# Patient Record
Sex: FEMALE | Race: BLACK OR AFRICAN AMERICAN | NOT HISPANIC OR LATINO | Employment: UNEMPLOYED | URBAN - METROPOLITAN AREA
[De-identification: names, ages, dates, MRNs, and addresses within clinical notes are randomized per-mention and may not be internally consistent; named-entity substitution may affect disease eponyms.]

---

## 2019-10-10 ENCOUNTER — OFFICE VISIT (OUTPATIENT)
Dept: FAMILY MEDICINE | Facility: CLINIC | Age: 59
End: 2019-10-10
Payer: COMMERCIAL

## 2019-10-10 ENCOUNTER — LAB VISIT (OUTPATIENT)
Dept: LAB | Facility: HOSPITAL | Age: 59
End: 2019-10-10
Attending: NURSE PRACTITIONER
Payer: COMMERCIAL

## 2019-10-10 ENCOUNTER — TELEPHONE (OUTPATIENT)
Dept: NEUROLOGY | Facility: CLINIC | Age: 59
End: 2019-10-10

## 2019-10-10 ENCOUNTER — TELEPHONE (OUTPATIENT)
Dept: FAMILY MEDICINE | Facility: CLINIC | Age: 59
End: 2019-10-10

## 2019-10-10 VITALS
DIASTOLIC BLOOD PRESSURE: 72 MMHG | BODY MASS INDEX: 29.84 KG/M2 | OXYGEN SATURATION: 98 % | HEART RATE: 86 BPM | SYSTOLIC BLOOD PRESSURE: 126 MMHG | HEIGHT: 64 IN | TEMPERATURE: 98 F | WEIGHT: 174.81 LBS

## 2019-10-10 DIAGNOSIS — E66.9 OBESITY (BMI 30.0-34.9): ICD-10-CM

## 2019-10-10 DIAGNOSIS — R29.6 FALLS FREQUENTLY: ICD-10-CM

## 2019-10-10 DIAGNOSIS — M62.81 MUSCLE WEAKNESS (GENERALIZED): Primary | ICD-10-CM

## 2019-10-10 DIAGNOSIS — M62.81 MUSCLE WEAKNESS (GENERALIZED): ICD-10-CM

## 2019-10-10 DIAGNOSIS — E11.65 CONTROLLED TYPE 2 DIABETES MELLITUS WITH HYPERGLYCEMIA, WITHOUT LONG-TERM CURRENT USE OF INSULIN: ICD-10-CM

## 2019-10-10 DIAGNOSIS — R10.9 ABDOMINAL DISCOMFORT: ICD-10-CM

## 2019-10-10 LAB
ALBUMIN SERPL BCP-MCNC: 3.8 G/DL (ref 3.5–5.2)
ALP SERPL-CCNC: 81 U/L (ref 55–135)
ALT SERPL W/O P-5'-P-CCNC: 13 U/L (ref 10–44)
ALT SERPL W/O P-5'-P-CCNC: 13 U/L (ref 10–44)
ANION GAP SERPL CALC-SCNC: 9 MMOL/L (ref 8–16)
AST SERPL-CCNC: 14 U/L (ref 10–40)
AST SERPL-CCNC: 14 U/L (ref 10–40)
BASOPHILS # BLD AUTO: 0.07 K/UL (ref 0–0.2)
BASOPHILS NFR BLD: 0.5 % (ref 0–1.9)
BILIRUB SERPL-MCNC: 0.2 MG/DL (ref 0.1–1)
BUN SERPL-MCNC: 18 MG/DL (ref 6–20)
CALCIUM SERPL-MCNC: 9.8 MG/DL (ref 8.7–10.5)
CHLORIDE SERPL-SCNC: 101 MMOL/L (ref 95–110)
CK SERPL-CCNC: 63 U/L (ref 20–180)
CO2 SERPL-SCNC: 28 MMOL/L (ref 23–29)
CREAT SERPL-MCNC: 1 MG/DL (ref 0.5–1.4)
CRP SERPL-MCNC: 10.8 MG/L (ref 0–8.2)
DIFFERENTIAL METHOD: ABNORMAL
EOSINOPHIL # BLD AUTO: 0.4 K/UL (ref 0–0.5)
EOSINOPHIL NFR BLD: 2.8 % (ref 0–8)
ERYTHROCYTE [DISTWIDTH] IN BLOOD BY AUTOMATED COUNT: 12.8 % (ref 11.5–14.5)
ERYTHROCYTE [SEDIMENTATION RATE] IN BLOOD BY WESTERGREN METHOD: 47 MM/HR (ref 0–20)
EST. GFR  (AFRICAN AMERICAN): >60 ML/MIN/1.73 M^2
EST. GFR  (NON AFRICAN AMERICAN): >60 ML/MIN/1.73 M^2
GLUCOSE SERPL-MCNC: 98 MG/DL (ref 70–110)
HCT VFR BLD AUTO: 34 % (ref 37–48.5)
HGB BLD-MCNC: 10.1 G/DL (ref 12–16)
IMM GRANULOCYTES # BLD AUTO: 0.05 K/UL (ref 0–0.04)
IMM GRANULOCYTES NFR BLD AUTO: 0.3 % (ref 0–0.5)
LYMPHOCYTES # BLD AUTO: 3.6 K/UL (ref 1–4.8)
LYMPHOCYTES NFR BLD: 24.4 % (ref 18–48)
MCH RBC QN AUTO: 27.6 PG (ref 27–31)
MCHC RBC AUTO-ENTMCNC: 29.7 G/DL (ref 32–36)
MCV RBC AUTO: 93 FL (ref 82–98)
MONOCYTES # BLD AUTO: 0.9 K/UL (ref 0.3–1)
MONOCYTES NFR BLD: 6.1 % (ref 4–15)
NEUTROPHILS # BLD AUTO: 9.8 K/UL (ref 1.8–7.7)
NEUTROPHILS NFR BLD: 65.9 % (ref 38–73)
NRBC BLD-RTO: 0 /100 WBC
PLATELET # BLD AUTO: 507 K/UL (ref 150–350)
PMV BLD AUTO: 10.7 FL (ref 9.2–12.9)
POTASSIUM SERPL-SCNC: 3.9 MMOL/L (ref 3.5–5.1)
PROT SERPL-MCNC: 7.5 G/DL (ref 6–8.4)
RBC # BLD AUTO: 3.66 M/UL (ref 4–5.4)
SODIUM SERPL-SCNC: 138 MMOL/L (ref 136–145)
WBC # BLD AUTO: 14.84 K/UL (ref 3.9–12.7)

## 2019-10-10 PROCEDURE — 3008F BODY MASS INDEX DOCD: CPT | Mod: CPTII,S$GLB,, | Performed by: NURSE PRACTITIONER

## 2019-10-10 PROCEDURE — 83615 LACTATE (LD) (LDH) ENZYME: CPT

## 2019-10-10 PROCEDURE — 85025 COMPLETE CBC W/AUTO DIFF WBC: CPT

## 2019-10-10 PROCEDURE — 99204 OFFICE O/P NEW MOD 45 MIN: CPT | Mod: S$GLB,,, | Performed by: NURSE PRACTITIONER

## 2019-10-10 PROCEDURE — 86140 C-REACTIVE PROTEIN: CPT

## 2019-10-10 PROCEDURE — 36415 COLL VENOUS BLD VENIPUNCTURE: CPT | Mod: PO

## 2019-10-10 PROCEDURE — 99999 PR PBB SHADOW E&M-NEW PATIENT-LVL III: CPT | Mod: PBBFAC,,, | Performed by: NURSE PRACTITIONER

## 2019-10-10 PROCEDURE — 82550 ASSAY OF CK (CPK): CPT

## 2019-10-10 PROCEDURE — 99999 PR PBB SHADOW E&M-NEW PATIENT-LVL III: ICD-10-PCS | Mod: PBBFAC,,, | Performed by: NURSE PRACTITIONER

## 2019-10-10 PROCEDURE — 3008F PR BODY MASS INDEX (BMI) DOCUMENTED: ICD-10-PCS | Mod: CPTII,S$GLB,, | Performed by: NURSE PRACTITIONER

## 2019-10-10 PROCEDURE — 82085 ASSAY OF ALDOLASE: CPT

## 2019-10-10 PROCEDURE — 80053 COMPREHEN METABOLIC PANEL: CPT

## 2019-10-10 PROCEDURE — 85651 RBC SED RATE NONAUTOMATED: CPT | Mod: PO

## 2019-10-10 PROCEDURE — 99204 PR OFFICE/OUTPT VISIT, NEW, LEVL IV, 45-59 MIN: ICD-10-PCS | Mod: S$GLB,,, | Performed by: NURSE PRACTITIONER

## 2019-10-10 RX ORDER — GLIMEPIRIDE 4 MG/1
4 TABLET ORAL
COMMUNITY
Start: 2019-09-09 | End: 2020-01-14 | Stop reason: SDUPTHER

## 2019-10-10 RX ORDER — ATORVASTATIN CALCIUM 10 MG/1
10 TABLET, FILM COATED ORAL DAILY
COMMUNITY
End: 2021-10-12 | Stop reason: SDUPTHER

## 2019-10-10 RX ORDER — METFORMIN HYDROCHLORIDE 1000 MG/1
1000 TABLET ORAL 2 TIMES DAILY WITH MEALS
COMMUNITY
Start: 2019-09-09 | End: 2020-03-20 | Stop reason: SDUPTHER

## 2019-10-10 RX ORDER — LOSARTAN POTASSIUM 50 MG/1
50 TABLET ORAL DAILY
COMMUNITY
Start: 2019-09-10 | End: 2019-11-07

## 2019-10-10 RX ORDER — LEVOTHYROXINE SODIUM 50 UG/1
50 TABLET ORAL DAILY
COMMUNITY
End: 2020-01-14 | Stop reason: SDUPTHER

## 2019-10-10 RX ORDER — AMLODIPINE BESYLATE 5 MG/1
10 TABLET ORAL
COMMUNITY
Start: 2019-09-25 | End: 2020-01-14 | Stop reason: ALTCHOICE

## 2019-10-10 NOTE — PROGRESS NOTES
Subjective:       Patient ID: Lala Saxena is a 59 y.o. female.    Chief Complaint: gait difficulty (leg weakness)    Patient presents today with family for concerns of progressive weakness over 3 months. Patient reports muscle weakness started with lower extremity and progressed to upper extremities. Patient recently started on Lipitor and metformin in august. Patient family reports patient has fallen 3-4 times in one month.  reports having to help patient with ADLs .Patient is now using a walker to ambulate. Denies respiratory distress     Fatigue   This is a new problem. The current episode started more than 1 month ago. The problem occurs constantly. Associated symptoms include abdominal pain, fatigue and weakness. Pertinent negatives include no chest pain, congestion, coughing, fever, headaches, nausea, numbness, rash, sore throat or swollen glands. The symptoms are aggravated by exertion. She has tried nothing for the symptoms.       Past Medical History:   Diagnosis Date    Diabetes mellitus, type 2     Hyperlipidemia     Hypertension     Thyroid disease        Review of patient's allergies indicates:  No Known Allergies      Current Outpatient Medications:     amLODIPine (NORVASC) 5 MG tablet, Take 5 mg by mouth., Disp: , Rfl:     atorvastatin (LIPITOR) 10 MG tablet, Take 10 mg by mouth once daily., Disp: , Rfl:     glimepiride (AMARYL) 4 MG tablet, Take 4 mg by mouth daily with breakfast. , Disp: , Rfl:     levothyroxine (SYNTHROID) 50 MCG tablet, Take 50 mcg by mouth once daily., Disp: , Rfl:     losartan (COZAAR) 50 MG tablet, Take 50 mg by mouth once daily. , Disp: , Rfl:     metFORMIN (GLUCOPHAGE) 1000 MG tablet, Take 1,000 mg by mouth 2 (two) times daily with meals. , Disp: , Rfl:     Review of Systems   Constitutional: Positive for fatigue. Negative for fever and unexpected weight change.   HENT: Negative for congestion, sore throat and trouble swallowing.    Eyes: Negative for  "visual disturbance.   Respiratory: Negative for cough and shortness of breath.    Cardiovascular: Negative for chest pain, palpitations and leg swelling.   Gastrointestinal: Positive for abdominal pain. Negative for blood in stool and nausea.   Genitourinary: Negative for hematuria.   Skin: Negative for rash.   Allergic/Immunologic: Negative for immunocompromised state.   Neurological: Positive for weakness. Negative for numbness and headaches.   Hematological: Does not bruise/bleed easily.   Psychiatric/Behavioral: Negative for agitation. The patient is not nervous/anxious.        Objective:      /72 (BP Location: Right arm, Patient Position: Sitting, BP Method: Large (Manual))   Pulse 86   Temp 98 °F (36.7 °C) (Oral)   Ht 5' 4" (1.626 m)   Wt 79.3 kg (174 lb 13.2 oz)   SpO2 98%   BMI 30.01 kg/m²   Physical Exam   Constitutional: She is oriented to person, place, and time. She appears well-developed and well-nourished.   Eyes: Pupils are equal, round, and reactive to light. EOM are normal.   Neck: Normal range of motion.   Cardiovascular: Normal rate, regular rhythm and normal heart sounds.   Pulmonary/Chest: Effort normal and breath sounds normal.   Abdominal: Soft. Bowel sounds are normal.   Musculoskeletal: Normal range of motion.   No noticeable contraction, decrease bilateral hand , muscle weakness in the lower and upper extremity.  muscle weakness more pronounce in lower extremities.   Neurological: She is alert and oriented to person, place, and time. She displays a negative Romberg sign. Coordination and gait abnormal.   Skin: Skin is warm and dry.   Psychiatric: She has a normal mood and affect. Her behavior is normal. Judgment and thought content normal.       Assessment:       1. Muscle weakness (generalized)    2. Falls frequently    3. Controlled type 2 diabetes mellitus with hyperglycemia, without long-term current use of insulin    4. Obesity (BMI 30.0-34.9)        Plan:       Muscle " weakness (generalized)  -     CK; Future; Expected date: 10/10/2019  -     Comprehensive metabolic panel; Future; Expected date: 10/10/2019  -     CBC auto differential; Future; Expected date: 10/10/2019  -     Sedimentation rate; Future; Expected date: 10/10/2019  -     C-reactive protein; Future; Expected date: 10/10/2019  -     Ambulatory referral to Neurology  -     Aldolase; Future; Expected date: 10/10/2019  -     Lactate dehydrogenase; Future; Expected date: 10/10/2019  -     ALT (SGPT); Future; Expected date: 10/10/2019  -     AST (SGOT); Future; Expected date: 10/10/2019  -     Myoglobin, urine; Future; Expected date: 10/10/2019  Patient reports muscle weakness started when she started Lipitor  Hold Lipitor until lab results are reviewed  Falls frequently  -     CK; Future; Expected date: 10/10/2019  -     Comprehensive metabolic panel; Future; Expected date: 10/10/2019  -     CBC auto differential; Future; Expected date: 10/10/2019  -     Sedimentation rate; Future; Expected date: 10/10/2019  -     C-reactive protein; Future; Expected date: 10/10/2019  -     Ambulatory referral to Neurology  -     Aldolase; Future; Expected date: 10/10/2019  -     Lactate dehydrogenase; Future; Expected date: 10/10/2019  -     ALT (SGPT); Future; Expected date: 10/10/2019  -     AST (SGOT); Future; Expected date: 10/10/2019  -     Myoglobin, urine; Future; Expected date: 10/10/2019  Abdominal discomfort  Patient reports abdominal discomfort since Morenita=tformin was increased  Hold metformin for three days, I will call to follow up with patient  Controlled type 2 diabetes mellitus with hyperglycemia, without long-term current use of insulin  -     Hemoglobin A1c; Future; Expected date: 10/10/2019  Obesity (BMI 30.0-34.9)  Counseled patient on his ideal body weight, health consequences of being obese and current recommendations including weekly exercise and a heart healthy diet.  Current BMI is:Estimated body mass index is  "30.01 kg/m² as calculated from the following:    Height as of this encounter: 5' 4" (1.626 m).    Weight as of this encounter: 79.3 kg (174 lb 13.2 oz)..  Patient is aware that ideal BMI < 25 or Weight in (lb) to have BMI = 25: 145.3.            Patient readiness: acceptance and barriers:none    During the course of the visit the patient was educated and counseled about the following:     Diabetes:  Discussed general issues about diabetes pathophysiology and management.  Suggested low cholesterol diet.  Hypertension:   Dietary sodium restriction.  Obesity:   General weight loss/lifestyle modification strategies discussed (elicit support from others; identify saboteurs; non-food rewards, etc).    Goals: Diabetes: Maintain Hemoglobin A1C below 7, Hypertension: Reduce Blood Pressure and Obesity: Reduce calorie intake and BMI    Did patient meet goals/outcomes: No    The following self management tools provided: declined    Patient Instructions (the written plan) was given to the patient/family.     Time spent with patient: 30 minutes    Barriers to medications present (no )    Adverse reactions to current medications (no)    Over the counter medications reviewed (Yes)        "

## 2019-10-10 NOTE — TELEPHONE ENCOUNTER
Please contact pt for an appt  Muscle weakness lower ext  Pt is aware that you will be calling   appt is needed ASAP

## 2019-10-11 LAB — LDH SERPL L TO P-CCNC: 179 U/L (ref 110–260)

## 2019-10-14 DIAGNOSIS — R82.998 URINE WBC INCREASED: Primary | ICD-10-CM

## 2019-10-14 DIAGNOSIS — R10.84 GENERALIZED ABDOMINAL PAIN: ICD-10-CM

## 2019-10-14 DIAGNOSIS — D75.839 THROMBOCYTOSIS: ICD-10-CM

## 2019-10-14 LAB — ALDOLASE SERPL-CCNC: 2.2 U/L (ref 1.2–7.6)

## 2019-10-15 ENCOUNTER — TELEPHONE (OUTPATIENT)
Dept: FAMILY MEDICINE | Facility: CLINIC | Age: 59
End: 2019-10-15

## 2019-10-15 NOTE — TELEPHONE ENCOUNTER
----- Message from Soniarajinder Moshe sent at 10/15/2019  2:43 PM CDT -----  Contact: patient   Type:  Patient Returning Call    Who Called: patient   Who Left Message for Patient:  n/a  Does the patient know what this is regarding?:  Results   Best Call Back Number:  [phone]

## 2019-10-18 ENCOUNTER — HOSPITAL ENCOUNTER (OUTPATIENT)
Dept: RADIOLOGY | Facility: HOSPITAL | Age: 59
Discharge: HOME | End: 2019-10-18
Attending: NURSE PRACTITIONER
Payer: COMMERCIAL

## 2019-10-18 DIAGNOSIS — R10.84 GENERALIZED ABDOMINAL PAIN: ICD-10-CM

## 2019-10-18 PROCEDURE — 74178 CT ABD&PLV WO CNTR FLWD CNTR: CPT | Mod: TC

## 2019-10-18 PROCEDURE — 25500020 PHARM REV CODE 255: Performed by: NURSE PRACTITIONER

## 2019-10-18 RX ADMIN — IOHEXOL 100 ML: 350 INJECTION, SOLUTION INTRAVENOUS at 10:10

## 2019-10-23 ENCOUNTER — TELEPHONE (OUTPATIENT)
Dept: FAMILY MEDICINE | Facility: CLINIC | Age: 59
End: 2019-10-23

## 2019-10-23 NOTE — TELEPHONE ENCOUNTER
Patient requesting copy of recent lab results. Requested result be mailed to her home address. Lab results mailed as requested. Patient also requesting assistance scheduling Neurology referral. Message forwarded to Dr. Oro's staff to assist with scheduling. Patient notified.

## 2019-10-24 ENCOUNTER — TELEPHONE (OUTPATIENT)
Dept: FAMILY MEDICINE | Facility: CLINIC | Age: 59
End: 2019-10-24

## 2019-10-24 NOTE — TELEPHONE ENCOUNTER
Neurology referral placed by Mrs. Allen Crocker NP related to muscle weakness (generalized) and frequent falls. Requesting assistance scheduling appointment for patient. Patient can be reached at [phone]. Thank you.

## 2019-11-04 ENCOUNTER — TELEPHONE (OUTPATIENT)
Dept: FAMILY MEDICINE | Facility: CLINIC | Age: 59
End: 2019-11-04

## 2019-11-04 NOTE — TELEPHONE ENCOUNTER
----- Message from Yelena Garner sent at 11/4/2019  8:39 AM CST -----  Contact: Patient  Type: Needs Medical Advice    Who Called:  patient  Symptoms (please be specific):  na  How long has patient had these symptoms:  matilda  Pharmacy name and phone #:  matilda  Best Call Back Number: [phone]  Additional Information: Patient states Obi's office was suppose to call her back and set up apt. With neurologist but no one has returned her call.  Please call to advise and schedule. Thanks!

## 2019-11-05 ENCOUNTER — OFFICE VISIT (OUTPATIENT)
Dept: NEUROLOGY | Facility: CLINIC | Age: 59
End: 2019-11-05
Payer: COMMERCIAL

## 2019-11-05 ENCOUNTER — HOSPITAL ENCOUNTER (EMERGENCY)
Facility: HOSPITAL | Age: 59
Discharge: HOME | End: 2019-11-05
Attending: EMERGENCY MEDICINE
Payer: COMMERCIAL

## 2019-11-05 VITALS
BODY MASS INDEX: 29.71 KG/M2 | HEART RATE: 83 BPM | HEIGHT: 64 IN | OXYGEN SATURATION: 100 % | WEIGHT: 174 LBS | DIASTOLIC BLOOD PRESSURE: 68 MMHG | SYSTOLIC BLOOD PRESSURE: 143 MMHG | RESPIRATION RATE: 18 BRPM | TEMPERATURE: 98 F

## 2019-11-05 VITALS
BODY MASS INDEX: 30.01 KG/M2 | HEART RATE: 86 BPM | HEIGHT: 64 IN | SYSTOLIC BLOOD PRESSURE: 189 MMHG | DIASTOLIC BLOOD PRESSURE: 85 MMHG

## 2019-11-05 DIAGNOSIS — R27.0 ATAXIA: Primary | ICD-10-CM

## 2019-11-05 DIAGNOSIS — I10 HYPERTENSION, UNSPECIFIED TYPE: Primary | ICD-10-CM

## 2019-11-05 DIAGNOSIS — Z87.39 HISTORY OF WEAKNESS OF EXTREMITY: ICD-10-CM

## 2019-11-05 DIAGNOSIS — R53.1 WEAKNESS: ICD-10-CM

## 2019-11-05 DIAGNOSIS — D72.829 LEUKOCYTOSIS, UNSPECIFIED TYPE: ICD-10-CM

## 2019-11-05 DIAGNOSIS — R29.898 LEFT HAND WEAKNESS: ICD-10-CM

## 2019-11-05 PROCEDURE — 99999 PR PBB SHADOW E&M-EST. PATIENT-LVL IV: ICD-10-PCS | Mod: PBBFAC,,, | Performed by: PSYCHIATRY & NEUROLOGY

## 2019-11-05 PROCEDURE — 3008F PR BODY MASS INDEX (BMI) DOCUMENTED: ICD-10-PCS | Mod: CPTII,S$GLB,, | Performed by: PSYCHIATRY & NEUROLOGY

## 2019-11-05 PROCEDURE — 3008F BODY MASS INDEX DOCD: CPT | Mod: CPTII,S$GLB,, | Performed by: PSYCHIATRY & NEUROLOGY

## 2019-11-05 PROCEDURE — 99205 PR OFFICE/OUTPT VISIT, NEW, LEVL V, 60-74 MIN: ICD-10-PCS | Mod: S$GLB,,, | Performed by: PSYCHIATRY & NEUROLOGY

## 2019-11-05 PROCEDURE — 99999 PR PBB SHADOW E&M-EST. PATIENT-LVL IV: CPT | Mod: PBBFAC,,, | Performed by: PSYCHIATRY & NEUROLOGY

## 2019-11-05 PROCEDURE — 99205 OFFICE O/P NEW HI 60 MIN: CPT | Mod: S$GLB,,, | Performed by: PSYCHIATRY & NEUROLOGY

## 2019-11-05 PROCEDURE — 99282 PR EMERGENCY DEPT VISIT,LEVEL II: ICD-10-PCS | Mod: ,,, | Performed by: PHYSICIAN ASSISTANT

## 2019-11-05 PROCEDURE — 99281 EMR DPT VST MAYX REQ PHY/QHP: CPT

## 2019-11-05 PROCEDURE — 99282 EMERGENCY DEPT VISIT SF MDM: CPT | Mod: ,,, | Performed by: PHYSICIAN ASSISTANT

## 2019-11-05 RX ORDER — LOSARTAN POTASSIUM 25 MG/1
25 TABLET ORAL ONCE
Status: DISCONTINUED | OUTPATIENT
Start: 2019-11-05 | End: 2019-11-05 | Stop reason: HOSPADM

## 2019-11-05 RX ORDER — LOSARTAN POTASSIUM 25 MG/1
TABLET ORAL
COMMUNITY
Start: 2019-10-15 | End: 2019-11-07

## 2019-11-05 NOTE — ASSESSMENT & PLAN NOTE
60yo with subacute staggering gait. She has been recently dxed with DM2. Exam shows no cerebellar findings, however she has a stiff, staggering gait. Increased reflexes on L side, POS hoffmans and clawed appearence of L hand - concern for cspine dz. Brain MRI due to additional rigidity proximally. Paraneoplastic panel, with minnie 65 given axial and appedicular stiffness. PT.

## 2019-11-05 NOTE — LETTER
November 5, 2019      Allen Crocker, SHAHANA  [address]  [facility] [address]           [facility]  [address]  [facility] [address]  Phone: [phone]  Fax: [phone]          Patient: Lala Saxena   MR Number: [id number]   YOB: 1960   Date of Visit: 11/5/2019       Dear Allen Crocker:    Thank you for referring Lala Saxena to me for evaluation. Attached you will find relevant portions of my assessment and plan of care.    If you have questions, please do not hesitate to call me. I look forward to following Lala Saxena along with you.    Sincerely,    Katheryn Rao MD    Enclosure  CC:  No Recipients    If you would like to receive this communication electronically, please contact [email or web address] or [phone] to request more information on [employer] access.    For providers and/or their staff who would like to refer a patient to [facility], please contact us through our one-stop-shop provider referral line, [facility], at [phone].    If you feel you have received this communication in error or would no longer like to receive these types of communications, please e-mail [email or web address]          No. MYRANDA screening performed.  STOP BANG Legend: 0-2 = LOW Risk; 3-4 = INTERMEDIATE Risk; 5-8 = HIGH Risk
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
## 2019-11-06 NOTE — ED NOTES
Discharge home with family, states understanding to follow up as directed. Ambulates out of ED without difficulty. States understanding of future appointments, deneis concerns upon discharge.

## 2019-11-06 NOTE — DISCHARGE INSTRUCTIONS
Future Appointments   Date Time Provider Department Center   11/7/2019  9:00 AM Edward Hernandez MD [facility] IM [address] [facility]   11/8/2019  4:45 PM [facility]I-MRI2 [facility]   11/8/2019  5:15 PM [facility]   12/23/2019  7:20 AM Janette Church MD [facility]       Our goal in the emergency department is to always give you outstanding care and exceptional service. You may receive a survey by mail or e-mail in the next week regarding your experience in our ED. We would greatly appreciate your completing and returning the survey. Your feedback provides us with a way to recognize our staff who give very good care and it helps us learn how to improve when your experience was below our aspiration of excellence.

## 2019-11-06 NOTE — ED NOTES
"Patient escorted from bathroom to RWR, states she does not want a CT scan, she has "had them before"  States she needs some time alone and would like to go outside with her spouse. Family member taking patient in front of hospital. Update to PA.   "

## 2019-11-06 NOTE — ED TRIAGE NOTES
"Patient states her blood pressure was high at Neurology office, 180s. States he tool a "piece" of her blood pressure med, only on Coozar 50 mg,  this at 1200. Denies any symptoms,deies CP, SOB, blurred vision, headache,  "

## 2019-11-06 NOTE — ED NOTES
Patient identifiers verified and correct for Ms Saxena  C/C: Elevated blood pressure SEE NN  APPEARANCE: awake and alert in NAD.  SKIN: warm, dry and intact. No breakdown or bruising.  MUSCULOSKELETAL: Patient moving all extremities spontaneously, no obvious swelling or deformities noted. Ambulates with cane  RESPIRATORY: Denies shortness of breath.Respirations unlabored.   CARDIAC: Denies CP, 2+ distal pulses; no peripheral edema  ABDOMEN: S/ND/NT, Denies nausea  : voids spontaneously, denies difficulty  Neurologic: AAO x 4; follows commands equal strength in all extremities; denies numbness/tingling. Denies dizziness denies weakness, denies headache

## 2019-11-06 NOTE — ED PROVIDER NOTES
Encounter Date: 11/5/2019       History     Chief Complaint   Patient presents with    Hypertension     sent from neurology clinic bp high , feel fine     7:23 PM  Patient is a 59-year-old female who presents the ED after being referred by Neurology for elevated blood pressure.  States that she only took half a tablet of her losartan today because she states that she does not feel right after taking a whole tablet.  She has a hard time telling me how long she has been having symptoms after taking an entire dose of losartan.  Patient saw Neurology today for weakness for approximately 1.5 month.  She states that she has noted it mainly in her left upper extremities but does have difficulty walking and uses a cane now. Her symptoms are not worse than usual. Denies any headache, dizziness, lightheadedness, slurred speech, chest pain, coughing, shortness of breath, abdominal pain, nausea, vomiting, urinary symptoms.  denies that patient has any slurred speech or feels as if she has a hard time finding words.        Review of patient's allergies indicates:  No Known Allergies  Past Medical History:   Diagnosis Date    Diabetes mellitus, type 2     Hyperlipidemia     Hypertension     Thyroid disease      History reviewed. No pertinent surgical history.  History reviewed. No pertinent family history.  Social History     Tobacco Use    Smoking status: Never Smoker    Smokeless tobacco: Never Used   Substance Use Topics    Alcohol use: Not Currently    Drug use: Not on file     Review of Systems   Constitutional: Negative for chills and fever.   HENT: Negative for sore throat.    Respiratory: Negative for shortness of breath.    Cardiovascular: Negative for chest pain.   Gastrointestinal: Negative for nausea.   Genitourinary: Negative for dysuria.   Musculoskeletal: Positive for gait problem. Negative for back pain.   Skin: Negative for rash.   Neurological: Positive for weakness. Negative for headaches.    Hematological: Does not bruise/bleed easily.       Physical Exam     Initial Vitals [11/05/19 1636]   BP Pulse Resp Temp SpO2   (!) 143/68 83 18 98.2 °F (36.8 °C) 100 %      MAP       --         Physical Exam    Vitals reviewed.  Constitutional: She appears well-developed and well-nourished. She is not diaphoretic. No distress.   HENT:   Head: Normocephalic and atraumatic.   Nose: Nose normal.   Eyes: Conjunctivae and EOM are normal.   Neck: Normal range of motion.   Pulmonary/Chest: No respiratory distress.   Abdominal: Soft. She exhibits no distension. There is no tenderness. There is no rebound.   Musculoskeletal: Normal range of motion.   Neurological: She is alert and oriented to person, place, and time. She has normal strength. No sensory deficit.   No facial asymmetry.  Weakness noted in left upper extremity.  She has a deformity noted of her left hand.  Difficulty with finger-to-nose mainly on left due to weakness.  Lower extremities within normal limits.     Skin: Skin is warm and dry. No erythema. No pallor.   Psychiatric: She has a normal mood and affect. Thought content normal.         ED Course   Procedures  Labs Reviewed - No data to display       Imaging Results    None          Medical Decision Making:   History:   Old Medical Records: I decided to obtain old medical records.  Old Records Summarized: records from clinic visits and records from previous admission(s).  Initial Assessment:   Patient is a 59-year-old female who presents the ED after being referred by Neurology for elevated blood pressure. Saw neurology today for weakness for 1.5 months.  Differential Diagnosis:   Includes but is not limited to white coat hypertension, hypertensive urgency, hypertensive emergency such as stroke, brain lesion, neuromuscular disorder.  Clinical Tests:   Radiological Study: Ordered  ED Management:  BP from 200/98 to 143/68 mmHg after resting in ED waiting room.    Repeat BP to 215/89 mmHg during my  interview, most likely white coat hypertension. She only took half her losartan today.     Appreciable weakness in left upper extremity during my exam.  However, her symptoms have been going on for over a month without worsening or symptoms neuro symptoms. Given exam and history of elevated BP, will obtain CT head to rule out any acute processes and lesions, however Neurology today has ordered outpatient labs and MRIs for further investigation.    Patient updated with plan. She declines CT and have of her losartan dose.  States that she has had 3 CTs in the past month which were all unremarkable.  Although we wanted to order CT head in the ED, it is unlikely that she has had an acute stroke given her symptoms for over 1 month.  I find it appropriate to continue outpatient workup.  Patient has her MRI scheduled in the next 3 days.  She was advised to return to her normal dose of HTN medication.  Patient comfortable with plan and stable for discharge.    I have reviewed patient's chart and discussed this case with my supervising MD.     Anusha Llanos PA-C  Emergent Department  [facility]  Spectralink #[id number] or #[phone]      Future Appointments   Date Time Provider Department Center   11/7/2019  9:00 AM Edward Hernandez MD [facility] [address] [facility]   11/8/2019  4:45 PM [facility]   11/8/2019  5:15 PM [facility]   12/23/2019  7:20 AM Janette Church MD [facility]                                    Clinical Impression:       ICD-10-CM ICD-9-CM   1. Hypertension, unspecified type I10 401.9   2. History of weakness of extremity Z87.39 V13.59         Disposition:   Disposition: Discharged  Condition: Stable                     Anusha Llanos PA-C  11/05/19 1952

## 2019-11-07 ENCOUNTER — OFFICE VISIT (OUTPATIENT)
Dept: INTERNAL MEDICINE | Facility: CLINIC | Age: 59
End: 2019-11-07
Payer: COMMERCIAL

## 2019-11-07 VITALS
WEIGHT: 172.81 LBS | SYSTOLIC BLOOD PRESSURE: 140 MMHG | DIASTOLIC BLOOD PRESSURE: 86 MMHG | BODY MASS INDEX: 29.5 KG/M2 | HEART RATE: 85 BPM | HEIGHT: 64 IN

## 2019-11-07 DIAGNOSIS — I10 ESSENTIAL HYPERTENSION: ICD-10-CM

## 2019-11-07 DIAGNOSIS — R29.898 LEFT HAND WEAKNESS: ICD-10-CM

## 2019-11-07 DIAGNOSIS — Z76.89 ENCOUNTER TO ESTABLISH CARE WITH NEW DOCTOR: Primary | ICD-10-CM

## 2019-11-07 DIAGNOSIS — R27.0 ATAXIA: ICD-10-CM

## 2019-11-07 DIAGNOSIS — R35.0 FREQUENCY OF URINATION: ICD-10-CM

## 2019-11-07 DIAGNOSIS — E11.40 TYPE 2 DIABETES MELLITUS WITH DIABETIC NEUROPATHY, WITHOUT LONG-TERM CURRENT USE OF INSULIN: ICD-10-CM

## 2019-11-07 PROBLEM — E11.9 TYPE 2 DIABETES MELLITUS, WITHOUT LONG-TERM CURRENT USE OF INSULIN: Status: ACTIVE | Noted: 2019-11-07

## 2019-11-07 PROCEDURE — 99396 PREV VISIT EST AGE 40-64: CPT | Mod: S$GLB,,, | Performed by: FAMILY MEDICINE

## 2019-11-07 PROCEDURE — 99999 PR PBB SHADOW E&M-EST. PATIENT-LVL III: ICD-10-PCS | Mod: PBBFAC,,, | Performed by: FAMILY MEDICINE

## 2019-11-07 PROCEDURE — 99396 PR PREVENTIVE VISIT,EST,40-64: ICD-10-PCS | Mod: S$GLB,,, | Performed by: FAMILY MEDICINE

## 2019-11-07 PROCEDURE — 99999 PR PBB SHADOW E&M-EST. PATIENT-LVL III: CPT | Mod: PBBFAC,,, | Performed by: FAMILY MEDICINE

## 2019-11-07 RX ORDER — LOSARTAN POTASSIUM 25 MG/1
30 TABLET ORAL DAILY
Status: ON HOLD | COMMUNITY
End: 2019-12-03 | Stop reason: HOSPADM

## 2019-11-07 NOTE — PROGRESS NOTES
Subjective:       Patient ID: Lala Saxena is a 59 y.o. female.    Chief Complaint: Establish Care (legs arms and back pain, swelling feet)    HPI    Patient presents with family members today      59yoF with PMHx HTN, hypothyroid, DM to est care. Sent to ED from Neurology appt for elevated bp. Refused imaging in ED. Former PCP in [place] who started most of meds was Dr. Bernadette Juarez     #Neurology -- LOV 11/5/19 Dr. Rao  - est s/t weakness, ataxia, freq falls x 1-2 months without acute worsening.  - MRI ordered and labwork. flavia be getting MRI on 11/8 PM    #DM - dx earlier this year by MD in [place] where pt was living  - does endorse constipation, miralax helps but hasnt used in a few days and no BM since last use. Also endorses back pain and freq urination. Denies incontinence.  - current reg: Metformin 1000mg bid, Sulfonylurea, Lipitor  - pt has not been taking lipitor in last month s/t concerns related to myopathy but no change since stopping, CK normal    #HTN - new diagosis earlier this year by MD in [place]    Review of Systems   Constitutional: Positive for fatigue. Negative for appetite change and fever.   HENT: Negative for ear pain, sinus pain and sore throat.    Respiratory: Negative for cough and shortness of breath.    Cardiovascular: Positive for leg swelling. Negative for chest pain and palpitations.   Gastrointestinal: Negative for abdominal pain, constipation, diarrhea, nausea and vomiting.   Genitourinary: Negative for dysuria.   Musculoskeletal: Negative for back pain.   Skin: Negative for rash.   Neurological: Positive for weakness. Negative for dizziness, syncope, numbness and headaches.         Past Medical History:   Diagnosis Date    Diabetes mellitus, type 2     Hyperlipidemia     Hypertension     Thyroid disease         Prior to Admission medications    Medication Sig Start Date End Date Taking? Authorizing Provider   amLODIPine (NORVASC) 5 MG tablet Take 5 mg by mouth. 9/25/19  "9/24/20  Historical Provider, MD   atorvastatin (LIPITOR) 10 MG tablet Take 10 mg by mouth once daily.    Historical Provider, MD   glimepiride (AMARYL) 4 MG tablet Take 4 mg by mouth daily with breakfast.  9/9/19   Historical Provider, MD   levothyroxine (SYNTHROID) 50 MCG tablet Take 50 mcg by mouth once daily.    Historical Provider, MD   losartan (COZAAR) 25 MG tablet  10/15/19   Historical Provider, MD   losartan (COZAAR) 50 MG tablet Take 50 mg by mouth once daily.  9/10/19   Historical Provider, MD   metFORMIN (GLUCOPHAGE) 1000 MG tablet Take 1,000 mg by mouth 2 (two) times daily with meals.  9/9/19   Historical Provider, MD        Past medical history, surgical history, and family medical history reviewed and updated as appropriate.    Medications and allergies reviewed.     Objective:          Vitals:    11/07/19 0922   BP: (!) 140/86   BP Location: Right arm   Patient Position: Sitting   BP Method: Medium (Manual)   Pulse: 85   Weight: 78.4 kg (172 lb 13.5 oz)   Height: 5' 4" (1.626 m)     Body mass index is 29.67 kg/m².  Physical Exam   Constitutional: She is oriented to person, place, and time. She appears well-developed and well-nourished.   Eyes: Pupils are equal, round, and reactive to light. EOM are normal.   Cardiovascular: Normal rate, regular rhythm, normal heart sounds and intact distal pulses.   No murmur heard.  Pulmonary/Chest: Effort normal and breath sounds normal. No respiratory distress. She has no wheezes.   Neurological: She is alert and oriented to person, place, and time. She has normal strength. No cranial nerve deficit or sensory deficit. She exhibits abnormal muscle tone. Gait abnormal.   Reflex Scores:       Patellar reflexes are 2+ on the right side and 3+ on the left side.  Vitals reviewed.      Lab Results   Component Value Date    WBC 14.84 (H) 10/10/2019    HGB 10.1 (L) 10/10/2019    HCT 34.0 (L) 10/10/2019     (H) 10/10/2019    ALT 13 10/10/2019    ALT 13 10/10/2019    " AST 14 10/10/2019    AST 14 10/10/2019     10/10/2019    K 3.9 10/10/2019     10/10/2019    CREATININE 1.0 10/10/2019    BUN 18 10/10/2019    CO2 28 10/10/2019       Assessment:       1. Encounter to establish care with new doctor    2. Frequency of urination    3. Type 2 diabetes mellitus with diabetic neuropathy, without long-term current use of insulin    4. Essential hypertension    5. Left hand weakness    6. Ataxia        Plan:   Lala was seen today for establish care.    Diagnoses and all orders for this visit:    Establishing today. Patient and family originally thinking I was specialist for weakness, discussed today that will continue follow up with Neurologist and most importantly will be obtaining MRI tomorrow and future labwork ordered by their office. Discussed meds today and will manage bp and dm and other complications. UA today given complaints and pt concerned about infxn. Also miralax prn constipation, considering neuropathy as cause. Requesting records from [facility] pcp who made all these origin diagnoses. Continue to hold off statin, pt and family overall unclear of time course, meds that she is taking, doctors they are seeing. Need a lot of education and time. Tried to go over next steps with family and make them aware of importance of imaging study to rule in /rule out issues related to acute onset weakness and neuro changes.     Encounter to establish care with new doctor    Frequency of urination  -     Urinalysis; Future    Type 2 diabetes mellitus with diabetic neuropathy, without long-term current use of insulin    Essential hypertension    Left hand weakness    Ataxia      Follow up in about 4 weeks (around 12/5/2019).    Edward Hernandez MD  Family Medicine  [facility]  11/7/2019

## 2019-11-08 ENCOUNTER — HOSPITAL ENCOUNTER (OUTPATIENT)
Dept: RADIOLOGY | Facility: HOSPITAL | Age: 59
Discharge: HOME | End: 2019-11-08
Attending: PSYCHIATRY & NEUROLOGY
Payer: COMMERCIAL

## 2019-11-08 DIAGNOSIS — R53.1 WEAKNESS: ICD-10-CM

## 2019-11-08 PROCEDURE — 70553 MRI BRAIN STEM W/O & W/DYE: CPT | Mod: 26,,, | Performed by: RADIOLOGY

## 2019-11-08 PROCEDURE — 70553 MRI BRAIN STEM W/O & W/DYE: CPT | Mod: TC

## 2019-11-08 PROCEDURE — 72156 MRI NECK SPINE W/O & W/DYE: CPT | Mod: TC

## 2019-11-08 PROCEDURE — 70553 MRI BRAIN DEMYELINATING W/ WO CONTRAST: ICD-10-PCS | Mod: 26,,, | Performed by: RADIOLOGY

## 2019-11-08 PROCEDURE — 25500020 PHARM REV CODE 255: Performed by: PSYCHIATRY & NEUROLOGY

## 2019-11-08 PROCEDURE — 72156 MRI NECK SPINE W/O & W/DYE: CPT | Mod: 26,,, | Performed by: RADIOLOGY

## 2019-11-08 PROCEDURE — 72156 MRI CERVICAL SPINE DEMYELINATING W W/O CONTRAST: ICD-10-PCS | Mod: 26,,, | Performed by: RADIOLOGY

## 2019-11-08 PROCEDURE — A9585 GADOBUTROL INJECTION: HCPCS | Performed by: PSYCHIATRY & NEUROLOGY

## 2019-11-08 RX ORDER — GADOBUTROL 604.72 MG/ML
8 INJECTION INTRAVENOUS
Status: COMPLETED | OUTPATIENT
Start: 2019-11-08 | End: 2019-11-08

## 2019-11-08 RX ADMIN — GADOBUTROL 8 ML: 604.72 INJECTION INTRAVENOUS at 05:11

## 2019-11-11 ENCOUNTER — DOCUMENTATION ONLY (OUTPATIENT)
Dept: NEUROLOGY | Facility: CLINIC | Age: 59
End: 2019-11-11

## 2019-11-11 ENCOUNTER — HOSPITAL ENCOUNTER (EMERGENCY)
Facility: HOSPITAL | Age: 59
Discharge: HOME | End: 2019-11-11
Attending: EMERGENCY MEDICINE
Payer: COMMERCIAL

## 2019-11-11 VITALS
HEART RATE: 88 BPM | RESPIRATION RATE: 16 BRPM | OXYGEN SATURATION: 99 % | HEIGHT: 64 IN | SYSTOLIC BLOOD PRESSURE: 220 MMHG | TEMPERATURE: 98 F | WEIGHT: 179 LBS | DIASTOLIC BLOOD PRESSURE: 92 MMHG | BODY MASS INDEX: 30.56 KG/M2

## 2019-11-11 DIAGNOSIS — G95.20 CORD COMPRESSION MYELOPATHY: Primary | ICD-10-CM

## 2019-11-11 LAB
ALBUMIN SERPL BCP-MCNC: 3.8 G/DL (ref 3.5–5.2)
ALP SERPL-CCNC: 89 U/L (ref 55–135)
ALT SERPL W/O P-5'-P-CCNC: 16 U/L (ref 10–44)
ANION GAP SERPL CALC-SCNC: 8 MMOL/L (ref 8–16)
AST SERPL-CCNC: 14 U/L (ref 10–40)
BASOPHILS # BLD AUTO: 0.06 K/UL (ref 0–0.2)
BASOPHILS NFR BLD: 0.5 % (ref 0–1.9)
BILIRUB SERPL-MCNC: 0.3 MG/DL (ref 0.1–1)
BUN SERPL-MCNC: 12 MG/DL (ref 6–20)
CALCIUM SERPL-MCNC: 10.1 MG/DL (ref 8.7–10.5)
CHLORIDE SERPL-SCNC: 104 MMOL/L (ref 95–110)
CO2 SERPL-SCNC: 29 MMOL/L (ref 23–29)
CREAT SERPL-MCNC: 0.8 MG/DL (ref 0.5–1.4)
DIFFERENTIAL METHOD: ABNORMAL
EOSINOPHIL # BLD AUTO: 0.3 K/UL (ref 0–0.5)
EOSINOPHIL NFR BLD: 2.1 % (ref 0–8)
ERYTHROCYTE [DISTWIDTH] IN BLOOD BY AUTOMATED COUNT: 12.7 % (ref 11.5–14.5)
EST. GFR  (AFRICAN AMERICAN): >60 ML/MIN/1.73 M^2
EST. GFR  (NON AFRICAN AMERICAN): >60 ML/MIN/1.73 M^2
GLUCOSE SERPL-MCNC: 159 MG/DL (ref 70–110)
HCT VFR BLD AUTO: 35.6 % (ref 37–48.5)
HGB BLD-MCNC: 11.4 G/DL (ref 12–16)
IMM GRANULOCYTES # BLD AUTO: 0.03 K/UL (ref 0–0.04)
IMM GRANULOCYTES NFR BLD AUTO: 0.2 % (ref 0–0.5)
INR PPP: 1 (ref 0.8–1.2)
LYMPHOCYTES # BLD AUTO: 2.8 K/UL (ref 1–4.8)
LYMPHOCYTES NFR BLD: 21.4 % (ref 18–48)
MCH RBC QN AUTO: 28.6 PG (ref 27–31)
MCHC RBC AUTO-ENTMCNC: 32 G/DL (ref 32–36)
MCV RBC AUTO: 89 FL (ref 82–98)
MONOCYTES # BLD AUTO: 0.7 K/UL (ref 0.3–1)
MONOCYTES NFR BLD: 5.6 % (ref 4–15)
NEUTROPHILS # BLD AUTO: 9.2 K/UL (ref 1.8–7.7)
NEUTROPHILS NFR BLD: 70.2 % (ref 38–73)
NRBC BLD-RTO: 0 /100 WBC
PLATELET # BLD AUTO: 490 K/UL (ref 150–350)
PMV BLD AUTO: 10.1 FL (ref 9.2–12.9)
POTASSIUM SERPL-SCNC: 4 MMOL/L (ref 3.5–5.1)
PROT SERPL-MCNC: 8.3 G/DL (ref 6–8.4)
PROTHROMBIN TIME: 10.4 SEC (ref 9–12.5)
RBC # BLD AUTO: 3.98 M/UL (ref 4–5.4)
SODIUM SERPL-SCNC: 141 MMOL/L (ref 136–145)
WBC # BLD AUTO: 13.05 K/UL (ref 3.9–12.7)

## 2019-11-11 PROCEDURE — 85025 COMPLETE CBC W/AUTO DIFF WBC: CPT

## 2019-11-11 PROCEDURE — 99284 PR EMERGENCY DEPT VISIT,LEVEL IV: ICD-10-PCS | Mod: ,,, | Performed by: PHYSICIAN ASSISTANT

## 2019-11-11 PROCEDURE — 80053 COMPREHEN METABOLIC PANEL: CPT

## 2019-11-11 PROCEDURE — 99284 EMERGENCY DEPT VISIT MOD MDM: CPT | Mod: ,,, | Performed by: PHYSICIAN ASSISTANT

## 2019-11-11 PROCEDURE — 99284 EMERGENCY DEPT VISIT MOD MDM: CPT | Mod: 25

## 2019-11-11 PROCEDURE — 85610 PROTHROMBIN TIME: CPT

## 2019-11-11 RX ORDER — METHYLPREDNISOLONE 4 MG/1
TABLET ORAL
Qty: 1 PACKAGE | Refills: 0 | Status: ON HOLD | OUTPATIENT
Start: 2019-11-11 | End: 2019-11-19

## 2019-11-11 NOTE — ED TRIAGE NOTES
Pt is a 59 yr old female that presents to the ED today per her neurologist. Pt states she has been seeing a neurologist for a pinched nerve that is causing her to have weakness and stiffness to both arms and stiffness to the legs

## 2019-11-11 NOTE — PROGRESS NOTES
Reviewed Cspine MRI  Suggested immediate visit to the ER to be evaluated for Cspine Myelopathy  Which explains her ataxia and hand spasticity

## 2019-11-11 NOTE — DISCHARGE INSTRUCTIONS
Take Medrol Dosepak as directed.  Spine will call you and schedule an appointment as well as surgery.  You are provided with the number.  Return to the emergency department for extremity weakness, shortness of breath, lost control of bowels or bladder, or any concerning signs or symptoms.    Future Appointments   Date Time Provider Department Center   12/5/2019 11:00 AM Edward Hernandez MD [facility] [address] [facility]   12/23/2019  7:20 AM Janette Church MD [facility]       Our goal in the emergency department is to always give you outstanding care and exceptional service. You may receive a survey by mail or e-mail in the next week regarding your experience in our ED. We would greatly appreciate your completing and returning the survey. Your feedback provides us with a way to recognize our staff who give very good care and it helps us learn how to improve when your experience was below our aspiration of excellence.

## 2019-11-11 NOTE — ED PROVIDER NOTES
Encounter Date: 11/11/2019       History     Chief Complaint   Patient presents with    Abnormal Ct Scan     send for further eval after CT and MRI for c-spine     3:28 PM  Patient is a 59-year-old female who presents the ED with abnormal MRI results.  States for the past month and a half, she has been experiencing extremity weakness and ataxia.  She has already seen Neurology who ordered outpatient MRIs performed on 11/08/2019.  Patient received a phone call today and was instructed to report to the ED for emergent evaluation due to abnormal MRI of cervical spine.  Patient denies any worsening symptoms.  She still endorses her extremity weakness and gait abnormalities, which are unchanged.        Review of patient's allergies indicates:  No Known Allergies  Past Medical History:   Diagnosis Date    Diabetes mellitus, type 2     Hyperlipidemia     Hypertension     Thyroid disease      History reviewed. No pertinent surgical history.  No family history on file.  Social History     Tobacco Use    Smoking status: Never Smoker    Smokeless tobacco: Never Used   Substance Use Topics    Alcohol use: Not Currently    Drug use: Not on file     Review of Systems   Constitutional: Negative for chills and fever.   HENT: Negative for sore throat.    Eyes: Negative for photophobia.   Respiratory: Negative for shortness of breath.    Cardiovascular: Negative for chest pain.   Gastrointestinal: Negative for nausea.   Genitourinary: Negative for dysuria.   Musculoskeletal: Positive for gait problem. Negative for back pain.   Skin: Negative for rash.   Neurological: Positive for weakness. Negative for numbness.   Hematological: Does not bruise/bleed easily.       Physical Exam     Initial Vitals [11/11/19 1431]   BP Pulse Resp Temp SpO2   (!) 220/92 88 16 98.2 °F (36.8 °C) 99 %      MAP       --         Physical Exam    Vitals reviewed.  Constitutional: She appears well-developed and well-nourished. She is not diaphoretic.  No distress.   With cane.   HENT:   Head: Normocephalic and atraumatic.   Nose: Nose normal.   Eyes: Conjunctivae and EOM are normal.   Neck: Normal range of motion.   Abdominal: She exhibits no distension. There is no tenderness.   Neurological: She is alert and oriented to person, place, and time. No sensory deficit.   Weaker finger  on the left.  3/5 weakness in LUE.  Answering questions appropriately and following all commands.   Skin: Skin is warm and dry. No erythema. No pallor.   Psychiatric: She has a normal mood and affect. Thought content normal.         ED Course   Procedures  Labs Reviewed   CBC W/ AUTO DIFFERENTIAL - Abnormal; Notable for the following components:       Result Value    WBC 13.05 (*)     RBC 3.98 (*)     Hemoglobin 11.4 (*)     Hematocrit 35.6 (*)     Platelets 490 (*)     Gran # (ANC) 9.2 (*)     All other components within normal limits   COMPREHENSIVE METABOLIC PANEL - Abnormal; Notable for the following components:    Glucose 159 (*)     All other components within normal limits    Narrative:     ADD ON PT/INR PER ALIX HARVEY  11/11/2019  16:08    PROTIME-INR   PROTIME-INR    Narrative:     ADD ON PT/INR PER ALIX HARVEY  11/11/2019  16:08           Imaging Results           CT Cervical Spine Without Contrast (Final result)  Result time 11/11/19 18:38:14    Final result by Tray Uriostegui MD (11/11/19 18:38:14)                 Impression:      Redemonstration of multilevel degenerative change, most severe at C3-C4 which demonstrates a prominent posterior disc osteophyte complex/disc protrusion severely narrowing the spinal canal and creating significant mass effect on the adjacent cord.    This report was flagged in Epic as abnormal.    Electronically signed by resident: Dayday Doty  Date:    11/11/2019  Time:    18:14    Electronically signed by: Tray Uriostegui MD  Date:    11/11/2019  Time:    18:38             Narrative:    EXAMINATION:  CT CERVICAL SPINE WITHOUT  CONTRAST    CLINICAL HISTORY:  weakness, cervical myelopathy, DISH on XR;    TECHNIQUE:  Low dose axial images, sagittal and coronal reformations were performed though the cervical spine.  Contrast was not administered.    COMPARISON:  MRI cervical spine 11/08/2019, cervical spine radiographs 11/11/2019    FINDINGS:  Cervical spine demonstrates mild straightening of normal cervical lordosis.  No evidence of acute fracture or listhesis.  Redemonstration of moderate multilevel degenerative change in the form of uncovertebral spurring, posterior disc osteophyte complexes, and facet arthropathy; findings better characterized on recent MRI.  Of note, there is a prominent posterior disc osteophyte complex/disc protrusion at C3-C4 which demonstrates significant narrowing of the spinal canal and mass effect on the cord.  Mild apical fibrosis of bilateral lungs.  Soft tissues of the neck appear within normal limits.  Partial opacification of left mastoid air cells without dehiscence seen.  Partially visualized intra calvarial structures unremarkable.                               X-Ray Cervical Spine 2 or 3 Views (Final result)  Result time 11/11/19 16:13:15    Final result by Ramin Lopez III, MD (11/11/19 16:13:15)                 Impression:      DJD and DISH.      Electronically signed by: Ramin Lopez MD  Date:    11/11/2019  Time:    16:13             Narrative:    EXAMINATION:  XR CERVICAL SPINE 2 OR 3 VIEWS    CLINICAL HISTORY:  pain;    FINDINGS:  There is mild-moderate DJD between C3 and T1 with DISH.  The odontoid prevertebral soft tissues and posterior elements are intact.                                 Medical Decision Making:   History:   Old Medical Records: I decided to obtain old medical records.  Old Records Summarized: records from previous admission(s).       <> Summary of Records: MRI brain demyelinating protocol without acute processes. MRI c-spine showed concerning for compressive myelopathy and  cord edema.  Initial Assessment:   Patient is a 59-year-old female who presents the ED with abnormal MRI results.  MRI cervical spine reviewed today by neurosurgeon who recommend ED evaluation.    Differential Diagnosis:   Includes but is not limited to spinal cord edema, cord compression, fractures, dislocations, mass, infection, inflammation.  Clinical Tests:   Lab Tests: Ordered and Reviewed  Radiological Study: Reviewed  ED Management:  I saw patient in ED approximately 1 week ago for similar symptoms.  She denies any worsening symptoms.  Her exam has not changed with exam 1 week ago.  She got her outpatient MRI 3 days ago and received results today.  Was instructed to report to the ED due to concerns for compressive cord myelopathy. She is still anxious and hypertensive at this time.  I suspect that this is white-coat hypertension, however will continue to monitor her vitals.    3:33 PM. Ortho on spine today. Will discuss case with them for their evaluation and recommendations.    3:50 PM. Case discussed with Ortho.    5:00 PM. Patient will be signed out to remaining ED team pending CT cervicle spine results and Ortho's recommendation. Please refer to Anabella Schneider's progress note.    UPDATE: CT results consistent with MRI. No fractures seen.  Ortho recommend prescribing Medrol Dosepak.  They will closely follow up with her and plan to schedule surgery for decompression early next week.  Refer to their note.  Consult appreciated.  Patient discharged in stable condition with return precautions.    Other:   I have discussed this case with another health care provider.    I have reviewed patient's chart and discussed this case with my supervising MD.     Anusha Llanos PA-C  Emergent Department  [facility]  Spectralink #[phone] or #[phone]      Future Appointments   Date Time Provider Department Center   11/13/2019  2:45 PM Miguel Cruz MD [facility]   12/5/2019 11:00 AM Edward Hernandez MD  [facility] [address] [facility]   12/23/2019  7:20 AM Janette Church MD [facility]                 Attending Attestation:     Physician Attestation Statement for NP/PA:   I discussed this assessment and plan of this patient with the NP/PA, but I did not personally examine the patient. The face to face encounter was performed by the NP/PA.                                Clinical Impression:       ICD-10-CM ICD-9-CM   1. Cord compression myelopathy G95.20 336.9         Disposition:   Disposition: Discharged  Condition: Stable                     Anusha Llanos PA-C  11/12/19 1141       Omid Pillai III, MD  11/13/19 0804

## 2019-11-12 ENCOUNTER — PATIENT OUTREACH (OUTPATIENT)
Dept: ADMINISTRATIVE | Facility: OTHER | Age: 59
End: 2019-11-12

## 2019-11-12 DIAGNOSIS — G95.9 CERVICAL MYELOPATHY: Primary | ICD-10-CM

## 2019-11-12 DIAGNOSIS — Z12.11 ENCOUNTER FOR FIT (FECAL IMMUNOCHEMICAL TEST) SCREENING: Primary | ICD-10-CM

## 2019-11-12 DIAGNOSIS — Z12.31 ENCOUNTER FOR SCREENING MAMMOGRAM FOR MALIGNANT NEOPLASM OF BREAST: ICD-10-CM

## 2019-11-12 RX ORDER — MUPIROCIN 20 MG/G
OINTMENT TOPICAL
Status: CANCELLED | OUTPATIENT
Start: 2019-11-12

## 2019-11-12 RX ORDER — SODIUM CHLORIDE 9 MG/ML
INJECTION, SOLUTION INTRAVENOUS CONTINUOUS
Status: CANCELLED | OUTPATIENT
Start: 2019-11-12

## 2019-11-12 NOTE — PROVIDER PROGRESS NOTES - EMERGENCY DEPT.
Encounter Date: 11/11/2019    ED Physician Progress Notes        Physician Note:   Patient signed out to me by off going ED team.  Patient is a 59-year-old female with history of extremity weakness and ataxia.  She had an abnormal MRI and was sent to the ED for evaluation.    At time of sign out, patient was pending CT scan and final orthopedic disposition.    CT scan consistent with MRI results.  Discussed with Orthopedics who independently reviewed imaging.  Therefore patient stable for discharge. They will follow up and schedule surgery.    Discharge with a prescription for Medrol Dosepak.  Patient was given strict ED return precautions and expressed understanding.  She is comfortable with the plan.

## 2019-11-12 NOTE — ASSESSMENT & PLAN NOTE
Pt is a 60 yo F with findings on history and exam consistent with cervical myelopathy and C3-4 cervical stenosis seen on MRI and CT. She would likely benefit from surgical decompression. She has had relatively stable symptoms for over a month now. Imaging obtained in ER. Will have patient f/u in clinic to schedule surgery likely early next week. Discharged with medrol dose pack and instructions to return to ER if she has rapid worsening of symptoms or new concerning symptoms. Will call to schedule f/u in spine clinic.

## 2019-11-12 NOTE — SUBJECTIVE & OBJECTIVE
"Past Medical History:   Diagnosis Date    Diabetes mellitus, type 2     Hyperlipidemia     Hypertension     Thyroid disease        History reviewed. No pertinent surgical history.    Review of patient's allergies indicates:  No Known Allergies    No current facility-administered medications for this encounter.      Current Outpatient Medications   Medication Sig    amLODIPine (NORVASC) 5 MG tablet Take 5 mg by mouth.    atorvastatin (LIPITOR) 10 MG tablet Take 10 mg by mouth once daily.    glimepiride (AMARYL) 4 MG tablet Take 4 mg by mouth daily with breakfast.     levothyroxine (SYNTHROID) 50 MCG tablet Take 50 mcg by mouth once daily.    losartan (COZAAR) 25 MG tablet Take 30 mg by mouth once daily.    metFORMIN (GLUCOPHAGE) 1000 MG tablet Take 1,000 mg by mouth 2 (two) times daily with meals.     methylPREDNISolone (MEDROL DOSEPACK) 4 mg tablet Take pack as directed.     Family History     None        Tobacco Use    Smoking status: Never Smoker    Smokeless tobacco: Never Used   Substance and Sexual Activity    Alcohol use: Not Currently    Drug use: Not on file    Sexual activity: Not on file     ROS See ER Provider ROS    Objective:     Vital Signs (Most Recent):  Temp: 98.2 °F (36.8 °C) (11/11/19 1431)  Pulse: 88 (11/11/19 1431)  Resp: 16 (11/11/19 1431)  BP: (!) 220/92(took bp meds) (11/11/19 1431)  SpO2: 99 % (11/11/19 1431) Vital Signs (24h Range):  Temp:  [98.2 °F (36.8 °C)] 98.2 °F (36.8 °C)  Pulse:  [88] 88  Resp:  [16] 16  SpO2:  [99 %] 99 %  BP: (220)/(92) 220/92     Weight: 81.2 kg (179 lb)  Height: 5' 4" (162.6 cm)  Body mass index is 30.73 kg/m².    No intake or output data in the 24 hours ending 11/12/19 0040    Ortho/SPM Exam    PHYSICAL EXAM:  Awake/Alert/Oriented x3, No acute distress, Afebrile, Vital signs stable  Normocephalic, atraumatic  Palpable distal pulses  Good inspiratory effort with unlaboured breathing    No skin breakdown or lesions over spine  Non TTP over spinous " processes with no palpable step offs  Non painful ROM of neck      Motor            RIGHT  LEFT  Deltoid(C5)        5/5    5/5  Biceps(C5)         5/5    5/5  Extensor Carpi Radialis Longus(C6)    5/5    5/5   Triceps(C7)       5/5    5/5     Flexor Carpi Radialis(C7)     5/5    5/5  Flexor Digitorum Superficialis(C8)    5/5    5/5  Interossei(T1)       3/5    3/5     Sensation (a=absent, i=impaired, n=normal)       RIGHT  LEFT  C5 dermatome       n     n  C6 dermatome       n     n  C7 dermatome       n     n  C8 dermatome       n     n  T1 dermatome       n     n    Reflexes       RIGHT  LEFT  Triceps        1+     1+  Biceps         2+     2+  Brachioradialis       2+           2+  Hoffmans's       neg   pos      Motor             RIGHT  LEFT  Iliopsoas (L2,3)       5/5    5/5  Quadriceps (L3,4)      5/5    5/5   Tibialis Anterior (L4.5)         5/5    5/5   Extensor Halucis Longus (L5)    5/5    5/5     Gastrocnemius/Soleus (S1)     5/5    5/5  Flexor Hallucis Longus(S2)     5/5    5/5    Sensation (a=absent, i=impaired, n=normal)       RIGHT   LEFT  L2 dermatome       n     n  L3 dermatome       n     n  L4 dermatome       n     n  L5 dermatome       n     n  S1 dermatome       n     n  S2 dermatome       n     n    Reflexes        RIGHT  LEFT  Patellar       2+     2+  Achilles       2+     2+  Babinski      neg    neg  Clonus          neg    neg        Significant Labs:   CBC:   Recent Labs   Lab 11/11/19  1529   WBC 13.05*   HGB 11.4*   HCT 35.6*   *     All pertinent labs within the past 24 hours have been reviewed.    Significant Imaging: I have reviewed and interpreted all pertinent imaging results/findings.     MRI C spine shows severe cord compression and myelomalacia at C3-4 vertebral level  XR C spine and CT C spine show multilevel degenerative changes with no fx or dislocation.   MRI brain normal

## 2019-11-12 NOTE — HPI
Patient is a 59-year-old female w/ pmh HTN, HLD, type 2 DM who presents the ED with complaint of gait abnormalities after told to present to ER due to MRI results obtained by neurology clinic. Patient states 1.5 month history of gait imbalance, hand clumsiness, and hand weakness. She reports feeling like she does not have control of her legs when she walks. She reports funny feeling in bilateral hands which is hard for her to describe but denies numbness in upper or lower extremities. She reports weakness in fingers of left hand worse than right but no other georgi weakness in bilateral upper extremities. She does consider her balance difficulties as weakness. She reports bladder incontinence over the last several weeks which is new for her. No bowel incontinence. She denies perianal paresthesias. She has been seeing neurology as an outpatient for these symptoms who obtained MRI of brain and c-spine and referred to ER after results read showing significant cord compression at C3-4. She was fully ambulatory at baseline before these symptoms began and does not take any blood thinners.

## 2019-11-12 NOTE — H&P (VIEW-ONLY)
[facility]  Orthopedics  Consult Note    Patient Name: Lala Saxena  MRN: [id number]  Admission Date: 11/11/2019  Hospital Length of Stay: 0 days  Attending Provider: No att. providers found  Primary Care Provider: Edward Hernandez MD    Patient information was obtained from patient, relative(s) and ER records.     Consults  Subjective:     Principal Problem:<principal problem not specified>    Chief Complaint:   Chief Complaint   Patient presents with    Abnormal Ct Scan     send for further eval after CT and MRI for c-spine        HPI: Patient is a 59-year-old female w/ pmh HTN, HLD, type 2 DM who presents the ED with complaint of gait abnormalities after told to present to ER due to MRI results obtained by neurology clinic. Patient states 1.5 month history of gait imbalance, hand clumsiness, and hand weakness. She reports feeling like she does not have control of her legs when she walks. She reports funny feeling in bilateral hands which is hard for her to describe but denies numbness in upper or lower extremities. She reports weakness in fingers of left hand worse than right but no other georgi weakness in bilateral upper extremities. She does consider her balance difficulties as weakness. She reports bladder incontinence over the last several weeks which is new for her. No bowel incontinence. She denies perianal paresthesias. She has been seeing neurology as an outpatient for these symptoms who obtained MRI of brain and c-spine and referred to ER after results read showing significant cord compression at C3-4. She was fully ambulatory at baseline before these symptoms began and does not take any blood thinners.      Past Medical History:   Diagnosis Date    Diabetes mellitus, type 2     Hyperlipidemia     Hypertension     Thyroid disease        History reviewed. No pertinent surgical history.    Review of patient's allergies indicates:  No Known Allergies    No current  "facility-administered medications for this encounter.      Current Outpatient Medications   Medication Sig    amLODIPine (NORVASC) 5 MG tablet Take 5 mg by mouth.    atorvastatin (LIPITOR) 10 MG tablet Take 10 mg by mouth once daily.    glimepiride (AMARYL) 4 MG tablet Take 4 mg by mouth daily with breakfast.     levothyroxine (SYNTHROID) 50 MCG tablet Take 50 mcg by mouth once daily.    losartan (COZAAR) 25 MG tablet Take 30 mg by mouth once daily.    metFORMIN (GLUCOPHAGE) 1000 MG tablet Take 1,000 mg by mouth 2 (two) times daily with meals.     methylPREDNISolone (MEDROL DOSEPACK) 4 mg tablet Take pack as directed.     Family History     None        Tobacco Use    Smoking status: Never Smoker    Smokeless tobacco: Never Used   Substance and Sexual Activity    Alcohol use: Not Currently    Drug use: Not on file    Sexual activity: Not on file     ROS See ER Provider ROS    Objective:     Vital Signs (Most Recent):  Temp: 98.2 °F (36.8 °C) (11/11/19 1431)  Pulse: 88 (11/11/19 1431)  Resp: 16 (11/11/19 1431)  BP: (!) 220/92(took bp meds) (11/11/19 1431)  SpO2: 99 % (11/11/19 1431) Vital Signs (24h Range):  Temp:  [98.2 °F (36.8 °C)] 98.2 °F (36.8 °C)  Pulse:  [88] 88  Resp:  [16] 16  SpO2:  [99 %] 99 %  BP: (220)/(92) 220/92     Weight: 81.2 kg (179 lb)  Height: 5' 4" (162.6 cm)  Body mass index is 30.73 kg/m².    No intake or output data in the 24 hours ending 11/12/19 0040    Ortho/SPM Exam    PHYSICAL EXAM:  Awake/Alert/Oriented x3, No acute distress, Afebrile, Vital signs stable  Normocephalic, atraumatic  Palpable distal pulses  Good inspiratory effort with unlaboured breathing    No skin breakdown or lesions over spine  Non TTP over spinous processes with no palpable step offs  Non painful ROM of neck      Motor            RIGHT  LEFT  Deltoid(C5)        5/5    5/5  Biceps(C5)         5/5    5/5  Extensor Carpi Radialis Longus(C6)    5/5    5/5   Triceps(C7)       5/5    5/5     Flexor Carpi " Radialis(C7)     5/5    5/5  Flexor Digitorum Superficialis(C8)    5/5    5/5  Interossei(T1)       3/5    3/5     Sensation (a=absent, i=impaired, n=normal)       RIGHT  LEFT  C5 dermatome       n     n  C6 dermatome       n     n  C7 dermatome       n     n  C8 dermatome       n     n  T1 dermatome       n     n    Reflexes       RIGHT  LEFT  Triceps        1+     1+  Biceps         2+     2+  Brachioradialis       2+           2+  Hoffmans's       neg   pos      Motor             RIGHT  LEFT  Iliopsoas (L2,3)       5/5    5/5  Quadriceps (L3,4)      5/5    5/5   Tibialis Anterior (L4.5)         5/5    5/5   Extensor Halucis Longus (L5)    5/5    5/5     Gastrocnemius/Soleus (S1)     5/5    5/5  Flexor Hallucis Longus(S2)     5/5    5/5    Sensation (a=absent, i=impaired, n=normal)       RIGHT   LEFT  L2 dermatome       n     n  L3 dermatome       n     n  L4 dermatome       n     n  L5 dermatome       n     n  S1 dermatome       n     n  S2 dermatome       n     n    Reflexes        RIGHT  LEFT  Patellar       2+     2+  Achilles       2+     2+  Babinski      neg    neg  Clonus          neg    neg        Significant Labs:   CBC:   Recent Labs   Lab 11/11/19  1529   WBC 13.05*   HGB 11.4*   HCT 35.6*   *     All pertinent labs within the past 24 hours have been reviewed.    Significant Imaging: I have reviewed and interpreted all pertinent imaging results/findings.     MRI C spine shows severe cord compression and myelomalacia at C3-4 vertebral level  XR C spine and CT C spine show multilevel degenerative changes with no fx or dislocation.   MRI brain normal    Assessment/Plan:     Cervical myelopathy  Pt is a 58 yo F with findings on history and exam consistent with cervical myelopathy and C3-4 cervical stenosis seen on MRI and CT. She would likely benefit from surgical decompression. She has had relatively stable symptoms for over a month now. Imaging obtained in ER. Will have patient f/u in clinic to  schedule surgery likely early next week. Discharged with medrol dose pack and instructions to return to ER if she has rapid worsening of symptoms or new concerning symptoms. Will call to schedule f/u in spine clinic.          Donis Caro MD  Orthopedics  [facility]

## 2019-11-13 ENCOUNTER — TELEPHONE (OUTPATIENT)
Dept: PREADMISSION TESTING | Facility: HOSPITAL | Age: 59
End: 2019-11-13

## 2019-11-13 ENCOUNTER — OFFICE VISIT (OUTPATIENT)
Dept: ORTHOPEDICS | Facility: CLINIC | Age: 59
End: 2019-11-13
Payer: COMMERCIAL

## 2019-11-13 ENCOUNTER — TELEPHONE (OUTPATIENT)
Dept: INTERNAL MEDICINE | Facility: CLINIC | Age: 59
End: 2019-11-13

## 2019-11-13 ENCOUNTER — HOSPITAL ENCOUNTER (OUTPATIENT)
Dept: PREADMISSION TESTING | Facility: HOSPITAL | Age: 59
Discharge: HOME | End: 2019-11-13
Attending: ANESTHESIOLOGY
Payer: COMMERCIAL

## 2019-11-13 ENCOUNTER — HOSPITAL ENCOUNTER (OUTPATIENT)
Dept: CARDIOLOGY | Facility: CLINIC | Age: 59
Discharge: HOME | End: 2019-11-13
Attending: ANESTHESIOLOGY
Payer: COMMERCIAL

## 2019-11-13 ENCOUNTER — ANESTHESIA EVENT (OUTPATIENT)
Dept: SURGERY | Facility: HOSPITAL | Age: 59
DRG: 472 | End: 2019-11-13
Payer: COMMERCIAL

## 2019-11-13 VITALS
SYSTOLIC BLOOD PRESSURE: 206 MMHG | HEART RATE: 86 BPM | OXYGEN SATURATION: 100 % | RESPIRATION RATE: 18 BRPM | WEIGHT: 174 LBS | DIASTOLIC BLOOD PRESSURE: 86 MMHG | HEIGHT: 64 IN | BODY MASS INDEX: 29.71 KG/M2 | TEMPERATURE: 97 F

## 2019-11-13 VITALS — HEIGHT: 64 IN | BODY MASS INDEX: 29.7 KG/M2 | WEIGHT: 173.94 LBS

## 2019-11-13 DIAGNOSIS — Z01.818 PREOPERATIVE TESTING: Primary | ICD-10-CM

## 2019-11-13 DIAGNOSIS — G95.9 CERVICAL MYELOPATHY: Primary | ICD-10-CM

## 2019-11-13 DIAGNOSIS — Z01.818 PREOPERATIVE TESTING: ICD-10-CM

## 2019-11-13 PROCEDURE — 93010 ELECTROCARDIOGRAM REPORT: CPT | Mod: S$GLB,,, | Performed by: INTERNAL MEDICINE

## 2019-11-13 PROCEDURE — 93005 EKG 12-LEAD: ICD-10-PCS | Mod: S$GLB,,, | Performed by: ANESTHESIOLOGY

## 2019-11-13 PROCEDURE — 93005 ELECTROCARDIOGRAM TRACING: CPT | Mod: S$GLB,,, | Performed by: ANESTHESIOLOGY

## 2019-11-13 PROCEDURE — 99999 PR PBB SHADOW E&M-EST. PATIENT-LVL III: ICD-10-PCS | Mod: PBBFAC,,, | Performed by: ORTHOPAEDIC SURGERY

## 2019-11-13 PROCEDURE — 99999 PR PBB SHADOW E&M-EST. PATIENT-LVL III: CPT | Mod: PBBFAC,,, | Performed by: ORTHOPAEDIC SURGERY

## 2019-11-13 PROCEDURE — 93010 EKG 12-LEAD: ICD-10-PCS | Mod: S$GLB,,, | Performed by: INTERNAL MEDICINE

## 2019-11-13 PROCEDURE — 3008F PR BODY MASS INDEX (BMI) DOCUMENTED: ICD-10-PCS | Mod: CPTII,S$GLB,, | Performed by: ORTHOPAEDIC SURGERY

## 2019-11-13 PROCEDURE — 99202 OFFICE O/P NEW SF 15 MIN: CPT | Mod: S$GLB,,, | Performed by: ORTHOPAEDIC SURGERY

## 2019-11-13 PROCEDURE — 3008F BODY MASS INDEX DOCD: CPT | Mod: CPTII,S$GLB,, | Performed by: ORTHOPAEDIC SURGERY

## 2019-11-13 PROCEDURE — 99202 PR OFFICE/OUTPT VISIT, NEW, LEVL II, 15-29 MIN: ICD-10-PCS | Mod: S$GLB,,, | Performed by: ORTHOPAEDIC SURGERY

## 2019-11-13 NOTE — PRE-PROCEDURE INSTRUCTIONS
Instructions given to patient and her .Preop instructions given. Hold asa, asa containing products, nsaids, vitamins and supplements one week prior to surgery. Start holding today. Medication instructions given.  Shower the night before surgery and the morning of surgery with an antibacterial soap( hibiclens or dial antibacterial soap).  Nothing on the skin once shower. Do not apply any deodorant, lotion, powder, perfume,or aftershave.No makeupo or moisturizer. No fingernail polish or jewelry going to surgery. May have solid foods, gum, and hard candy until 8 hours before surgery/procedure time.  May have clear liquids( water, gatorade, powerade or apple juice) until 2 hours prior to surgery/procedure time.  No red drinks. If in doubt , drink water. Nothing to drink 2 hours before arrival time for surgery/procedure. If you are told to take medication in the morning of surgery, it may be taken with a sip of water. If your doctor tells you something different pertaining to when to stop eating or drinking, follow your doctor's instructions. Directions given to the surgery center.Call for changes in status or questions. Verbalizes understanding.

## 2019-11-13 NOTE — PRE ADMISSION SCREENING
Anesthesia Assessment: Preoperative EQUATION    Planned Procedure: Procedure(s) (LRB):  FUSION, SPINE, CERVICAL (N/A)  Requested Anesthesia Type:Choice  Surgeon: Miguel Cruz MD  Service: Neurosurgery  Known or anticipated Date of Surgery:11/19/2019    Surgeon notes: reviewed    Electronic QUestionnaire Assessment completed via nurse interview with patient.   NO AQ  Triage considerations:     Previous anesthesia records:No problems and Not available    Last PCP note: within 1 month , within [facility]   Subspecialty notes: Neurology    Other important co-morbidities: PER Epic: HTN, HLD,DM2 &  HYPOTHYROIDISM     Tests already available:  Available tests,  within 1 month , within [facility] .11/11/2019 CMP, CBC, PT/INR,XRAY CERVICAL SPINE 2 OR 3 VIEWS, CT CERVICAL SPINE W/O CONTRAST 11/8/2019 UA, MRI CERVICAL SPINE DEMYELINATING W W/O CONTRAST            Instructions given. (See in Nurse's note)    Optimization:  Anesthesia Preop Clinic Assessment  Indicated    Medical Opinion Indicated           Plan:    Testing:  T&S, EKG and TSH   Pre-anesthesia  visit       Visit focus: concerns in complex and/or prolonged anesthesia     Consultation:Patient's PCP for a statement of optimization      Patient  has previously scheduled Medical Appointment:NONE    Navigation: Tests Scheduled. TBD             Consults scheduled.TBD             Results will be tracked by Preop Clinic.                 11/12 Discussed case with Dr. Romo. Recommended poc visit.

## 2019-11-13 NOTE — ANESTHESIA PREPROCEDURE EVALUATION
Anesthesia Assessment: Preoperative EQUATION     Planned Procedure: Procedure(s) (LRB):  FUSION, SPINE, CERVICAL (N/A)  Requested Anesthesia Type:Choice  Surgeon: Miguel Cruz MD  Service: Neurosurgery  Known or anticipated Date of Surgery:11/19/2019     Surgeon notes: Reviewed  Electronic QUestionnaire Assessment completed via nurse interview with patient.   NO AQ  Triage considerations:      Previous anesthesia records:No problems and Not available     Last PCP note: within 1 month , within [facility]   Subspecialty notes: Neurology     Other important co-morbidities: PER Epic: HTN, HLD,DM2 &  HYPOTHYROIDISM     Tests already available:  Available tests,  within 1 month , within [facility] .11/11/2019 CMP, CBC, PT/INR,XRAY CERVICAL SPINE 2 OR 3 VIEWS, CT CERVICAL SPINE W/O CONTRAST 11/8/2019 UA, MRI CERVICAL SPINE DEMYELINATING W W/O CONTRAST                            Instructions given. (See in Nurse's note)     Optimization:  Anesthesia Preop Clinic Assessment  Indicated    Medical Opinion Indicated                                        Plan:    Testing:  T&S, EKG and TSH   Pre-anesthesia  visit                                        Visit focus: concerns in complex and/or prolonged anesthesia                           Consultation:Patient's PCP for a statement of optimization                            Patient  has previously scheduled Medical Appointment:NONE     Navigation: Tests Scheduled. TBD                        Consults scheduled.TBD                        Results will be tracked by Preop Clinic.                           11/12 Discussed case with Dr. Romo. Recommended poc visit.      Electronically signed by Chelsea Hauser RN at 11/13/2019  8:58 AM       Pre-admit on 11/19/2019          Detailed Report    11/13 Medical clearance given by Dr. Edward Hernandez on 11/13.   11/14 Labs and EKG resulted and noted.   Chelsea Hauser RN BSN           [facility]  Anesthesia Pre-Operative  Evaluation         Patient Name: Lala Saxena  YOB: 1960  MRN: [id number]    SUBJECTIVE:     Pre-operative evaluation for Procedure(s) (LRB):  FUSION, SPINE, CERVICAL (N/A)     11/13/2019    Lala Saxena is a 59 y.o. female w/ a significant PMHx of DM2, HTN, HLD, hypothyroidism, and cervical myelopathy. She has a 1.5 month history of gait imbalance, hand clumsiness, and hand weakness. She reports bladder incontinence over the last several weeks which is new for her. No bowel incontinence. She denies perianal paresthesias. MRI of brain and c-spine and referred to ER after results read showing significant cord compression at C3-4.     Patient now presents for the above procedure(s).      LDA: None documented.    Prev airway: None documented.    Drips: None documented.    Past Medical History:   Diagnosis Date    Diabetes mellitus, type 2     Hyperlipidemia     Hypertension     Thyroid disease        Patient Active Problem List   Diagnosis    Ataxia    Left hand weakness    Type 2 diabetes mellitus, without long-term current use of insulin    Essential hypertension    Cervical myelopathy       Review of patient's allergies indicates:  No Known Allergies    Current Inpatient Medications:      Current Outpatient Medications on File Prior to Encounter   Medication Sig Dispense Refill    amLODIPine (NORVASC) 5 MG tablet Take 5 mg by mouth.      atorvastatin (LIPITOR) 10 MG tablet Take 10 mg by mouth once daily.      glimepiride (AMARYL) 4 MG tablet Take 4 mg by mouth daily with breakfast.       levothyroxine (SYNTHROID) 50 MCG tablet Take 50 mcg by mouth once daily.      losartan (COZAAR) 25 MG tablet Take 30 mg by mouth once daily.      metFORMIN (GLUCOPHAGE) 1000 MG tablet Take 1,000 mg by mouth 2 (two) times daily with meals.       methylPREDNISolone (MEDROL DOSEPACK) 4 mg tablet Take pack as directed. 1 Package 0     No current facility-administered medications on file prior to  encounter.        No past surgical history on file.    Social History     Socioeconomic History    Marital status:      Spouse name: Not on file    Number of children: Not on file    Years of education: Not on file    Highest education level: Not on file   Occupational History    Not on file   Social Needs    Financial resource strain: Not on file    Food insecurity:     Worry: Not on file     Inability: Not on file    Transportation needs:     Medical: Not on file     Non-medical: Not on file   Tobacco Use    Smoking status: Never Smoker    Smokeless tobacco: Never Used   Substance and Sexual Activity    Alcohol use: Not Currently    Drug use: Not on file    Sexual activity: Not on file   Lifestyle    Physical activity:     Days per week: Not on file     Minutes per session: Not on file    Stress: Not on file   Relationships    Social connections:     Talks on phone: Not on file     Gets together: Not on file     Attends [employer] service: Not on file     Active member of club or organization: Not on file     Attends meetings of clubs or organizations: Not on file     Relationship status: Not on file   Other Topics Concern    Not on file   Social History Narrative    Not on file       OBJECTIVE:     Vital Signs Range (Last 24H):         Significant Labs:  Lab Results   Component Value Date    WBC 13.05 (H) 11/11/2019    HGB 11.4 (L) 11/11/2019    HCT 35.6 (L) 11/11/2019     (H) 11/11/2019    ALT 16 11/11/2019    AST 14 11/11/2019     11/11/2019    K 4.0 11/11/2019     11/11/2019    CREATININE 0.8 11/11/2019    BUN 12 11/11/2019    CO2 29 11/11/2019    INR 1.0 11/11/2019       Diagnostic Studies: No relevant studies.    EKG: No results found for this or any previous visit.    ECHOCARDIOGRAM:  TTE:  No results found for this or any previous visit.        ASSESSMENT/PLAN:         Anesthesia Evaluation    I have reviewed the Patient Summary Reports.        Review of  Systems  Anesthesia Hx:  History of prior surgery of interest to airway management or planning: Previous anesthesia: Epidural   Social:  Non-Smoker    Hematology/Oncology:     Oncology Normal     Cardiovascular:   Hypertension hyperlipidemia    Pulmonary:  Pulmonary Normal    Renal/:  Renal/ Normal     Hepatic/GI:  Hepatic/GI Normal    Musculoskeletal:  Spine Disorders: cervical    Neurological:  Neurology Normal    Endocrine:   Diabetes Hypothyroidism        Physical Exam  General:  Well nourished    Airway/Jaw/Neck:  Airway Findings: Mouth Opening: Normal Tongue: Normal  General Airway Assessment: Adult  Mallampati: II  TM Distance: Normal, at least 6 cm  Jaw/Neck Findings:  Neck ROM: Extension Decreased, Mild      Dental:  Dental Findings: In tact   Chest/Lungs:  Chest/Lungs Findings: Clear to auscultation, Normal Respiratory Rate     Heart/Vascular:  Heart Findings: Rate: Normal  Rhythm: Regular Rhythm  Sounds: Normal        Mental Status:  Mental Status Findings:  Cooperative, Alert and Oriented         Anesthesia Plan  Type of Anesthesia, risks & benefits discussed:  Anesthesia Type:  general  Patient's Preference:   Intra-op Monitoring Plan: standard ASA monitors  Intra-op Monitoring Plan Comments:   Post Op Pain Control Plan: multimodal analgesia  Post Op Pain Control Plan Comments:   Induction:   IV  Beta Blocker:         Informed Consent: Patient understands risks and agrees with Anesthesia plan.  Questions answered. Anesthesia consent signed with patient.  ASA Score: 2     Day of Surgery Review of History & Physical:    H&P update referred to the surgeon.         Ready For Surgery From Anesthesia Perspective.

## 2019-11-13 NOTE — PRE-PROCEDURE INSTRUCTIONS
Patient stated has not had any problem with anesthesia in the past. She said she had a c section. Will need medical clearance from your PCP, Dr. Edward Hernandez. Recently seen by him on 11/7. May be able to clear per chart but may need another appt with her PCP. Will need labs, ekg and poc appt. Our  will call to set up these appts.Preop instructions given. Hold asa, asa containing products, nsaids, vitamins and supplements one week prior to surgery.Verbalizes understanding.

## 2019-11-13 NOTE — DISCHARGE INSTRUCTIONS
Your surgery has been scheduled for:_____Tuesday 11/19/2019_____________________________________    You should report to:  ____[facility], located on the [place] side of the first floor of the           [facility] ([phone])  __x__[facility], located on the [facility] side of the            Second floor of the [facility] ([phone])  ____3rd Floor [facility] located on the [facility] side of the [facility] [phone]  Please Note   - Tell your doctor if you take Aspirin, products containing Aspirin, herbal medications  or blood thinners, such as Coumadin, Ticlid, or Plavix.  (Consult your provider regarding holding or stopping before surgery).  - Arrange for someone to drive you home following surgery.  You will not be allowed to leave the surgical facility alone or drive yourself home following sedation and anesthesia.  Before Surgery  - Stop taking all vitamins/ herbal medications 14days prior to surgery  - No Motrin/Advil (Ibuprofen) 1 day before surgery  - No Aleve (Naproxen) 7 days before surgery  - Stop Taking Asprin, products containing Asprin _____days before surgery  - Stop taking blood thinners_______days before surgery  - No Goody's/BC  Powder 7 days before surgery  - Refrain from drinking alcoholic beverages for 24hours before and after surgery  - Stop or limit smoking _________days before surgery  - You may take Tylenol for pain  Night before Surgery   Stop ALL solid food, gum, candy (including vitamins) 8 hours before arrival time.  (Please note: If your surgeon gives you different eating and drinking instructions, please follow surgeon's directions.)   Stop all CLOUDY liquids: coffee with creamer, formula, tube feeds, cloudy juices, non-human milk and breast milk with additives, 6 hours prior to arrival time.   Stop plain breast milk 4 hours prior to arrival time.   The patient should be  ENCOURAGED to drink carbohydrate-rich clear liquids (sports drinks, clear juices) until 2 hours prior to arrival time.   CLEAR liquids include only water, black coffee NO creamer, clear oral rehydration drinks, clear sports drinks or clear fruit juices (no orange juice, no pulpy juices, no apple cider). Advise patients if they can read newsprint through the liquid, it qualifies as clear liquid.    IF IN DOUBT, drink water instead.   - Take a shower or bath (shower is recommended).  Bathe with Hibiclens soap or an antibacterial soap from the neck down.  If not supplied by your surgeon, hibiclens soap will need to be purchased over the counter in pharmacy.  Rinse soap off thoroughly.  - Shampoo your hair with your regular shampoo  The Day of Surgery  · NOTHING TO  DRINK 2 hours before arrival time. If you are told to take medication on the morning of surgery, it may be taken with a sip of water.   - Take another bath or shower with hibiclens or any antibacterial soap, to reduce the chance of infection.  - Take heart and blood pressure medications with a small sip of water, as advised by the perioperative team.  - Do not take fluid pills  - You may brush your teeth and rinse your mouth, but do not swall any additional water.   - Do not apply perfumes, powder, body lotions or deodorant on the day of surgery.  - Nail polish should be removed.  - Do not wear makeup or moisturizer  - Wear comfortable clothes, such as a button front shirt and loose fitting pants.  - Leave all jewelry, including body piercings, and valuables at home.    - Bring any devices you will neeed after surgery such as crutches or canes.  - If you have sleep apnea, please bring your CPAP machine  In the event that your physical condition changes including the onset of a cold or respiratory illness, or if you have to delay or cancel your surgery, please notify your surgeon.  Anesthesia: General Anesthesia     You are watched continuously during your  procedure by your anesthesia provider.     Youre due to have surgery. During surgery, youll be given medicine called anesthesia or anesthetic. This will keep you comfortable and pain-free. Your anesthesia provider will use general anesthesia.  What is general anesthesia?  General anesthesia puts you into a state like deep sleep. It goes into the bloodstream (IV anesthetics), into the lungs (gas anesthetics), or both. You feel nothing during the procedure. You will not remember it. During the procedure, the anesthesia provider monitors you continuously. He or she checks your heart rate and rhythm, blood pressure, breathing, and blood oxygen.  · IV anesthetics. IV anesthetics are given through an IV line in your arm. Theyre often given first. This is so you are asleep before a gas anesthetic is started. Some kinds of IV anesthetics relieve pain. Others relax you. Your doctor will decide which kind is best in your case.  · Gas anesthetics. Gas anesthetics are breathed into the lungs. They are often used to keep you asleep. They can be given through a facemask or a tube placed in your larynx or trachea (breathing tube).  ? If you have a facemask, your anesthesia provider will most likely place it over your nose and mouth while youre still awake. Youll breathe oxygen through the mask as your IV anesthetic is started. Gas anesthetic may be added through the mask.  ? If you have a tube in the larynx or trachea, it will be inserted into your throat after youre asleep.  Anesthesia tools and medicines  You will likely have:  · IV anesthetics. These are put into an IV line into your bloodstream.  · Gas anesthetics. You breathe these anesthetics into your lungs, where they pass into your bloodstream.  · Pulse oximeter. This is a small clip that is attached to the end of your finger. This measures your blood oxygen level.  · Electrocardiography leads (electrodes). These are small sticky pads that are placed on your chest.  They record your heart rate and rhythm.  · Blood pressure cuff. This reads your blood pressure.  Risks and possible complications  General anesthesia has some risks. These include:  · Breathing problems  · Nausea and vomiting  · Sore throat or hoarseness (usually temporary)  · Allergic reaction to the anesthetic  · Irregular heartbeat (rare)  · Cardiac arrest (rare)   Anesthesia safety  · Follow all instructions you are given for how long not to eat or drink before your procedure.  · Be sure your doctor knows what medicines and drugs you take. This includes over-the-counter medicines, herbs, supplements, alcohol or other drugs. You will be asked when those were last taken.  · Have an adult family member or friend drive you home after the procedure.  · For the first 24 hours after your surgery:  ? Do not drive or use heavy equipment.  ? Do not make important decisions or sign legal documents. If important decisions or signing legal documents is necessary during the first 24 hours after surgery, have a trusted family member or spouse act on your behalf.  ? Avoid alcohol.  ? Have a responsible adult stay with you. He or she can watch for problems and help keep you safe.  Date Last Reviewed: 12/1/2016  © 9003-6861 [employer]. [address] [place] [address], [place] [address]. All rights reserved. This information is not intended as a substitute for professional medical care. Always follow your healthcare professional's instructions

## 2019-11-13 NOTE — TELEPHONE ENCOUNTER
----- Message from Chelsea Hauser RN sent at 11/13/2019  9:05 AM CST -----  Patient is scheduled for cervical spine fusion on 11/19 with Dr. Cruz ( approximately 150 minutes of general anesthesia). Patient will need medical clearance for this surgery. Patient was last seen by you on 11/7. May either clear by chart or make a new preop clearance appt. Thanks!

## 2019-11-13 NOTE — TELEPHONE ENCOUNTER
----- Message from Edward Hernandez MD sent at 11/13/2019 10:41 AM CST -----  Hi,    Mrs. Saxena is cleared for general anesthesia and surgery from my standpoint. Anything else you need officially from me? Let me know. Thanks.    Cortes Hernandez MD  ----- Message -----  From: Chelsea Hauser RN  Sent: 11/13/2019   9:05 AM CST  To: Chelsea Hauser RN, Edward Hernandez MD, #    Patient is scheduled for cervical spine fusion on 11/19 with Dr. Cruz ( approximately 150 minutes of general anesthesia). Patient will need medical clearance for this surgery. Patient was last seen by you on 11/7. May either clear by chart or make a new preop clearance appt. Thanks!

## 2019-11-13 NOTE — TELEPHONE ENCOUNTER
----- Message from Chelsea Hauser RN sent at 11/13/2019  9:04 AM CST -----  11/19 SURGERY  Please schedule poc appt, labs, and ekg.  Thanks!

## 2019-11-15 DIAGNOSIS — E11.9 TYPE 2 DIABETES MELLITUS WITHOUT COMPLICATION: ICD-10-CM

## 2019-11-15 DIAGNOSIS — E11.9 TYPE 2 DIABETES MELLITUS WITHOUT COMPLICATION, UNSPECIFIED WHETHER LONG TERM INSULIN USE: ICD-10-CM

## 2019-11-18 NOTE — PROGRESS NOTES
The patient presents for preop.  She has symptomatic cervical myelopathy and we are planning a C3-6 laminectomy and fusion.  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    I had a sit down discussion with the patient  regarding the planned procedure. We specifically discussed the risks, benefits, and alternatives to surgery. We discussed the surgical procedure including the skin incision, nerve decompression, bone fusion, allograft and/or iliac crest bone graft, and surgical implants including lateral mass screws and pedicle screws as indicated: they understand the risks include but are not limited to death, paralysis, blindness, bleeding, infection, damage to arteries, veins and nerves, vertebral artery injury, dysphagia, spinal fluid leak, continued or worsening pain, no improvement in symptoms, non-union, and the possible need for more surgery in the future, as well as the possibility other unforseen and unknown complications. We talked about expected hospital stay and recovery period. All questions were answered; they understand and wish to proceed    I spent 15 minutes with the patient of which greater than 1/2 the time was devoted to counciling the patient regarding treatment options.

## 2019-11-19 ENCOUNTER — HOSPITAL ENCOUNTER (INPATIENT)
Facility: HOSPITAL | Age: 59
LOS: 5 days | Discharge: NURSING, REHAB OR LONG-TERM CARE | DRG: 472 | End: 2019-11-26
Attending: ORTHOPAEDIC SURGERY | Admitting: ORTHOPAEDIC SURGERY
Payer: COMMERCIAL

## 2019-11-19 ENCOUNTER — ANESTHESIA (OUTPATIENT)
Dept: SURGERY | Facility: HOSPITAL | Age: 59
DRG: 472 | End: 2019-11-19
Payer: COMMERCIAL

## 2019-11-19 DIAGNOSIS — G95.9 CERVICAL MYELOPATHY: Primary | ICD-10-CM

## 2019-11-19 PROBLEM — E78.5 HYPERLIPIDEMIA: Status: ACTIVE | Noted: 2019-11-19

## 2019-11-19 LAB
ANION GAP SERPL CALC-SCNC: 8 MMOL/L (ref 8–16)
BASOPHILS # BLD AUTO: 0.04 K/UL (ref 0–0.2)
BASOPHILS NFR BLD: 0.3 % (ref 0–1.9)
BUN SERPL-MCNC: 17 MG/DL (ref 6–20)
CALCIUM SERPL-MCNC: 8.7 MG/DL (ref 8.7–10.5)
CHLORIDE SERPL-SCNC: 106 MMOL/L (ref 95–110)
CO2 SERPL-SCNC: 24 MMOL/L (ref 23–29)
CREAT SERPL-MCNC: 0.8 MG/DL (ref 0.5–1.4)
DIFFERENTIAL METHOD: ABNORMAL
EOSINOPHIL # BLD AUTO: 0.1 K/UL (ref 0–0.5)
EOSINOPHIL NFR BLD: 1 % (ref 0–8)
ERYTHROCYTE [DISTWIDTH] IN BLOOD BY AUTOMATED COUNT: 12.7 % (ref 11.5–14.5)
EST. GFR  (AFRICAN AMERICAN): >60 ML/MIN/1.73 M^2
EST. GFR  (NON AFRICAN AMERICAN): >60 ML/MIN/1.73 M^2
GLUCOSE SERPL-MCNC: 202 MG/DL (ref 70–110)
HCT VFR BLD AUTO: 33.1 % (ref 37–48.5)
HGB BLD-MCNC: 10.2 G/DL (ref 12–16)
IMM GRANULOCYTES # BLD AUTO: 0.05 K/UL (ref 0–0.04)
IMM GRANULOCYTES NFR BLD AUTO: 0.4 % (ref 0–0.5)
LYMPHOCYTES # BLD AUTO: 1.8 K/UL (ref 1–4.8)
LYMPHOCYTES NFR BLD: 13.1 % (ref 18–48)
MCH RBC QN AUTO: 27.9 PG (ref 27–31)
MCHC RBC AUTO-ENTMCNC: 30.8 G/DL (ref 32–36)
MCV RBC AUTO: 90 FL (ref 82–98)
MONOCYTES # BLD AUTO: 0.3 K/UL (ref 0.3–1)
MONOCYTES NFR BLD: 2.4 % (ref 4–15)
NEUTROPHILS # BLD AUTO: 11.1 K/UL (ref 1.8–7.7)
NEUTROPHILS NFR BLD: 82.8 % (ref 38–73)
NRBC BLD-RTO: 0 /100 WBC
PLATELET # BLD AUTO: 451 K/UL (ref 150–350)
PMV BLD AUTO: 10.1 FL (ref 9.2–12.9)
POCT GLUCOSE: 153 MG/DL (ref 70–110)
POCT GLUCOSE: 156 MG/DL (ref 70–110)
POCT GLUCOSE: 195 MG/DL (ref 70–110)
POCT GLUCOSE: 243 MG/DL (ref 70–110)
POTASSIUM SERPL-SCNC: 4 MMOL/L (ref 3.5–5.1)
RBC # BLD AUTO: 3.66 M/UL (ref 4–5.4)
SODIUM SERPL-SCNC: 138 MMOL/L (ref 136–145)
WBC # BLD AUTO: 13.42 K/UL (ref 3.9–12.7)

## 2019-11-19 PROCEDURE — D9220A PRA ANESTHESIA: Mod: CRNA,,, | Performed by: NURSE ANESTHETIST, CERTIFIED REGISTERED

## 2019-11-19 PROCEDURE — 22842 PR POSTERIOR SEGMENTAL INSTRUMENTATION 3-6 VRT SEG: ICD-10-PCS | Mod: ,,, | Performed by: ORTHOPAEDIC SURGERY

## 2019-11-19 PROCEDURE — 25000003 PHARM REV CODE 250: Performed by: STUDENT IN AN ORGANIZED HEALTH CARE EDUCATION/TRAINING PROGRAM

## 2019-11-19 PROCEDURE — 63600175 PHARM REV CODE 636 W HCPCS: Performed by: NURSE ANESTHETIST, CERTIFIED REGISTERED

## 2019-11-19 PROCEDURE — 25000003 PHARM REV CODE 250

## 2019-11-19 PROCEDURE — 63015 PR LAMINECTOMY,>2 SGMT,CERVICAL: ICD-10-PCS | Mod: ,,, | Performed by: ORTHOPAEDIC SURGERY

## 2019-11-19 PROCEDURE — 25000003 PHARM REV CODE 250: Performed by: ORTHOPAEDIC SURGERY

## 2019-11-19 PROCEDURE — 63600175 PHARM REV CODE 636 W HCPCS: Performed by: STUDENT IN AN ORGANIZED HEALTH CARE EDUCATION/TRAINING PROGRAM

## 2019-11-19 PROCEDURE — 22842 INSERT SPINE FIXATION DEVICE: CPT | Mod: ,,, | Performed by: ORTHOPAEDIC SURGERY

## 2019-11-19 PROCEDURE — G0378 HOSPITAL OBSERVATION PER HR: HCPCS

## 2019-11-19 PROCEDURE — 63600175 PHARM REV CODE 636 W HCPCS

## 2019-11-19 PROCEDURE — 22600 ARTHRD PST TQ 1NTRSPC CRV: CPT | Mod: 51,,, | Performed by: ORTHOPAEDIC SURGERY

## 2019-11-19 PROCEDURE — 20936 PR AUTOGRAFT SPINE SURGERY LOCAL FROM SAME INCISION: ICD-10-PCS | Mod: ,,, | Performed by: ORTHOPAEDIC SURGERY

## 2019-11-19 PROCEDURE — 96372 THER/PROPH/DIAG INJ SC/IM: CPT | Mod: 59

## 2019-11-19 PROCEDURE — 82962 GLUCOSE BLOOD TEST: CPT | Performed by: ORTHOPAEDIC SURGERY

## 2019-11-19 PROCEDURE — 22614 ARTHRD PST TQ 1NTRSPC EA ADD: CPT | Mod: ,,, | Performed by: ORTHOPAEDIC SURGERY

## 2019-11-19 PROCEDURE — 94761 N-INVAS EAR/PLS OXIMETRY MLT: CPT

## 2019-11-19 PROCEDURE — 63600175 PHARM REV CODE 636 W HCPCS: Performed by: ORTHOPAEDIC SURGERY

## 2019-11-19 PROCEDURE — 36620 INSERTION CATHETER ARTERY: CPT | Mod: 59,,, | Performed by: ANESTHESIOLOGY

## 2019-11-19 PROCEDURE — 85025 COMPLETE CBC W/AUTO DIFF WBC: CPT

## 2019-11-19 PROCEDURE — 80048 BASIC METABOLIC PNL TOTAL CA: CPT

## 2019-11-19 PROCEDURE — 22600 PR ARTHRODESIS, POST/POSTLAT, SNGL INTERSPACE, CERVICAL BELOW C2: ICD-10-PCS | Mod: 51,,, | Performed by: ORTHOPAEDIC SURGERY

## 2019-11-19 PROCEDURE — C1713 ANCHOR/SCREW BN/BN,TIS/BN: HCPCS | Performed by: ORTHOPAEDIC SURGERY

## 2019-11-19 PROCEDURE — 71000033 HC RECOVERY, INTIAL HOUR: Performed by: ORTHOPAEDIC SURGERY

## 2019-11-19 PROCEDURE — 22614 PR ARTHRODESIS, POST/POSTLAT, SNGL INTERSPACE, EA ADDTL: ICD-10-PCS | Mod: ,,, | Performed by: ORTHOPAEDIC SURGERY

## 2019-11-19 PROCEDURE — 37000009 HC ANESTHESIA EA ADD 15 MINS: Performed by: ORTHOPAEDIC SURGERY

## 2019-11-19 PROCEDURE — 37000008 HC ANESTHESIA 1ST 15 MINUTES: Performed by: ORTHOPAEDIC SURGERY

## 2019-11-19 PROCEDURE — 36000711: Performed by: ORTHOPAEDIC SURGERY

## 2019-11-19 PROCEDURE — D9220A PRA ANESTHESIA: ICD-10-PCS | Mod: CRNA,,, | Performed by: NURSE ANESTHETIST, CERTIFIED REGISTERED

## 2019-11-19 PROCEDURE — 36000710: Performed by: ORTHOPAEDIC SURGERY

## 2019-11-19 PROCEDURE — 63015 REMOVE SPINE LAMINA >2 CRVCL: CPT | Mod: ,,, | Performed by: ORTHOPAEDIC SURGERY

## 2019-11-19 PROCEDURE — D9220A PRA ANESTHESIA: ICD-10-PCS | Mod: ANES,,, | Performed by: ANESTHESIOLOGY

## 2019-11-19 PROCEDURE — D9220A PRA ANESTHESIA: Mod: ANES,,, | Performed by: ANESTHESIOLOGY

## 2019-11-19 PROCEDURE — 25000003 PHARM REV CODE 250: Performed by: NURSE ANESTHETIST, CERTIFIED REGISTERED

## 2019-11-19 PROCEDURE — 36620 PR INSERT CATH,ART,PERCUT,SHORTTERM: ICD-10-PCS | Mod: 59,,, | Performed by: ANESTHESIOLOGY

## 2019-11-19 PROCEDURE — 27201423 OPTIME MED/SURG SUP & DEVICES STERILE SUPPLY: Performed by: ORTHOPAEDIC SURGERY

## 2019-11-19 PROCEDURE — 20936 SP BONE AGRFT LOCAL ADD-ON: CPT | Mod: ,,, | Performed by: ORTHOPAEDIC SURGERY

## 2019-11-19 DEVICE — SET SCREW BONE SPINAL CROSS: Type: IMPLANTABLE DEVICE | Site: SPINE CERVICAL | Status: FUNCTIONAL

## 2019-11-19 DEVICE — CONNECTOR SPINAL OCT 28MM TI: Type: IMPLANTABLE DEVICE | Site: SPINE CERVICAL | Status: FUNCTIONAL

## 2019-11-19 DEVICE — SCREW BONE SPINAL 3.5 X 12MM: Type: IMPLANTABLE DEVICE | Site: SPINE CERVICAL | Status: FUNCTIONAL

## 2019-11-19 DEVICE — ROD SPINAL OCT 3.5 X 60MM: Type: IMPLANTABLE DEVICE | Site: SPINE CERVICAL | Status: FUNCTIONAL

## 2019-11-19 DEVICE — NUT SPINAL OUTER CONNECTOR: Type: IMPLANTABLE DEVICE | Site: SPINE CERVICAL | Status: FUNCTIONAL

## 2019-11-19 DEVICE — SET SCREW SPINAL INNER NUT: Type: IMPLANTABLE DEVICE | Site: SPINE CERVICAL | Status: FUNCTIONAL

## 2019-11-19 RX ORDER — OXYCODONE HYDROCHLORIDE 5 MG/1
5 TABLET ORAL EVERY 4 HOURS PRN
Status: DISCONTINUED | OUTPATIENT
Start: 2019-11-19 | End: 2019-11-26 | Stop reason: HOSPADM

## 2019-11-19 RX ORDER — AMLODIPINE BESYLATE 5 MG/1
5 TABLET ORAL DAILY
Status: DISCONTINUED | OUTPATIENT
Start: 2019-11-20 | End: 2019-11-26 | Stop reason: HOSPADM

## 2019-11-19 RX ORDER — OXYCODONE HYDROCHLORIDE 5 MG/1
5 TABLET ORAL EVERY 4 HOURS PRN
Qty: 20 TABLET | Refills: 0 | Status: ON HOLD | OUTPATIENT
Start: 2019-11-19 | End: 2019-12-03 | Stop reason: SDUPTHER

## 2019-11-19 RX ORDER — BUPIVACAINE HYDROCHLORIDE 2.5 MG/ML
INJECTION, SOLUTION EPIDURAL; INFILTRATION; INTRACAUDAL
Status: DISCONTINUED | OUTPATIENT
Start: 2019-11-19 | End: 2019-11-19 | Stop reason: HOSPADM

## 2019-11-19 RX ORDER — ONDANSETRON 4 MG/1
8 TABLET, FILM COATED ORAL EVERY 6 HOURS PRN
Status: DISCONTINUED | OUTPATIENT
Start: 2019-11-19 | End: 2019-11-26 | Stop reason: HOSPADM

## 2019-11-19 RX ORDER — CELECOXIB 200 MG/1
200 CAPSULE ORAL DAILY
Status: DISCONTINUED | OUTPATIENT
Start: 2019-11-19 | End: 2019-11-26 | Stop reason: HOSPADM

## 2019-11-19 RX ORDER — BACITRACIN 50000 [IU]/1
INJECTION, POWDER, FOR SOLUTION INTRAMUSCULAR
Status: DISCONTINUED | OUTPATIENT
Start: 2019-11-19 | End: 2019-11-19 | Stop reason: HOSPADM

## 2019-11-19 RX ORDER — ONDANSETRON 2 MG/ML
INJECTION INTRAMUSCULAR; INTRAVENOUS
Status: DISCONTINUED | OUTPATIENT
Start: 2019-11-19 | End: 2019-11-19

## 2019-11-19 RX ORDER — VANCOMYCIN HYDROCHLORIDE 1 G/20ML
INJECTION, POWDER, LYOPHILIZED, FOR SOLUTION INTRAVENOUS
Status: DISCONTINUED | OUTPATIENT
Start: 2019-11-19 | End: 2019-11-19 | Stop reason: HOSPADM

## 2019-11-19 RX ORDER — DEXTROMETHORPHAN HYDROBROMIDE, GUAIFENESIN 5; 100 MG/5ML; MG/5ML
650 LIQUID ORAL EVERY 8 HOURS
Qty: 42 TABLET | Refills: 0 | Status: SHIPPED | OUTPATIENT
Start: 2019-11-19 | End: 2020-01-14

## 2019-11-19 RX ORDER — LOSARTAN POTASSIUM 25 MG/1
25 TABLET ORAL DAILY
Status: DISCONTINUED | OUTPATIENT
Start: 2019-11-20 | End: 2019-11-26 | Stop reason: HOSPADM

## 2019-11-19 RX ORDER — SODIUM CHLORIDE 9 MG/ML
INJECTION, SOLUTION INTRAVENOUS CONTINUOUS PRN
Status: DISCONTINUED | OUTPATIENT
Start: 2019-11-19 | End: 2019-11-19

## 2019-11-19 RX ORDER — FENTANYL CITRATE 50 UG/ML
INJECTION, SOLUTION INTRAMUSCULAR; INTRAVENOUS
Status: DISCONTINUED | OUTPATIENT
Start: 2019-11-19 | End: 2019-11-19

## 2019-11-19 RX ORDER — HYDROMORPHONE HYDROCHLORIDE 1 MG/ML
0.5 INJECTION, SOLUTION INTRAMUSCULAR; INTRAVENOUS; SUBCUTANEOUS
Status: DISCONTINUED | OUTPATIENT
Start: 2019-11-19 | End: 2019-11-26 | Stop reason: HOSPADM

## 2019-11-19 RX ORDER — OXYCODONE HYDROCHLORIDE 5 MG/1
TABLET ORAL
Status: COMPLETED
Start: 2019-11-19 | End: 2019-11-19

## 2019-11-19 RX ORDER — ATORVASTATIN CALCIUM 10 MG/1
10 TABLET, FILM COATED ORAL DAILY
Status: DISCONTINUED | OUTPATIENT
Start: 2019-11-19 | End: 2019-11-26 | Stop reason: HOSPADM

## 2019-11-19 RX ORDER — PROPOFOL 10 MG/ML
VIAL (ML) INTRAVENOUS CONTINUOUS PRN
Status: DISCONTINUED | OUTPATIENT
Start: 2019-11-19 | End: 2019-11-19

## 2019-11-19 RX ORDER — FENTANYL CITRATE 50 UG/ML
25 INJECTION, SOLUTION INTRAMUSCULAR; INTRAVENOUS EVERY 5 MIN PRN
Status: COMPLETED | OUTPATIENT
Start: 2019-11-19 | End: 2019-11-19

## 2019-11-19 RX ORDER — TALC
6 POWDER (GRAM) TOPICAL NIGHTLY PRN
Status: DISCONTINUED | OUTPATIENT
Start: 2019-11-19 | End: 2019-11-26 | Stop reason: HOSPADM

## 2019-11-19 RX ORDER — DOCUSATE SODIUM 100 MG/1
100 CAPSULE, LIQUID FILLED ORAL 2 TIMES DAILY
Status: DISCONTINUED | OUTPATIENT
Start: 2019-11-19 | End: 2019-11-26 | Stop reason: HOSPADM

## 2019-11-19 RX ORDER — GLYCOPYRROLATE 0.2 MG/ML
INJECTION INTRAMUSCULAR; INTRAVENOUS
Status: DISCONTINUED | OUTPATIENT
Start: 2019-11-19 | End: 2019-11-19

## 2019-11-19 RX ORDER — IBUPROFEN 200 MG
24 TABLET ORAL
Status: DISCONTINUED | OUTPATIENT
Start: 2019-11-19 | End: 2019-11-26 | Stop reason: HOSPADM

## 2019-11-19 RX ORDER — ONDANSETRON 2 MG/ML
4 INJECTION INTRAMUSCULAR; INTRAVENOUS DAILY PRN
Status: DISCONTINUED | OUTPATIENT
Start: 2019-11-19 | End: 2019-11-19 | Stop reason: HOSPADM

## 2019-11-19 RX ORDER — CALCIUM CARBONATE 200(500)MG
500 TABLET,CHEWABLE ORAL 3 TIMES DAILY PRN
Status: DISCONTINUED | OUTPATIENT
Start: 2019-11-19 | End: 2019-11-26 | Stop reason: HOSPADM

## 2019-11-19 RX ORDER — MIDAZOLAM HYDROCHLORIDE 1 MG/ML
INJECTION, SOLUTION INTRAMUSCULAR; INTRAVENOUS
Status: DISCONTINUED | OUTPATIENT
Start: 2019-11-19 | End: 2019-11-19

## 2019-11-19 RX ORDER — PROMETHAZINE HYDROCHLORIDE 12.5 MG/1
12.5 TABLET ORAL EVERY 6 HOURS PRN
Status: DISCONTINUED | OUTPATIENT
Start: 2019-11-19 | End: 2019-11-26 | Stop reason: HOSPADM

## 2019-11-19 RX ORDER — HEPARIN SODIUM 5000 [USP'U]/ML
5000 INJECTION, SOLUTION INTRAVENOUS; SUBCUTANEOUS EVERY 8 HOURS
Status: DISCONTINUED | OUTPATIENT
Start: 2019-11-20 | End: 2019-11-26 | Stop reason: HOSPADM

## 2019-11-19 RX ORDER — SODIUM CHLORIDE 0.9 % (FLUSH) 0.9 %
10 SYRINGE (ML) INJECTION
Status: DISCONTINUED | OUTPATIENT
Start: 2019-11-19 | End: 2019-11-19 | Stop reason: HOSPADM

## 2019-11-19 RX ORDER — ROCURONIUM BROMIDE 10 MG/ML
INJECTION, SOLUTION INTRAVENOUS
Status: DISCONTINUED | OUTPATIENT
Start: 2019-11-19 | End: 2019-11-19

## 2019-11-19 RX ORDER — INSULIN ASPART 100 [IU]/ML
1-10 INJECTION, SOLUTION INTRAVENOUS; SUBCUTANEOUS
Status: DISCONTINUED | OUTPATIENT
Start: 2019-11-19 | End: 2019-11-26 | Stop reason: HOSPADM

## 2019-11-19 RX ORDER — HYDROMORPHONE HYDROCHLORIDE 1 MG/ML
0.2 INJECTION, SOLUTION INTRAMUSCULAR; INTRAVENOUS; SUBCUTANEOUS
Status: DISCONTINUED | OUTPATIENT
Start: 2019-11-19 | End: 2019-11-26 | Stop reason: HOSPADM

## 2019-11-19 RX ORDER — METHOCARBAMOL 750 MG/1
750 TABLET, FILM COATED ORAL 3 TIMES DAILY
Status: DISCONTINUED | OUTPATIENT
Start: 2019-11-19 | End: 2019-11-26 | Stop reason: HOSPADM

## 2019-11-19 RX ORDER — GABAPENTIN 300 MG/1
300 CAPSULE ORAL 3 TIMES DAILY
Status: DISCONTINUED | OUTPATIENT
Start: 2019-11-19 | End: 2019-11-26 | Stop reason: HOSPADM

## 2019-11-19 RX ORDER — OXYCODONE HYDROCHLORIDE 10 MG/1
10 TABLET ORAL EVERY 4 HOURS PRN
Status: DISCONTINUED | OUTPATIENT
Start: 2019-11-19 | End: 2019-11-26 | Stop reason: HOSPADM

## 2019-11-19 RX ORDER — GABAPENTIN 100 MG/1
100 CAPSULE ORAL 3 TIMES DAILY
Qty: 42 CAPSULE | Refills: 0 | Status: ON HOLD | OUTPATIENT
Start: 2019-11-19 | End: 2019-12-03 | Stop reason: SDUPTHER

## 2019-11-19 RX ORDER — DIPHENHYDRAMINE HCL 25 MG
25 CAPSULE ORAL EVERY 6 HOURS PRN
Status: DISCONTINUED | OUTPATIENT
Start: 2019-11-19 | End: 2019-11-26 | Stop reason: HOSPADM

## 2019-11-19 RX ORDER — ACETAMINOPHEN 500 MG
500 TABLET ORAL EVERY 8 HOURS
Status: DISCONTINUED | OUTPATIENT
Start: 2019-11-19 | End: 2019-11-26 | Stop reason: HOSPADM

## 2019-11-19 RX ORDER — SODIUM CHLORIDE 9 MG/ML
INJECTION, SOLUTION INTRAVENOUS CONTINUOUS
Status: DISCONTINUED | OUTPATIENT
Start: 2019-11-19 | End: 2019-11-19

## 2019-11-19 RX ORDER — LIDOCAINE HYDROCHLORIDE AND EPINEPHRINE 10; 10 MG/ML; UG/ML
INJECTION, SOLUTION INFILTRATION; PERINEURAL
Status: DISCONTINUED | OUTPATIENT
Start: 2019-11-19 | End: 2019-11-19 | Stop reason: HOSPADM

## 2019-11-19 RX ORDER — SUCCINYLCHOLINE CHLORIDE 20 MG/ML
INJECTION INTRAMUSCULAR; INTRAVENOUS
Status: DISCONTINUED | OUTPATIENT
Start: 2019-11-19 | End: 2019-11-19

## 2019-11-19 RX ORDER — CEFAZOLIN SODIUM 1 G/3ML
2 INJECTION, POWDER, FOR SOLUTION INTRAMUSCULAR; INTRAVENOUS
Status: COMPLETED | OUTPATIENT
Start: 2019-11-19 | End: 2019-11-20

## 2019-11-19 RX ORDER — NEOSTIGMINE METHYLSULFATE 1 MG/ML
INJECTION, SOLUTION INTRAVENOUS
Status: DISCONTINUED | OUTPATIENT
Start: 2019-11-19 | End: 2019-11-19

## 2019-11-19 RX ORDER — LEVOTHYROXINE SODIUM 50 UG/1
50 TABLET ORAL DAILY
Status: DISCONTINUED | OUTPATIENT
Start: 2019-11-20 | End: 2019-11-26 | Stop reason: HOSPADM

## 2019-11-19 RX ORDER — KETAMINE HCL IN 0.9 % NACL 50 MG/5 ML
SYRINGE (ML) INTRAVENOUS
Status: DISCONTINUED | OUTPATIENT
Start: 2019-11-19 | End: 2019-11-19

## 2019-11-19 RX ORDER — MUPIROCIN 20 MG/G
OINTMENT TOPICAL
Status: DISCONTINUED | OUTPATIENT
Start: 2019-11-19 | End: 2019-11-19 | Stop reason: HOSPADM

## 2019-11-19 RX ORDER — SODIUM CHLORIDE 9 MG/ML
INJECTION, SOLUTION INTRAVENOUS CONTINUOUS
Status: ACTIVE | OUTPATIENT
Start: 2019-11-19 | End: 2019-11-19

## 2019-11-19 RX ORDER — PROPOFOL 10 MG/ML
VIAL (ML) INTRAVENOUS
Status: DISCONTINUED | OUTPATIENT
Start: 2019-11-19 | End: 2019-11-19

## 2019-11-19 RX ORDER — POLYETHYLENE GLYCOL 3350 17 G/17G
17 POWDER, FOR SOLUTION ORAL 2 TIMES DAILY PRN
Status: DISCONTINUED | OUTPATIENT
Start: 2019-11-19 | End: 2019-11-26 | Stop reason: HOSPADM

## 2019-11-19 RX ORDER — GLUCAGON 1 MG
1 KIT INJECTION
Status: DISCONTINUED | OUTPATIENT
Start: 2019-11-19 | End: 2019-11-26 | Stop reason: HOSPADM

## 2019-11-19 RX ORDER — LIDOCAINE HCL/PF 100 MG/5ML
SYRINGE (ML) INTRAVENOUS
Status: DISCONTINUED | OUTPATIENT
Start: 2019-11-19 | End: 2019-11-19

## 2019-11-19 RX ORDER — DEXAMETHASONE SODIUM PHOSPHATE 4 MG/ML
INJECTION, SOLUTION INTRA-ARTICULAR; INTRALESIONAL; INTRAMUSCULAR; INTRAVENOUS; SOFT TISSUE
Status: DISCONTINUED | OUTPATIENT
Start: 2019-11-19 | End: 2019-11-19

## 2019-11-19 RX ORDER — CEFAZOLIN SODIUM 1 G/3ML
2 INJECTION, POWDER, FOR SOLUTION INTRAMUSCULAR; INTRAVENOUS
Status: COMPLETED | OUTPATIENT
Start: 2019-11-19 | End: 2019-11-19

## 2019-11-19 RX ORDER — PANTOPRAZOLE SODIUM 40 MG/1
40 TABLET, DELAYED RELEASE ORAL DAILY
Status: DISCONTINUED | OUTPATIENT
Start: 2019-11-19 | End: 2019-11-26 | Stop reason: HOSPADM

## 2019-11-19 RX ORDER — METHOCARBAMOL 500 MG/1
1000 TABLET, FILM COATED ORAL 3 TIMES DAILY
Qty: 84 TABLET | Refills: 0 | Status: ON HOLD | OUTPATIENT
Start: 2019-11-19 | End: 2019-12-03 | Stop reason: SDUPTHER

## 2019-11-19 RX ORDER — FENTANYL CITRATE 50 UG/ML
INJECTION, SOLUTION INTRAMUSCULAR; INTRAVENOUS
Status: COMPLETED
Start: 2019-11-19 | End: 2019-11-19

## 2019-11-19 RX ORDER — IBUPROFEN 200 MG
16 TABLET ORAL
Status: DISCONTINUED | OUTPATIENT
Start: 2019-11-19 | End: 2019-11-26 | Stop reason: HOSPADM

## 2019-11-19 RX ORDER — ACETAMINOPHEN 10 MG/ML
INJECTION, SOLUTION INTRAVENOUS
Status: DISCONTINUED | OUTPATIENT
Start: 2019-11-19 | End: 2019-11-19

## 2019-11-19 RX ORDER — CELECOXIB 200 MG/1
200 CAPSULE ORAL DAILY
Qty: 14 CAPSULE | Refills: 0 | Status: ON HOLD | OUTPATIENT
Start: 2019-11-19 | End: 2019-12-03 | Stop reason: SDUPTHER

## 2019-11-19 RX ADMIN — ATORVASTATIN CALCIUM 10 MG: 10 TABLET, FILM COATED ORAL at 10:11

## 2019-11-19 RX ADMIN — METHOCARBAMOL TABLETS 750 MG: 750 TABLET, COATED ORAL at 09:11

## 2019-11-19 RX ADMIN — FENTANYL CITRATE 25 MCG: 50 INJECTION INTRAMUSCULAR; INTRAVENOUS at 10:11

## 2019-11-19 RX ADMIN — Medication 10 MG: at 08:11

## 2019-11-19 RX ADMIN — DEXAMETHASONE SODIUM PHOSPHATE 4 MG: 4 INJECTION, SOLUTION INTRAMUSCULAR; INTRAVENOUS at 07:11

## 2019-11-19 RX ADMIN — GLYCOPYRROLATE 0.4 MG: 0.2 INJECTION, SOLUTION INTRAMUSCULAR; INTRAVENOUS at 09:11

## 2019-11-19 RX ADMIN — DOCUSATE SODIUM 100 MG: 100 CAPSULE, LIQUID FILLED ORAL at 09:11

## 2019-11-19 RX ADMIN — PROPOFOL 150 MCG/KG/MIN: 10 INJECTION, EMULSION INTRAVENOUS at 07:11

## 2019-11-19 RX ADMIN — DOCUSATE SODIUM 100 MG: 100 CAPSULE, LIQUID FILLED ORAL at 10:11

## 2019-11-19 RX ADMIN — PROPOFOL 50 MG: 10 INJECTION, EMULSION INTRAVENOUS at 07:11

## 2019-11-19 RX ADMIN — MIDAZOLAM HYDROCHLORIDE 2 MG: 1 INJECTION, SOLUTION INTRAMUSCULAR; INTRAVENOUS at 07:11

## 2019-11-19 RX ADMIN — METHOCARBAMOL TABLETS 750 MG: 750 TABLET, COATED ORAL at 10:11

## 2019-11-19 RX ADMIN — FENTANYL CITRATE 100 MCG: 50 INJECTION, SOLUTION INTRAMUSCULAR; INTRAVENOUS at 07:11

## 2019-11-19 RX ADMIN — SODIUM CHLORIDE, SODIUM GLUCONATE, SODIUM ACETATE, POTASSIUM CHLORIDE, MAGNESIUM CHLORIDE, SODIUM PHOSPHATE, DIBASIC, AND POTASSIUM PHOSPHATE: .53; .5; .37; .037; .03; .012; .00082 INJECTION, SOLUTION INTRAVENOUS at 08:11

## 2019-11-19 RX ADMIN — MUPIROCIN: 20 OINTMENT TOPICAL at 06:11

## 2019-11-19 RX ADMIN — ACETAMINOPHEN 1000 MG: 10 INJECTION, SOLUTION INTRAVENOUS at 07:11

## 2019-11-19 RX ADMIN — FENTANYL CITRATE 25 MCG: 50 INJECTION INTRAMUSCULAR; INTRAVENOUS at 09:11

## 2019-11-19 RX ADMIN — ACETAMINOPHEN 500 MG: 500 TABLET ORAL at 03:11

## 2019-11-19 RX ADMIN — REMIFENTANIL HYDROCHLORIDE 0.1 MCG/KG/MIN: 1 INJECTION, POWDER, LYOPHILIZED, FOR SOLUTION INTRAVENOUS at 07:11

## 2019-11-19 RX ADMIN — CEFAZOLIN 2 G: 1 INJECTION, POWDER, FOR SOLUTION INTRAMUSCULAR; INTRAVENOUS at 03:11

## 2019-11-19 RX ADMIN — SODIUM CHLORIDE: 0.9 INJECTION, SOLUTION INTRAVENOUS at 10:11

## 2019-11-19 RX ADMIN — OXYCODONE HYDROCHLORIDE 10 MG: 5 TABLET ORAL at 10:11

## 2019-11-19 RX ADMIN — CEFAZOLIN 2 G: 330 INJECTION, POWDER, FOR SOLUTION INTRAMUSCULAR; INTRAVENOUS at 07:11

## 2019-11-19 RX ADMIN — SODIUM CHLORIDE 1000 ML: 0.9 INJECTION, SOLUTION INTRAVENOUS at 06:11

## 2019-11-19 RX ADMIN — ONDANSETRON 4 MG: 2 INJECTION INTRAMUSCULAR; INTRAVENOUS at 07:11

## 2019-11-19 RX ADMIN — NEOSTIGMINE METHYLSULFATE 4 MG: 1 INJECTION INTRAVENOUS at 09:11

## 2019-11-19 RX ADMIN — SODIUM CHLORIDE: 0.9 INJECTION, SOLUTION INTRAVENOUS at 07:11

## 2019-11-19 RX ADMIN — PANTOPRAZOLE SODIUM 40 MG: 40 TABLET, DELAYED RELEASE ORAL at 10:11

## 2019-11-19 RX ADMIN — LIDOCAINE HYDROCHLORIDE 100 MG: 20 INJECTION, SOLUTION INTRAVENOUS at 07:11

## 2019-11-19 RX ADMIN — OXYCODONE HYDROCHLORIDE 10 MG: 10 TABLET ORAL at 10:11

## 2019-11-19 RX ADMIN — PROPOFOL 150 MG: 10 INJECTION, EMULSION INTRAVENOUS at 07:11

## 2019-11-19 RX ADMIN — SUCCINYLCHOLINE CHLORIDE 140 MG: 20 INJECTION, SOLUTION INTRAMUSCULAR; INTRAVENOUS at 07:11

## 2019-11-19 RX ADMIN — PROPOFOL: 10 INJECTION, EMULSION INTRAVENOUS at 08:11

## 2019-11-19 RX ADMIN — ROCURONIUM BROMIDE 5 MG: 10 INJECTION, SOLUTION INTRAVENOUS at 07:11

## 2019-11-19 RX ADMIN — INSULIN ASPART 4 UNITS: 100 INJECTION, SOLUTION INTRAVENOUS; SUBCUTANEOUS at 05:11

## 2019-11-19 RX ADMIN — CELECOXIB 200 MG: 200 CAPSULE ORAL at 10:11

## 2019-11-19 RX ADMIN — GABAPENTIN 300 MG: 300 CAPSULE ORAL at 09:11

## 2019-11-19 RX ADMIN — ACETAMINOPHEN 500 MG: 500 TABLET ORAL at 09:11

## 2019-11-19 RX ADMIN — GABAPENTIN 300 MG: 300 CAPSULE ORAL at 10:11

## 2019-11-19 RX ADMIN — CEFAZOLIN 2 G: 1 INJECTION, POWDER, FOR SOLUTION INTRAMUSCULAR; INTRAVENOUS at 11:11

## 2019-11-19 RX ADMIN — GABAPENTIN 300 MG: 300 CAPSULE ORAL at 03:11

## 2019-11-19 RX ADMIN — Medication 30 MG: at 07:11

## 2019-11-19 RX ADMIN — GLYCOPYRROLATE 0.2 MG: 0.2 INJECTION, SOLUTION INTRAMUSCULAR; INTRAVENOUS at 07:11

## 2019-11-19 NOTE — PLAN OF CARE
Patient resting in bed comfortably. Iv intact and infusing free of infection and irration, fall precautions maintained no falls noted. Call light in reach bed locked and in lowest position. Non skid socks on while out of bed. Patient instructed to call for assistance. Skin integrity maintained as patient is independent with frequent positioning, C/o pain managed with PRN meds, No other complaints or concerns. Ambulation promoted assist x1 with walker,  Will continue to monitor and follow careplan of care.

## 2019-11-19 NOTE — NURSING TRANSFER
Nursing Transfer Note      11/19/2019     Transfer To: [id number]    Transfer via stretcher    Transfer with IV pump, neck brace in place    Transported by PCT    Medicines sent: none    Chart send with patient: Yes    Notified: family via surgical texting system    Patient reassessed at: 11/19/2019 12:45 PM

## 2019-11-19 NOTE — TRANSFER OF CARE
"Anesthesia Transfer of Care Note    Patient: Lala Saxena    Procedure(s) Performed: Procedure(s) (LRB):  FUSION, SPINE, CERVICAL C3-6 (N/A)    Patient location: PACU    Transport from OR: Transported from OR on 6-10 L/min O2 by face mask with adequate spontaneous ventilation    Post pain: adequate analgesia    Post assessment: no apparent anesthetic complications    Post vital signs: stable    Level of consciousness: sedated    Nausea/Vomiting: no nausea/vomiting    Complications: none    Transfer of care protocol was followed      Last vitals:   Visit Vitals  BP (!) 158/74   Pulse 83   Temp 35.7 °C (96.3 °F) (Temporal)   Resp 20   Ht 5' 4" (1.626 m)   Wt 78.9 kg (174 lb)   SpO2 100%   Breastfeeding? No   BMI 29.87 kg/m²     "

## 2019-11-19 NOTE — PLAN OF CARE
[facility]    HOME HEALTH ORDERS  FACE TO FACE ENCOUNTER    Patient Name: Lala Saxena  YOB: 1960    PCP: Edward Hernandez MD   PCP Address: [address] [id number]  PCP Phone Number: [phone]  PCP Fax: [phone]    Encounter Date: 11/19/2019    Admit to Home Health    Diagnoses:  Active Hospital Problems    Diagnosis  POA    *Cervical myelopathy [G95.9]  Yes    Hyperlipidemia [E78.5]  Yes    Type 2 diabetes mellitus, without long-term current use of insulin [E11.9]  Yes    Essential hypertension [I10]  Yes      Resolved Hospital Problems   No resolved problems to display.       Future Appointments   Date Time Provider Department Center   12/5/2019 11:00 AM Edward Hernandez MD [facility]   12/10/2019  9:15 AM Quyen Ramírez PA-C [facility] SPINE [facility]   12/23/2019  7:20 AM Janette Church MD [facility]     Follow-up Information     Quyen Ramírez PA-C. Go on 12/10/2019.    Specialty:  Orthopedic Surgery  Why:  For wound re-check; 9:15 am  Contact information:  [address] [facility] [address]  [phone]                     I have seen and examined this patient face to face today. My clinical findings that support the need for the home health skilled services and home bound status are the following:  Weakness/numbness causing balance and gait disturbance due to Surgery making it taxing to leave home.    Allergies:Review of patient's allergies indicates:  No Known Allergies    Diet: diabetic diet: 2000 calorie    Activities: activity as tolerated in cervical collar    Nursing:   SN to complete comprehensive assessment including routine vital signs. Instruct on disease process and s/s of complications to report to MD. Follow specific home health arthoplasty protocol. Review/verify medication list sent home with the patient at time of discharge  and instruct patient/caregiver as needed.     Notify MD if SBP > 160  or < 90; DBP > 90 or < 50; HR > 120 or < 50; Temp > 101    Home Medical Equipment:  Walker, 3-1 bedside commode, transfer tub bench    CONSULTS:    Physical Therapy to evaluate and treat. Evaluate for home safety and equipment needs; Establish/upgrade home exercise program. Perform / instruct on therapeutic exercises, gait training, transfer training, and Range of Motion.      WOUND CARE ORDERS  DR. YASMINE DOLAN POSTOPERATIVE INSTRUCTIONS -   POSTERIOR CERVICAL LAMINECTOMY AND FUSION       Antibiotics: You do not need additional antibiotics at home.    NSAIDs: Please refrain from taking ibuprofen (Advil), naproxen (Aleve), and other non steroidal anti-inflammatory medications as they may inhibit bone healing in the postoperative period.    Wound Care: You may remove your dressing and shower 7 days after surgery. Until then please keep your wound clean and dry. Sponge baths are acceptable. Do not go in a pool or hot tub until seen in clinic. Please leave the small steri-strips covering your wound in place until they fall off naturally (2 weeks). You may notice clear suture ends hanging from the sides of your incision after the steri-strips are removed, it is ok to clip these with scissors.    Cervical Collar: If given a collar after surgery you should wear it at all times. The only times it may be removed are for sleeping, eating and showering.    Pain: You will be given a prescription for pain medicine before discharge. If your pain is not adequately controlled with these medications, please call [phone]. Your pain medicine will be gradually decreased over the following 8 weeks.  Per our department policy, we will only provide pain medication for 2 months after your date of surgery.  If you require additional pain medication after this date, you will need to schedule an appointment with pain management or your PCP to provide future prescriptions.  A copy of the medication policy is attached.       Difficulty Breathing: This is uncommon after surgery and may represent dangerous swelling in your neck. If you experience difficulty breathing please call 911 immediately.    Infection: Signs of infection include increasing wound drainage and redness around the wound, as well as a temperature over 101.5 degrees. It is unnecessary to take your temperature on a routine basis. Please call the above number if you are concerned about an infection.    Driving and Work: It is ok to return to driving and work as long as you are not taking narcotic pain medications or wearing your cervical collar. Please do not lift over 5 pounds or participate in exercise or sports until cleared by Dr. Cruz.    Deep Venous Thrombosis (Blood Clots): Symptoms include swelling in the legs and shortness of breath. Please call the office or proceed to the nearest emergency room if you have any of these symptoms.    Physical Therapy: The best physical therapy immediately after surgery is walking. Please try to walk as much as possible.    Follow-up: You will be scheduled for a follow-up appointment in 2-3 weeks with either Dr. Cruz or his physician assistant, Quyen Ramírez PA-C.    Questions: During business hours please call [phone] or [phone] for routine questions. For after hours questions please call [phone] and ask to speak with the Orthopaedic resident on call.    Disability: If you submitted short term disability paperwork for us to complete and would like to check the status, please call the Disability Department at [phone].  You may fax any necessary paperwork to [phone].             Medications: Review discharge medications with patient and family and provide education.      Current Discharge Medication List      START taking these medications    Details   acetaminophen (TYLENOL) 650 MG TbSR Take 1 tablet (650 mg total) by mouth every 8 (eight) hours.  Qty: 42 tablet, Refills: 0     Associated Diagnoses: Cervical myelopathy      celecoxib (CELEBREX) 200 MG capsule Take 1 capsule (200 mg total) by mouth once daily.  Qty: 14 capsule, Refills: 0    Associated Diagnoses: Cervical myelopathy      gabapentin (NEURONTIN) 100 MG capsule Take 1 capsule (100 mg total) by mouth 3 (three) times daily.  Qty: 42 capsule, Refills: 0    Associated Diagnoses: Cervical myelopathy      methocarbamol (ROBAXIN) 500 MG Tab Take 2 tablets (1,000 mg total) by mouth 3 (three) times daily. for 15 days  Qty: 84 tablet, Refills: 0    Associated Diagnoses: Cervical myelopathy      oxyCODONE (ROXICODONE) 5 MG immediate release tablet Take 1 tablet (5 mg total) by mouth every 4 (four) hours as needed for Pain.  Qty: 20 tablet, Refills: 0    Associated Diagnoses: Cervical myelopathy         CONTINUE these medications which have NOT CHANGED    Details   amLODIPine (NORVASC) 5 MG tablet Take 5 mg by mouth.      atorvastatin (LIPITOR) 10 MG tablet Take 10 mg by mouth once daily.      glimepiride (AMARYL) 4 MG tablet Take 4 mg by mouth daily with breakfast.       levothyroxine (SYNTHROID) 50 MCG tablet Take 50 mcg by mouth once daily.      losartan (COZAAR) 25 MG tablet Take 30 mg by mouth once daily.      metFORMIN (GLUCOPHAGE) 1000 MG tablet Take 1,000 mg by mouth 2 (two) times daily with meals.          STOP taking these medications       methylPREDNISolone (MEDROL DOSEPACK) 4 mg tablet Comments:   Reason for Stopping:               I certify that this patient is confined to her home and needs intermittent skilled nursing care and physical therapy.

## 2019-11-19 NOTE — OP NOTE
DATE OF PROCEDURE: 11/19/2019     SURGEON: Miguel Cruz M.D.     ASSISTANT-SURGEON: Derek Lopez M.D. PGY-4     PREOPERATIVE DIAGNOSIS:     1:  Cervical Spondylitic Myelopathy with Acute Progression     POSTOPERATIVE DIAGNOSIS:    1:  Cervical Spondylitic Myelopathywith Acute Progression     PROCEDURES PERFORMED:  1.  Posterior cervical spinal fusion C3-C6  2.  C3-C6 laminectomy for decompression of spinal stenosis  3.  Posterior segmental instrumentation C3-6  4.  Cadaveric and local bone grafting.     ANESTHESIA: General endotracheal anesthesia.     ESTIMATED BLOOD LOSS:  100  mL.     IMPLANTS:  Depuy Mountaineer     SPECIMENS: None.     FINDINGS:  None.     DRAINS: One deep.     COMPLICATIONS: None.     SPONGE AND NEEDLE COUNT: Correct x2.     NEUROLOGICAL MONITORING: Motor evoked potentials, somatosensory evoked    potential, and free-running EMG.  Consistent with her significant myelopathy the patient had absent bilateral lower extremity MEPs throughout the entire procedure. SSEPs were unreliable. There were no changes to stable and reliable bilateral Upper Extremity motors     DESCRIPTION INFORMED CONSENT:    Please see my last clinic note for full description of the informed consent process I had with the patient.      REASON FOR OPERATION AND BRIEF HISTORY AND PHYSICAL: The patient is a 59year-old female with symptomatic cervical stenosis. She is here for surgery today.     DESCRIPTION OF PROCEDURE: The patient was met in the preoperative area where    her posterior cervical spine was marked as the operative site. Subsequently, they were    brought to the Operating Room where they was placed under general endotracheal    anesthesia. Sequential compression devices were placed in the patient's    bilateral calves and run throughout the case. A Farmer catheter was then sterilely placed. Lombardi-Wells tongs were then placed in the standard sterile fashion and the patient was positioned prone on a Obi  table with 4 post pads.  The head was secured to 15 lb of in-line traction.  The arms were padded talked.  The shoulders were gently taped down.  The posterior cervical spine was then prepped and draped in a normal sterile fashion     A full timeout was then called, identifying the patient, the procedural site and  levels, the availability of all instruments and implants, and no specific    nursing, surgical, anesthetic, or neurological monitoring concerns. Finally, it  was safe to proceed with surgery and the patient was given weight-appropriate    dose of Ancef by the Anesthesia Service.     I then infiltrated my planned incision with 10 mL of 0.5% Marcaine with    epinephrine.     We then made a midline posterior cervical incision and performed a preliminary subperiosteal exposure with a took to be the C3-C6 lamina transverse processes and relevant intervening facet joints. At the conclusion of my preliminary exposure we placed an elevator and wanted took to be the right C3-4 facet joint took a lateral radiograph and  I confirmed the levels radiographically. Next we prepared lateral mass screw tracts in the standard fashion from C3-C6 bilaterally but did not place screws to facilitate laminectomy.       We then began the neurological decompression portion of the procedure. A high-speed drill was used to perform a trough style laminectomy from C3-C6 bilaterally.  These laminar fragments were then removed on block.  Epidural hemostasis was achieved with a combination of bipolar electrocautery as well as FloSeal and patties.  Wound was thoroughly irrigated. Screws were placed in the previously prepared tracts from C3-C6.  AP and lateral radiographs then taken demonstrating correct level as well as adequate position of all implants.  All bony surfaces were then decorticated from C2-C6.  Rods were then measured cut contoured and locked into place with many fracture provided set plugs and torque wrench.  A cross-link  was placed at the C4 level. A deep drain was then placed.  The patient's local bone graft was milled in a bone mill and mixed with 1 g of vancomycin powder and laid dorsally along the decorticated surfaces from C3-C6 bilaterally.     At this point the wound was closed in layers using 0 Ethibond for the fascia, 2-0 Vicryl for the dermis and 3-0 Monocryl for the skin.     The patient was then flipped supine the Lombardi-Wells tongs removed she was extubated and transferred to the recovery room in stable condition.      itudinal ligament

## 2019-11-19 NOTE — ANESTHESIA PROCEDURE NOTES
Arterial    Diagnosis: cervical myelopathy    Patient location during procedure: done in OR  Procedure start time: 11/19/2019 7:20 AM  Timeout: 11/19/2019 7:19 AM  Procedure end time: 11/19/2019 7:21 AM    Staffing  Authorizing Provider: Gordon Maguire MD  Performing Provider: Petr Live CRNA      Preanesthetic Checklist  Completed: patient identified, surgical consent, pre-op evaluation and anesthesia consent givenArterial  Skin Prep: chlorhexidine gluconate and isopropyl alcohol  Local Infiltration: none  Orientation: right  Location: radial  Catheter Size: 20 GInsertion Attempts: 1  Assessment  Dressing: secured with tape and tegaderm  Patient: Tolerated well

## 2019-11-20 LAB
ANION GAP SERPL CALC-SCNC: 7 MMOL/L (ref 8–16)
BASOPHILS # BLD AUTO: 0.04 K/UL (ref 0–0.2)
BASOPHILS NFR BLD: 0.2 % (ref 0–1.9)
BUN SERPL-MCNC: 13 MG/DL (ref 6–20)
CALCIUM SERPL-MCNC: 8.3 MG/DL (ref 8.7–10.5)
CHLORIDE SERPL-SCNC: 102 MMOL/L (ref 95–110)
CO2 SERPL-SCNC: 21 MMOL/L (ref 23–29)
CREAT SERPL-MCNC: 0.7 MG/DL (ref 0.5–1.4)
DIFFERENTIAL METHOD: ABNORMAL
EOSINOPHIL # BLD AUTO: 0.1 K/UL (ref 0–0.5)
EOSINOPHIL NFR BLD: 0.3 % (ref 0–8)
ERYTHROCYTE [DISTWIDTH] IN BLOOD BY AUTOMATED COUNT: 12.6 % (ref 11.5–14.5)
EST. GFR  (AFRICAN AMERICAN): >60 ML/MIN/1.73 M^2
EST. GFR  (NON AFRICAN AMERICAN): >60 ML/MIN/1.73 M^2
GLUCOSE SERPL-MCNC: 126 MG/DL (ref 70–110)
HCT VFR BLD AUTO: 29 % (ref 37–48.5)
HGB BLD-MCNC: 9 G/DL (ref 12–16)
IMM GRANULOCYTES # BLD AUTO: 0.1 K/UL (ref 0–0.04)
IMM GRANULOCYTES NFR BLD AUTO: 0.5 % (ref 0–0.5)
LYMPHOCYTES # BLD AUTO: 2.1 K/UL (ref 1–4.8)
LYMPHOCYTES NFR BLD: 11 % (ref 18–48)
MCH RBC QN AUTO: 28.5 PG (ref 27–31)
MCHC RBC AUTO-ENTMCNC: 31 G/DL (ref 32–36)
MCV RBC AUTO: 92 FL (ref 82–98)
MONOCYTES # BLD AUTO: 1.4 K/UL (ref 0.3–1)
MONOCYTES NFR BLD: 7.1 % (ref 4–15)
NEUTROPHILS # BLD AUTO: 15.7 K/UL (ref 1.8–7.7)
NEUTROPHILS NFR BLD: 80.9 % (ref 38–73)
NRBC BLD-RTO: 0 /100 WBC
PLATELET # BLD AUTO: 278 K/UL (ref 150–350)
PMV BLD AUTO: 11.2 FL (ref 9.2–12.9)
POCT GLUCOSE: 129 MG/DL (ref 70–110)
POCT GLUCOSE: 172 MG/DL (ref 70–110)
POCT GLUCOSE: 174 MG/DL (ref 70–110)
POCT GLUCOSE: 212 MG/DL (ref 70–110)
POTASSIUM SERPL-SCNC: 3.7 MMOL/L (ref 3.5–5.1)
RBC # BLD AUTO: 3.16 M/UL (ref 4–5.4)
SODIUM SERPL-SCNC: 130 MMOL/L (ref 136–145)
WBC # BLD AUTO: 19.42 K/UL (ref 3.9–12.7)

## 2019-11-20 PROCEDURE — 25000003 PHARM REV CODE 250: Performed by: STUDENT IN AN ORGANIZED HEALTH CARE EDUCATION/TRAINING PROGRAM

## 2019-11-20 PROCEDURE — G0378 HOSPITAL OBSERVATION PER HR: HCPCS

## 2019-11-20 PROCEDURE — 96372 THER/PROPH/DIAG INJ SC/IM: CPT

## 2019-11-20 PROCEDURE — 36415 COLL VENOUS BLD VENIPUNCTURE: CPT

## 2019-11-20 PROCEDURE — 51798 US URINE CAPACITY MEASURE: CPT

## 2019-11-20 PROCEDURE — 97530 THERAPEUTIC ACTIVITIES: CPT

## 2019-11-20 PROCEDURE — 97161 PT EVAL LOW COMPLEX 20 MIN: CPT

## 2019-11-20 PROCEDURE — 80048 BASIC METABOLIC PNL TOTAL CA: CPT

## 2019-11-20 PROCEDURE — 97165 OT EVAL LOW COMPLEX 30 MIN: CPT

## 2019-11-20 PROCEDURE — 97116 GAIT TRAINING THERAPY: CPT

## 2019-11-20 PROCEDURE — 63600175 PHARM REV CODE 636 W HCPCS: Performed by: STUDENT IN AN ORGANIZED HEALTH CARE EDUCATION/TRAINING PROGRAM

## 2019-11-20 PROCEDURE — 85025 COMPLETE CBC W/AUTO DIFF WBC: CPT

## 2019-11-20 RX ADMIN — CELECOXIB 200 MG: 200 CAPSULE ORAL at 09:11

## 2019-11-20 RX ADMIN — DOCUSATE SODIUM 100 MG: 100 CAPSULE, LIQUID FILLED ORAL at 09:11

## 2019-11-20 RX ADMIN — METHOCARBAMOL TABLETS 750 MG: 750 TABLET, COATED ORAL at 09:11

## 2019-11-20 RX ADMIN — INSULIN ASPART 2 UNITS: 100 INJECTION, SOLUTION INTRAVENOUS; SUBCUTANEOUS at 06:11

## 2019-11-20 RX ADMIN — HEPARIN SODIUM 5000 UNITS: 5000 INJECTION, SOLUTION INTRAVENOUS; SUBCUTANEOUS at 12:11

## 2019-11-20 RX ADMIN — OXYCODONE HYDROCHLORIDE 5 MG: 5 TABLET ORAL at 04:11

## 2019-11-20 RX ADMIN — PANTOPRAZOLE SODIUM 40 MG: 40 TABLET, DELAYED RELEASE ORAL at 09:11

## 2019-11-20 RX ADMIN — OXYCODONE HYDROCHLORIDE 10 MG: 10 TABLET ORAL at 09:11

## 2019-11-20 RX ADMIN — CEFAZOLIN 2 G: 1 INJECTION, POWDER, FOR SOLUTION INTRAMUSCULAR; INTRAVENOUS at 09:11

## 2019-11-20 RX ADMIN — HEPARIN SODIUM 5000 UNITS: 5000 INJECTION, SOLUTION INTRAVENOUS; SUBCUTANEOUS at 09:11

## 2019-11-20 RX ADMIN — GABAPENTIN 300 MG: 300 CAPSULE ORAL at 09:11

## 2019-11-20 RX ADMIN — AMLODIPINE BESYLATE 5 MG: 5 TABLET ORAL at 09:11

## 2019-11-20 RX ADMIN — ACETAMINOPHEN 500 MG: 500 TABLET ORAL at 05:11

## 2019-11-20 RX ADMIN — INSULIN ASPART 2 UNITS: 100 INJECTION, SOLUTION INTRAVENOUS; SUBCUTANEOUS at 12:11

## 2019-11-20 RX ADMIN — ACETAMINOPHEN 500 MG: 500 TABLET ORAL at 09:11

## 2019-11-20 RX ADMIN — INSULIN ASPART 2 UNITS: 100 INJECTION, SOLUTION INTRAVENOUS; SUBCUTANEOUS at 09:11

## 2019-11-20 RX ADMIN — LOSARTAN POTASSIUM 25 MG: 25 TABLET ORAL at 09:11

## 2019-11-20 RX ADMIN — OXYCODONE HYDROCHLORIDE 10 MG: 10 TABLET ORAL at 01:11

## 2019-11-20 NOTE — PLAN OF CARE
OT Acute eval complete. Goals established. Pt required Mod A for bed mobility and Mod-Min A with st<>stand x 3 trials. Pt required handheld assist due to pain in B UE and inability to  RW. Pt. Is Min A with handheld assist ~10 steps t/f bed<>bedside chair. At this time, pt. Would benefit from SNF stay but will likely progress to HHPT with decreased pain.       Problem: Occupational Therapy Goal  Goal: Occupational Therapy Goal  Description  Goals to be met by: 12/20/19     Patient will increase functional independence with ADLs by performing:    UE Dressing with Stand-by Assistance.  LE Dressing with Minimal Assistance.  Grooming while standing with Stand-by Assistance.  Toileting from bedside commode with Stand-by Assistance for hygiene and clothing management.   Toilet transfer to bedside commode with Stand-by Assistance.     Outcome: Ongoing, Progressing

## 2019-11-20 NOTE — PT/OT/SLP EVAL
"Physical Therapy Evaluation    Patient Name:  Lala Saxena   MRN:  [id number]    Recommendations:     Discharge Recommendations:  nursing facility, skilled(May progress to home PT)   Discharge Equipment Recommendations: none   Barriers to discharge: None    Assessment:     Lala Saxena is a 59 y.o. female admitted with a medical diagnosis of Cervical myelopathy.  She presents with the following impairments/functional limitations:  weakness, impaired functional mobilty, impaired balance, decreased upper extremity function, impaired endurance, gait instability, impaired self care skills, decreased lower extremity function, pain, orthopedic precautions. Pt pleasant and cooperative but limited by pain and drowsiness this session. Pt requiring min-modA x 1 for bed mobility, transfers and taking steps this time with episodes of LE buckling and decreased ROM/muscle activation of BUEs. At this time, pt would benefit from SNF to maximize functional potential, however, pt may progress to home PT when more alert and with less pain. Will continue to follow.     Rehab Prognosis: Good; patient would benefit from acute skilled PT services to address these deficits and reach maximum level of function.    Recent Surgery: Procedure(s) (LRB):  FUSION, SPINE, CERVICAL C3-6 (N/A) 1 Day Post-Op    Plan:     During this hospitalization, patient to be seen daily to address the identified rehab impairments via gait training, therapeutic activities, neuromuscular re-education, therapeutic exercises and progress toward the following goals:    · Plan of Care Expires:  12/20/19    Subjective     Chief Complaint: Pain, drowsiness  Patient/Family Comments/goals: "I need that pink pillow."  Pain/Comfort:  · Pain Rating 1: 5/10  · Location - Side 1: Bilateral  · Location - Orientation 1: upper  · Location 1: arm  · Pain Addressed 1: Pre-medicate for activity, Reposition, Distraction, Cessation of Activity    Patients cultural, spiritual, " Quaker conflicts given the current situation: no    Living Environment:  Prior to admission, patients level of function was mod I-Independent. Lives with spouse in a 2 story home (main floor set-up) with threshold to enter. Equipment used at home: cane, straight, walker, rolling, rollator, bath bench, bedside commode.  Upon discharge, patient will have assistance from spouse, daughter in town until tomorrow 11/20.    Objective:     Communicated with RN prior to session.  Patient found HOB elevated with hemovac, peripheral IV, davalos catheter, SCD  upon PT entry to room.    General Precautions: Standard, fall   Orthopedic Precautions:spinal precautions, Full weight bearing   Braces: Aspen collar     Exams:  · Cognitive Exam:  Patient is oriented to Person, Place, Time and Situation, drowsy/lethargic during session, fell asleep while sitting up at EOB. Able to be redirected when sleepy.  · BLE Strength: Grossly 3-/5 based on functional mobility testing       Functional Mobility:  · Bed Mobility:     · Supine to Sit: moderate assistance via log roll, HOB elevated, pt required increased time due to pain  · Transfers:     · Sit to Stand:  moderate assistance progressing to minimal assistance over three trials with hand-held assist; attempted to use RW but unable to  RW securely at this time due to decreased  strength   · Bed to Chair: minimum assistance with  hand-held assist  using  Stand Pivot, pt able to take small steps to chair  · Gait: Pt able to take 3 L sidesteps with min-modA x 1 with HHA, one episode of LE buckling noted   · Balance: Fair       Therapeutic Activities and Exercises:   Pt educated on: PT role/POC; safety with mobility; benefits of OOB activities; log roll technique; spinal precautions; Pt verbalized understanding.       AM-PAC 6 CLICK MOBILITY  Total Score:14     Patient left up in chair with all lines intact, call button in reach, RN notified and daughter present.    GOALS:    Multidisciplinary Problems     Physical Therapy Goals        Problem: Physical Therapy Goal    Goal Priority Disciplines Outcome Goal Variances Interventions   Physical Therapy Goal     PT, PT/OT Ongoing, Progressing     Description:  Goals to be met by: 2019     Patient will increase functional independence with mobility by performin. Supine to sit with Stand-by Assistance  2. Sit to supine with Stand-by Assistance  3. Sit to stand transfer with Stand-by Assistance  4. Bed to chair transfer with Stand-by Assistance using Rolling Walker  5. Gait  x 50 feet with Stand-by Assistance using Rolling Walker                      History:     Past Medical History:   Diagnosis Date    Diabetes mellitus, type 2     Hyperlipidemia     Hypertension     Thyroid disease        Past Surgical History:   Procedure Laterality Date     SECTION         Time Tracking:     PT Received On: 19  PT Start Time: 851     PT Stop Time: 920  PT Total Time (min): 29 min     Billable Minutes: Evaluation 10 min and Gait Training 19 min      Loyda Dixon, PT, DPT  2019

## 2019-11-20 NOTE — PROGRESS NOTES
[facility]  Orthopedics  Progress Note    Patient Name: Lala Saxena  MRN: [id number]  Admission Date: 11/19/2019  Hospital Length of Stay: 0 days  Attending Provider: Miguel Cruz MD  Primary Care Provider: Edward Hernandez MD  Follow-up For: Procedure(s) (LRB):  FUSION, SPINE, CERVICAL C3-6 (N/A)    Post-Operative Day: 1 Day Post-Op  Subjective:     Principal Problem:Cervical myelopathy    Principal Orthopedic Problem: same    Interval History: Patient seen and examined at bedside.  No acute events overnight.  Pain controlled.  Walked with nursing staff yesterday. States subjective feeling of heavy arms this morning.      Review of patient's allergies indicates:  No Known Allergies    Current Facility-Administered Medications   Medication    acetaminophen tablet 500 mg    amLODIPine tablet 5 mg    atorvastatin tablet 10 mg    calcium carbonate 200 mg calcium (500 mg) chewable tablet 500 mg    ceFAZolin injection 2 g    celecoxib capsule 200 mg    dextrose 10% (D10W) Bolus    dextrose 10% (D10W) Bolus    diphenhydrAMINE capsule 25 mg    docusate sodium capsule 100 mg    gabapentin capsule 300 mg    glucagon (human recombinant) injection 1 mg    glucose chewable tablet 16 g    glucose chewable tablet 24 g    heparin (porcine) injection 5,000 Units    HYDROmorphone injection 0.2 mg    HYDROmorphone injection 0.5 mg    insulin aspart U-100 pen 1-10 Units    levothyroxine tablet 50 mcg    losartan tablet 25 mg    melatonin tablet 6 mg    methocarbamol tablet 750 mg    ondansetron tablet 8 mg    oxyCODONE immediate release tablet 10 mg    oxyCODONE immediate release tablet 15 mg    oxyCODONE immediate release tablet 5 mg    pantoprazole EC tablet 40 mg    polyethylene glycol packet 17 g    promethazine tablet 12.5 mg     Objective:     Vital Signs (Most Recent):  Temp: 98.1 °F (36.7 °C) (11/20/19 0349)  Pulse: 88 (11/20/19 0349)  Resp: 18 (11/19/19 2000)  BP: (!)  "151/66 (11/20/19 0349)  SpO2: 97 % (11/20/19 0349) Vital Signs (24h Range):  Temp:  [96.3 °F (35.7 °C)-98.5 °F (36.9 °C)] 98.1 °F (36.7 °C)  Pulse:  [78-88] 88  Resp:  [10-20] 18  SpO2:  [95 %-100 %] 97 %  BP: (147-188)/(60-84) 151/66  Arterial Line BP: (161-190)/(66-80) 161/66     Weight: 78.9 kg (174 lb)  Height: 5' 4" (162.6 cm)  Body mass index is 29.87 kg/m².      Intake/Output Summary (Last 24 hours) at 11/20/2019 0632  Last data filed at 11/20/2019 0400  Gross per 24 hour   Intake 2520 ml   Output 1752 ml   Net 768 ml       Ortho/SPM Exam  AAOx4  NAD  Reg rate  No increased WOB  Dressing c/d/i  WWP extremities  FCDs in place and functioning    Neuro:  5/5 strength C5-T1  SILT C5-T1    Significant Labs:   BMP:   Recent Labs   Lab 11/19/19  1019   *      K 4.0      CO2 24   BUN 17   CREATININE 0.8   CALCIUM 8.7     CBC:   Recent Labs   Lab 11/19/19  1019   WBC 13.42*   HGB 10.2*   HCT 33.1*   *     All pertinent labs within the past 24 hours have been reviewed.    Significant Imaging: None    Assessment/Plan:     * Cervical myelopathy  59 y.o. female  S/p C3-6 laminectomy and fusion    Pain control: multimodal  PT/OT: WBAT in cervical collar  DVT PPx: early ambulation, FCDs at all times when not ambulating  Abx: postop Ancef  Drain: 2 cc output, continue  Farmer: discontinue today    Dispo: f/u PT recs, continue to treat      Hyperlipidemia  Home meds    Essential hypertension  Home meds    Type 2 diabetes mellitus, without long-term current use of insulin  SSI          Derek Lopez MD  Orthopedics  [facility]  "

## 2019-11-20 NOTE — PLAN OF CARE
Pt is AAOx4. VSS. No falls/injury as pt calls for assistance when needed. No s/s of skin breakdown noted. Pt currently OOB and up in chair. Pt worked with PT/OT. Pain being monitored and controlled with PRN and scheduled meds. Post op antibiotics given as ordered. Accu checks ACHS and insulin given per order. Bed in lowest position. Call light within reach. Will continue to monitor.

## 2019-11-20 NOTE — ASSESSMENT & PLAN NOTE
59 y.o. female  S/p C3-6 laminectomy and fusion    Pain control: multimodal  PT/OT: WBAT in cervical collar  DVT PPx: early ambulation, FCDs at all times when not ambulating  Abx: postop Ancef  Drain: 2 cc output, continue  Farmer: discontinue today    Dispo: f/u PT recs, continue to treat

## 2019-11-20 NOTE — PLAN OF CARE
Problem: Physical Therapy Goal  Goal: Physical Therapy Goal  Description  Goals to be met by: 2019     Patient will increase functional independence with mobility by performin. Supine to sit with Stand-by Assistance  2. Sit to supine with Stand-by Assistance  3. Sit to stand transfer with Stand-by Assistance  4. Bed to chair transfer with Stand-by Assistance using Rolling Walker  5. Gait  x 50 feet with Stand-by Assistance using Rolling Walker     Outcome: Ongoing, Progressing     PT evaluation completed, goals appropriate. Will continue to follow while in house.     Loyda Dixon, PT, DPT  2019

## 2019-11-20 NOTE — PT/OT/SLP EVAL
Occupational Therapy   Evaluation    Name: Lala Saxena  MRN: [id number]  Admitting Diagnosis:  Cervical myelopathy 1 Day Post-Op    Recommendations:     Discharge Recommendations: nursing facility, skilled  Discharge Equipment Recommendations:     Barriers to discharge:  None    Assessment:     Lala Saxena is a 59 y.o. female with a medical diagnosis of Cervical myelopathy.  She presents with the following Performance deficits affecting function: impaired endurance, impaired self care skills, gait instability, impaired functional mobilty, impaired balance, decreased lower extremity function, decreased upper extremity function, decreased safety awareness, pain.      OT Acute eval complete. Goals established. Pt required Mod A for bed mobility and Mod-Min A with st<>stand x 3 trials. Pt required handheld assist due to pain in B UE and inability to  RW. Pt. Is Min A with handheld assist ~10 steps t/f bed<>bedside chair. At this time, pt. Would benefit from SNF stay but will likely progress to HHPT with decreased pain.     Rehab Prognosis: Good; patient would benefit from acute skilled OT services to address these deficits and reach maximum level of function.       Plan:     Patient to be seen 4 x/week to address the above listed problems via self-care/home management, therapeutic activities, therapeutic exercises  · Plan of Care Expires: 12/20/19  · Plan of Care Reviewed with: patient, daughter    Subjective     Chief Complaint: pain in B UE  Patient/Family Comments/goals: to go home    Occupational Profile:  Living Environment: Pt lives in 2SH with threshold to enter. Bed and bath downstairs.  Previous level of function: Independent  Equipment Used at Home:  cane, straight, walker, rolling, rollator, bedside commode, bath bench  Assistance upon Discharge: Daughter will be here until Thursday.  will be able to assist upon d/c.    Pain/Comfort:  · Pain Rating 1: 5/10  · Location - Side 1:  Bilateral  · Location - Orientation 1: upper  · Location 1: arm  · Pain Addressed 1: Pre-medicate for activity, Reposition, Distraction, Cessation of Activity    Patients cultural, spiritual, Zoroastrianism conflicts given the current situation: no    Objective:     Communicated with: RN prior to session.  Patient found supine with SCD, davalos catheter, hemovac upon OT entry to room.    General Precautions: Standard, fall   Orthopedic Precautions:spinal precautions, Full weight bearing   Braces: Aspen collar     Occupational Performance:    Bed Mobility:    · Patient completed Supine to Sit with moderate assistance    Functional Mobility/Transfers:  · Patient completed Sit <> Stand Transfer with moderate assistance  with  hand-held assist   · Patient completed Bed <> Chair Transfer using Step Transfer technique with moderate assistance with hand-held assist  Functional Mobility:  Pt required Mod A for bed mobility and Mod-Min A with st<>stand x 3 trials. Pt required handheld assist due to pain in B UE and inability to  RW. Pt. Is Min A with handheld assist ~10 steps t/f bed<>bedside chair.    Activities of Daily Living:  · Upper Body Dressing: maximal assistance hansa gown    Cognitive/Visual Perceptual:  Cognitive/Psychosocial Skills:     -       Oriented to: Person, Place, Time and Situation   -       Safety awareness/insight to disability: intact     Physical Exam:  Upper Extremity Range of Motion:     -       Right Upper Extremity: not tested due to pain'  -       Left Upper Extremity: not tested due to pain  Upper Extremity Strength:    -       Right Upper Extremity: not tested due to pain  -       Left Upper Extremity: not tested due to pain   Strength:    -       Right Upper Extremity: WFL  -       Left Upper Extremity: WFL    AMPAC 6 Click ADL:  AMPAC Total Score: 12    Treatment & Education:  - OT/POC-  - Importance of mobility to maximize recovery.  - safety w/ functional mobility; hand placement to  ensure safe transfers to various surfaces in prep for ADLs  - Encouraged pt to sit up in chair for daylight hours    Education:    Patient left up in chair with all lines intact, call button in reach, Rn notified and daugher present    GOALS:   Multidisciplinary Problems     Occupational Therapy Goals        Problem: Occupational Therapy Goal    Goal Priority Disciplines Outcome Interventions   Occupational Therapy Goal     OT, PT/OT Ongoing, Progressing    Description:  Goals to be met by: 19     Patient will increase functional independence with ADLs by performing:    UE Dressing with Stand-by Assistance.  LE Dressing with Minimal Assistance.  Grooming while standing with Stand-by Assistance.  Toileting from bedside commode with Stand-by Assistance for hygiene and clothing management.   Toilet transfer to bedside commode with Stand-by Assistance.                      History:     Past Medical History:   Diagnosis Date    Diabetes mellitus, type 2     Hyperlipidemia     Hypertension     Thyroid disease        Past Surgical History:   Procedure Laterality Date     SECTION         Time Tracking:     OT Date of Treatment: 19  OT Start Time: 851  OT Stop Time: 921  OT Total Time (min): 30 min    Billable Minutes:Evaluation 10  Therapeutic Activity 20    Bettie Krishnamurthy OT  2019

## 2019-11-20 NOTE — SUBJECTIVE & OBJECTIVE
"Principal Problem:Cervical myelopathy    Principal Orthopedic Problem: same    Interval History: Patient seen and examined at bedside.  No acute events overnight.  Pain controlled.  Walked with nursing staff yesterday. States subjective feeling of heavy arms this morning.      Review of patient's allergies indicates:  No Known Allergies    Current Facility-Administered Medications   Medication    acetaminophen tablet 500 mg    amLODIPine tablet 5 mg    atorvastatin tablet 10 mg    calcium carbonate 200 mg calcium (500 mg) chewable tablet 500 mg    ceFAZolin injection 2 g    celecoxib capsule 200 mg    dextrose 10% (D10W) Bolus    dextrose 10% (D10W) Bolus    diphenhydrAMINE capsule 25 mg    docusate sodium capsule 100 mg    gabapentin capsule 300 mg    glucagon (human recombinant) injection 1 mg    glucose chewable tablet 16 g    glucose chewable tablet 24 g    heparin (porcine) injection 5,000 Units    HYDROmorphone injection 0.2 mg    HYDROmorphone injection 0.5 mg    insulin aspart U-100 pen 1-10 Units    levothyroxine tablet 50 mcg    losartan tablet 25 mg    melatonin tablet 6 mg    methocarbamol tablet 750 mg    ondansetron tablet 8 mg    oxyCODONE immediate release tablet 10 mg    oxyCODONE immediate release tablet 15 mg    oxyCODONE immediate release tablet 5 mg    pantoprazole EC tablet 40 mg    polyethylene glycol packet 17 g    promethazine tablet 12.5 mg     Objective:     Vital Signs (Most Recent):  Temp: 98.1 °F (36.7 °C) (11/20/19 0349)  Pulse: 88 (11/20/19 0349)  Resp: 18 (11/19/19 2000)  BP: (!) 151/66 (11/20/19 0349)  SpO2: 97 % (11/20/19 0349) Vital Signs (24h Range):  Temp:  [96.3 °F (35.7 °C)-98.5 °F (36.9 °C)] 98.1 °F (36.7 °C)  Pulse:  [78-88] 88  Resp:  [10-20] 18  SpO2:  [95 %-100 %] 97 %  BP: (147-188)/(60-84) 151/66  Arterial Line BP: (161-190)/(66-80) 161/66     Weight: 78.9 kg (174 lb)  Height: 5' 4" (162.6 cm)  Body mass index is 29.87 " kg/m².      Intake/Output Summary (Last 24 hours) at 11/20/2019 0632  Last data filed at 11/20/2019 0400  Gross per 24 hour   Intake 2520 ml   Output 1752 ml   Net 768 ml       Ortho/SPM Exam  AAOx4  NAD  Reg rate  No increased WOB  Dressing c/d/i  WWP extremities  FCDs in place and functioning    Neuro:  5/5 strength C5-T1  SILT C5-T1    Significant Labs:   BMP:   Recent Labs   Lab 11/19/19  1019   *      K 4.0      CO2 24   BUN 17   CREATININE 0.8   CALCIUM 8.7     CBC:   Recent Labs   Lab 11/19/19  1019   WBC 13.42*   HGB 10.2*   HCT 33.1*   *     All pertinent labs within the past 24 hours have been reviewed.    Significant Imaging: None

## 2019-11-21 LAB
POCT GLUCOSE: 145 MG/DL (ref 70–110)
POCT GLUCOSE: 189 MG/DL (ref 70–110)

## 2019-11-21 PROCEDURE — 63600175 PHARM REV CODE 636 W HCPCS: Performed by: STUDENT IN AN ORGANIZED HEALTH CARE EDUCATION/TRAINING PROGRAM

## 2019-11-21 PROCEDURE — 25000003 PHARM REV CODE 250: Performed by: STUDENT IN AN ORGANIZED HEALTH CARE EDUCATION/TRAINING PROGRAM

## 2019-11-21 PROCEDURE — 11000001 HC ACUTE MED/SURG PRIVATE ROOM

## 2019-11-21 PROCEDURE — 97535 SELF CARE MNGMENT TRAINING: CPT

## 2019-11-21 PROCEDURE — 96372 THER/PROPH/DIAG INJ SC/IM: CPT

## 2019-11-21 PROCEDURE — 97116 GAIT TRAINING THERAPY: CPT

## 2019-11-21 RX ADMIN — ACETAMINOPHEN 500 MG: 500 TABLET ORAL at 06:11

## 2019-11-21 RX ADMIN — GABAPENTIN 300 MG: 300 CAPSULE ORAL at 10:11

## 2019-11-21 RX ADMIN — LEVOTHYROXINE SODIUM 50 MCG: 50 TABLET ORAL at 11:11

## 2019-11-21 RX ADMIN — PANTOPRAZOLE SODIUM 40 MG: 40 TABLET, DELAYED RELEASE ORAL at 09:11

## 2019-11-21 RX ADMIN — INSULIN ASPART 1 UNITS: 100 INJECTION, SOLUTION INTRAVENOUS; SUBCUTANEOUS at 10:11

## 2019-11-21 RX ADMIN — ATORVASTATIN CALCIUM 10 MG: 10 TABLET, FILM COATED ORAL at 09:11

## 2019-11-21 RX ADMIN — METHOCARBAMOL TABLETS 750 MG: 750 TABLET, COATED ORAL at 09:11

## 2019-11-21 RX ADMIN — INSULIN ASPART 2 UNITS: 100 INJECTION, SOLUTION INTRAVENOUS; SUBCUTANEOUS at 12:11

## 2019-11-21 RX ADMIN — HEPARIN SODIUM 5000 UNITS: 5000 INJECTION, SOLUTION INTRAVENOUS; SUBCUTANEOUS at 06:11

## 2019-11-21 RX ADMIN — LOSARTAN POTASSIUM 25 MG: 25 TABLET ORAL at 09:11

## 2019-11-21 RX ADMIN — CELECOXIB 200 MG: 200 CAPSULE ORAL at 09:11

## 2019-11-21 RX ADMIN — AMLODIPINE BESYLATE 5 MG: 5 TABLET ORAL at 09:11

## 2019-11-21 RX ADMIN — OXYCODONE HYDROCHLORIDE 10 MG: 10 TABLET ORAL at 03:11

## 2019-11-21 RX ADMIN — HEPARIN SODIUM 5000 UNITS: 5000 INJECTION, SOLUTION INTRAVENOUS; SUBCUTANEOUS at 02:11

## 2019-11-21 RX ADMIN — HEPARIN SODIUM 5000 UNITS: 5000 INJECTION, SOLUTION INTRAVENOUS; SUBCUTANEOUS at 10:11

## 2019-11-21 RX ADMIN — GABAPENTIN 300 MG: 300 CAPSULE ORAL at 09:11

## 2019-11-21 RX ADMIN — METHOCARBAMOL TABLETS 750 MG: 750 TABLET, COATED ORAL at 02:11

## 2019-11-21 RX ADMIN — OXYCODONE HYDROCHLORIDE 10 MG: 10 TABLET ORAL at 09:11

## 2019-11-21 RX ADMIN — DOCUSATE SODIUM 100 MG: 100 CAPSULE, LIQUID FILLED ORAL at 09:11

## 2019-11-21 RX ADMIN — GABAPENTIN 300 MG: 300 CAPSULE ORAL at 02:11

## 2019-11-21 RX ADMIN — DOCUSATE SODIUM 100 MG: 100 CAPSULE, LIQUID FILLED ORAL at 10:11

## 2019-11-21 RX ADMIN — METHOCARBAMOL TABLETS 750 MG: 750 TABLET, COATED ORAL at 10:11

## 2019-11-21 NOTE — PLAN OF CARE
Pt motivated for therapy and tolerated session well. Pt with noted improvement and less pain in B UE. Pt required Mod A in sit<>stand and t/f's due to decreased balance and weakness. Pt. Ambulated ~20 ft Mod A using RW to in-room bathroom to perform grooming tasks. Pt. Required 1 seated rest break during grooming tasks. Pt. Requires 24-hour assistance as this time and would benefit from SNF placement following to d/c to return to PLOF.      Problem: Occupational Therapy Goal  Goal: Occupational Therapy Goal  Description  Goals to be met by: 12/20/19     Patient will increase functional independence with ADLs by performing:    UE Dressing with Stand-by Assistance.  LE Dressing with Minimal Assistance.  Grooming while standing with Stand-by Assistance.  Toileting from bedside commode with Stand-by Assistance for hygiene and clothing management.   Toilet transfer to bedside commode with Stand-by Assistance.     Outcome: Ongoing, Progressing

## 2019-11-21 NOTE — PT/OT/SLP PROGRESS
Occupational Therapy   Treatment    Name: Lala Saxena  MRN: [id number]  Admitting Diagnosis:  Cervical myelopathy  2 Days Post-Op    Recommendations:     Discharge Recommendations: nursing facility, skilled  Discharge Equipment Recommendations:  none  Barriers to discharge:  None    Assessment:     Lala Saxena is a 59 y.o. female with a medical diagnosis of Cervical myelopathy.  She presents with the following Performance deficits affecting function are weakness, impaired endurance, impaired self care skills, gait instability, impaired functional mobilty, impaired balance, decreased lower extremity function, decreased safety awareness, pain.       Pt motivated for therapy and tolerated session well. Pt with noted improvement and less pain in B UE. Pt required Mod A in sit<>stand and t/f's due to decreased balance and weakness. Pt. Ambulated ~20 ft Mod A using RW to in-room bathroom to perform grooming tasks. Pt. Required 1 seated rest break during grooming tasks. Pt. Requires 24-hour assistance as this time and would benefit from SNF placement following to d/c to return to PLOF.    Rehab Prognosis:  Good; patient would benefit from acute skilled OT services to address these deficits and reach maximum level of function.       Plan:     Patient to be seen 4 x/week to address the above listed problems via self-care/home management, therapeutic activities, therapeutic exercises  · Plan of Care Expires: 12/20/19  · Plan of Care Reviewed with: patient, daughter    Subjective     Pain/Comfort:  · Pain Rating 1: 2/10  · Location - Orientation 1: upper  · Location 1: neck  · Pain Addressed 1: Pre-medicate for activity, Reposition, Distraction, Cessation of Activity    Objective:     Communicated with: RN prior to session.  Patient found up in chair with hemomac, SCD upon OT entry to room.    General Precautions: Standard, fall   Orthopedic Precautions:spinal precautions, Full weight bearing   Braces: Aspen collar      Occupational Performance:     Bed Mobility:    · Not tested     Functional Mobility/Transfers:  · Patient completed Sit <> Stand Transfer with moderate assistance  with  rolling walker   · Patient completed Toilet Transfer Step Transfer technique with moderate assistance with  rolling walker  · Functional Mobility: Pt with noted improvement and less pain in B UE. Pt required Mod A in sit<>stand and t/f's due to decreased balance and weakness. Pt. Ambulated ~20 ft Mod A using RW to in-room bathroom to perform grooming tasks. Pt. Required 1 seated rest break during grooming tasks. Pt. Requires 24-hour assistance as this time and would benefit from SNF placement following to d/c to return to PLOF.    Activities of Daily Living:  · Grooming: contact guard assistance standing at sink  · Toileting: stand by assistance for perineal hygiene      [facility] 6 Click ADL: 12    Treatment & Education:  - OT/POC-  - Importance of mobility to maximize recovery.  - Provided education on how to adjust and clean Aspen collar   - safety w/ functional mobility; hand placement to ensure safe transfers to various surfaces in prep for ADLs  - Reviewed gait sequence and RW management via verbalization and demonstration         Patient left up in chair with all lines intact, call button in reach, RN notified and daughter presentEducation:      GOALS:   Multidisciplinary Problems     Occupational Therapy Goals        Problem: Occupational Therapy Goal    Goal Priority Disciplines Outcome Interventions   Occupational Therapy Goal     OT, PT/OT Ongoing, Progressing    Description:  Goals to be met by: 12/20/19     Patient will increase functional independence with ADLs by performing:    UE Dressing with Stand-by Assistance.  LE Dressing with Minimal Assistance.  Grooming while standing with Stand-by Assistance.  Toileting from bedside commode with Stand-by Assistance for hygiene and clothing management.   Toilet transfer to bedside commode  with Stand-by Assistance.                      Time Tracking:     OT Date of Treatment: 11/21/19  OT Start Time: 0743  OT Stop Time: 0817  OT Total Time (min): 34 min    Billable Minutes:Self Care/Home Management 34    Bettie Krishnamurthy OT  11/21/2019

## 2019-11-21 NOTE — PLAN OF CARE
Quiet hours. Pain controlled with current meds. Slept well. Up to BSC, voiding without difficulty. Hemovac drain intact, no output  this am. Safety maintained.

## 2019-11-21 NOTE — PLAN OF CARE
Problem: Physical Therapy Goal  Goal: Physical Therapy Goal  Description  Goals to be met by: 2019     Patient will increase functional independence with mobility by performin. Supine to sit with Stand-by Assistance  2. Sit to supine with Stand-by Assistance  3. Sit to stand transfer with Stand-by Assistance  4. Bed to chair transfer with Stand-by Assistance using Rolling Walker  5. Gait  x 50 feet with Stand-by Assistance using Rolling Walker     Outcome: Ongoing, Progressing     Patient progressing appropriately towards current goals and plan of care remains appropriate at this time.  Patient with increasing mobility and activity tolerance.  Patient tolerated ambulation of 80 ft with rolling walker and min A for balance.  Patient with increased trunk sway and decreased core stability limiting overall balance.  Patient continues to benefit from strengthening and further mobility training in both an acute and skilled nursing setting to increase independence and home safety.      Keith Wayne PT, DPT  2019  Pager #: [phone]

## 2019-11-21 NOTE — PT/OT/SLP PROGRESS
"Physical Therapy Treatment    Patient Name:  Lala Saxena   MRN:  [id number]    Recommendations:     Discharge Recommendations:  nursing facility, skilled   Discharge Equipment Recommendations: none   Barriers to discharge: Decreased caregiver support    Assessment:     Lala Saxena is a 59 y.o. female admitted with a medical diagnosis of Cervical myelopathy.  She presents with the following impairments/functional limitations:  weakness, gait instability, decreased upper extremity function, decreased ROM, decreased lower extremity function, impaired balance, impaired endurance, impaired sensation, impaired self care skills, impaired functional mobilty, decreased coordination, orthopedic precautions, pain, impaired muscle length.      Patient with increasing mobility and activity tolerance.  Patient tolerated ambulation of 80 ft with rolling walker and min A for balance.  Patient with increased trunk sway and decreased core stability limiting overall balance.  Patient continues to benefit from strengthening and further mobility training in both an acute and skilled nursing setting to increase independence and home safety.      Rehab Prognosis: Good; patient continues to benefit from acute skilled PT services to address these deficits and reach maximum level of function.  Patient remains most appropriate to discharge to nursing facility, skilled  Recent Surgery: Procedure(s) (LRB):  FUSION, SPINE, CERVICAL C3-6 (N/A) 2 Days Post-Op    Plan:     During this hospitalization, patient to be seen 6 x/week to address the identified rehab impairments via gait training, therapeutic activities, therapeutic exercises, neuromuscular re-education and progress toward the following goals:    · Plan of Care Expires:  12/20/19    Subjective     Subjective: "It felt really good to walk."   Pain/Comfort:  · Pain Rating 1: 2/10  · Location 1: neck      Objective:     Communicated with RN prior to session.  Patient found supine " with SCD, hemovac, cervical collar upon PT entry to room.     General Precautions: Standard, fall   Orthopedic Precautions:spinal precautions, Full weight bearing   Braces: Aspen collar     Functional Mobility:  · Bed Mobility:     · Supine to Sit: moderate assistance  · Transfers:     · Sit to Stand:  contact guard assistance with rolling walker  · Gait: Patient ambulated 80 ft with rolling walker and minimum assistance for balance  ·       AM-PAC 6 CLICK MOBILITY  Turning over in bed (including adjusting bedclothes, sheets and blankets)?: 3  Sitting down on and standing up from a chair with arms (e.g., wheelchair, bedside commode, etc.): 3  Moving from lying on back to sitting on the side of the bed?: 3  Moving to and from a bed to a chair (including a wheelchair)?: 3  Need to walk in hospital room?: 3  Climbing 3-5 steps with a railing?: 2  Basic Mobility Total Score: 17       Therapeutic Activities and Exercises:   Gait training:  Patient required cues for sequencing and upright posture to increase independence and safety.  Patient required cues ~ 50% of the time.    Patient Education:    Patient educated on bed mobility training, Fall risk, gait training, home safety, Home exercise program, spinal surgery precautions and transfer training by explanation.  Patient was receptive to education and verbalizes understanding.     Patient left up in chair with all lines intact and call button in reach.    GOALS:   Multidisciplinary Problems     Physical Therapy Goals        Problem: Physical Therapy Goal    Goal Priority Disciplines Outcome Goal Variances Interventions   Physical Therapy Goal     PT, PT/OT Ongoing, Progressing     Description:  Goals to be met by: 2019     Patient will increase functional independence with mobility by performin. Supine to sit with Stand-by Assistance  2. Sit to supine with Stand-by Assistance  3. Sit to stand transfer with Stand-by Assistance  4. Bed to chair transfer with  Stand-by Assistance using Rolling Walker  5. Gait  x 50 feet with Stand-by Assistance using Rolling Walker                      Time Tracking:     PT Received On: 11/21/19  PT Start Time: 1411     PT Stop Time: 1436  PT Total Time (min): 25 min     Billable Minutes: Gait Training 25 min    Treatment Type: Treatment  PT/PTA: PT     PTA Visit Number: 0     Keith Wayne, PT  11/21/2019

## 2019-11-21 NOTE — ASSESSMENT & PLAN NOTE
59 y.o. female  S/p C3-6 laminectomy and fusion    Pain control: multimodal  PT/OT: WBAT in cervical collar  DVT PPx: early ambulation, FCDs at all times when not ambulating  Abx: postop Ancef  Drain: 2 cc output, may discontinue today  Farmer: discontinued    Dispo: f/u PT recs, continue to treat, may require SNF placement pending PT/OT progress

## 2019-11-21 NOTE — PROGRESS NOTES
Pt due to void by 1830. Pt up on bedside commode for about 20 minutes but unable to void. Denies feeling pressure or fullness. Bladder scan result showed 477 mls. MD paged. Awaiting return page    Dr. Wilcox returned page. Order to encourage PO intake and give patient 2 more hours to void. If unable to void, verbal order to straight cath patient. Will pass on to night shift nurse.

## 2019-11-21 NOTE — PROGRESS NOTES
[facility]  Orthopedics  Progress Note    Patient Name: Lala Saxena  MRN: [id number]  Admission Date: 11/19/2019  Hospital Length of Stay: 0 days  Attending Provider: Miguel Cruz MD  Primary Care Provider: Edward Hernandez MD  Follow-up For: Procedure(s) (LRB):  FUSION, SPINE, CERVICAL C3-6 (N/A)    Post-Operative Day: 2 Days Post-Op  Subjective:     Principal Problem:Cervical myelopathy    Principal Orthopedic Problem: same    Interval History: Patient seen and examined at bedside.  No acute events overnight.  Pain controlled.  Took 3 steps with PT yesterday.       Review of patient's allergies indicates:  No Known Allergies    Current Facility-Administered Medications   Medication    acetaminophen tablet 500 mg    amLODIPine tablet 5 mg    atorvastatin tablet 10 mg    calcium carbonate 200 mg calcium (500 mg) chewable tablet 500 mg    celecoxib capsule 200 mg    dextrose 10% (D10W) Bolus    dextrose 10% (D10W) Bolus    diphenhydrAMINE capsule 25 mg    docusate sodium capsule 100 mg    gabapentin capsule 300 mg    glucagon (human recombinant) injection 1 mg    glucose chewable tablet 16 g    glucose chewable tablet 24 g    heparin (porcine) injection 5,000 Units    HYDROmorphone injection 0.2 mg    HYDROmorphone injection 0.5 mg    insulin aspart U-100 pen 1-10 Units    levothyroxine tablet 50 mcg    losartan tablet 25 mg    melatonin tablet 6 mg    methocarbamol tablet 750 mg    ondansetron tablet 8 mg    oxyCODONE immediate release tablet 10 mg    oxyCODONE immediate release tablet 15 mg    oxyCODONE immediate release tablet 5 mg    pantoprazole EC tablet 40 mg    polyethylene glycol packet 17 g    promethazine tablet 12.5 mg     Objective:     Vital Signs (Most Recent):  Temp: 99 °F (37.2 °C) (11/21/19 0319)  Pulse: 87 (11/21/19 0319)  Resp: 16 (11/21/19 0319)  BP: (!) 168/71 (11/21/19 0319)  SpO2: 97 % (11/21/19 0319) Vital Signs (24h Range):  Temp:  [97.7  "°F (36.5 °C)-99 °F (37.2 °C)] 99 °F (37.2 °C)  Pulse:  [77-88] 87  Resp:  [16-18] 16  SpO2:  [96 %-100 %] 97 %  BP: (134-171)/(60-76) 168/71     Weight: 78.9 kg (174 lb)  Height: 5' 4" (162.6 cm)  Body mass index is 29.87 kg/m².      Intake/Output Summary (Last 24 hours) at 11/21/2019 0550  Last data filed at 11/21/2019 0300  Gross per 24 hour   Intake 1470 ml   Output 952 ml   Net 518 ml       Ortho/SPM Exam    AAOx4  NAD  Reg rate  No increased WOB  Dressing c/d/i  WWP extremities  FCDs in place and functioning    Neuro:  5/5 strength C5-T1  SILT C5-T1    Significant Labs:   BMP:   Recent Labs   Lab 11/20/19  0523   *   *   K 3.7      CO2 21*   BUN 13   CREATININE 0.7   CALCIUM 8.3*     CBC:   Recent Labs   Lab 11/19/19  1019 11/20/19  0523   WBC 13.42* 19.42*   HGB 10.2* 9.0*   HCT 33.1* 29.0*   * 278     All pertinent labs within the past 24 hours have been reviewed.    Significant Imaging: None    Assessment/Plan:     * Cervical myelopathy  59 y.o. female  S/p C3-6 laminectomy and fusion    Pain control: multimodal  PT/OT: WBAT in cervical collar  DVT PPx: early ambulation, FCDs at all times when not ambulating  Abx: postop Ancef  Drain: 2 cc output, may discontinue today  Farmer: discontinued    Dispo: f/u PT recs, continue to treat, may require SNF placement pending PT/OT progress      Hyperlipidemia  Home meds    Essential hypertension  Home meds    Type 2 diabetes mellitus, without long-term current use of insulin  SSI          Derek Lopez MD  Orthopedics  [facility]  "

## 2019-11-21 NOTE — SUBJECTIVE & OBJECTIVE
"Principal Problem:Cervical myelopathy    Principal Orthopedic Problem: same    Interval History: Patient seen and examined at bedside.  No acute events overnight.  Pain controlled.  Took 3 steps with PT yesterday.       Review of patient's allergies indicates:  No Known Allergies    Current Facility-Administered Medications   Medication    acetaminophen tablet 500 mg    amLODIPine tablet 5 mg    atorvastatin tablet 10 mg    calcium carbonate 200 mg calcium (500 mg) chewable tablet 500 mg    celecoxib capsule 200 mg    dextrose 10% (D10W) Bolus    dextrose 10% (D10W) Bolus    diphenhydrAMINE capsule 25 mg    docusate sodium capsule 100 mg    gabapentin capsule 300 mg    glucagon (human recombinant) injection 1 mg    glucose chewable tablet 16 g    glucose chewable tablet 24 g    heparin (porcine) injection 5,000 Units    HYDROmorphone injection 0.2 mg    HYDROmorphone injection 0.5 mg    insulin aspart U-100 pen 1-10 Units    levothyroxine tablet 50 mcg    losartan tablet 25 mg    melatonin tablet 6 mg    methocarbamol tablet 750 mg    ondansetron tablet 8 mg    oxyCODONE immediate release tablet 10 mg    oxyCODONE immediate release tablet 15 mg    oxyCODONE immediate release tablet 5 mg    pantoprazole EC tablet 40 mg    polyethylene glycol packet 17 g    promethazine tablet 12.5 mg     Objective:     Vital Signs (Most Recent):  Temp: 99 °F (37.2 °C) (11/21/19 0319)  Pulse: 87 (11/21/19 0319)  Resp: 16 (11/21/19 0319)  BP: (!) 168/71 (11/21/19 0319)  SpO2: 97 % (11/21/19 0319) Vital Signs (24h Range):  Temp:  [97.7 °F (36.5 °C)-99 °F (37.2 °C)] 99 °F (37.2 °C)  Pulse:  [77-88] 87  Resp:  [16-18] 16  SpO2:  [96 %-100 %] 97 %  BP: (134-171)/(60-76) 168/71     Weight: 78.9 kg (174 lb)  Height: 5' 4" (162.6 cm)  Body mass index is 29.87 kg/m².      Intake/Output Summary (Last 24 hours) at 11/21/2019 0550  Last data filed at 11/21/2019 0300  Gross per 24 hour   Intake 1470 ml   Output 952 ml "   Net 518 ml       Ortho/SPM Exam    AAOx4  NAD  Reg rate  No increased WOB  Dressing c/d/i  WWP extremities  FCDs in place and functioning    Neuro:  5/5 strength C5-T1  SILT C5-T1    Significant Labs:   BMP:   Recent Labs   Lab 11/20/19  0523   *   *   K 3.7      CO2 21*   BUN 13   CREATININE 0.7   CALCIUM 8.3*     CBC:   Recent Labs   Lab 11/19/19  1019 11/20/19  0523   WBC 13.42* 19.42*   HGB 10.2* 9.0*   HCT 33.1* 29.0*   * 278     All pertinent labs within the past 24 hours have been reviewed.    Significant Imaging: None

## 2019-11-21 NOTE — PLAN OF CARE
"CM to bedside - pt and dgtr present; pt provided assessment info. Pt w/ DME in place, lives w/ spouse. Pt w/ recs for SNF and referral sent to [facility]. Pt w/ drain still in place and she will stay in house until drain is removed.pt lives in a 2 story home w/ 2 steps to enter - pt's bedroom and bathroom is downstairs.    CM provided patient anticipated [facility] which will be update by nursing staff. Patient provided a "My Health Packet" for d/c planning and health tool. Patient verbalized understanding.    Ortho KAREN BACH [id number]     11/21/19 1101   Discharge Assessment   Assessment Type Discharge Planning Assessment   Confirmed/corrected address and phone number on facesheet? Yes   Assessment information obtained from? Patient;Caregiver   Expected Length of Stay (days) 3   Communicated expected length of stay with patient/caregiver yes   Prior to hospitilization cognitive status: Alert/Oriented   Prior to hospitalization functional status: Assistive Equipment;Needs Assistance   Current cognitive status: Alert/Oriented   Current Functional Status: Assistive Equipment;Needs Assistance   Facility Arrived From: N/A   Lives With spouse   Is patient able to care for self after discharge? Unable to determine at this time (comments)   Who are your caregiver(s) and their phone number(s)? spouse - Julian [phone]   Patient's perception of discharge disposition skilled nursing facility   Readmission Within the Last 30 Days no previous admission in last 30 days   Patient currently being followed by outpatient case management? No   Patient currently receives any other outside agency services? No   Equipment Currently Used at Home bedside commode;cane, straight;rollator;walker, rolling;shower chair;glucometer   Do you have any problems affording any of your prescribed medications? No   Is the patient taking medications as prescribed? yes   Does the patient have transportation home? Yes  (pt's family available for transport) "   Transportation Anticipated family or friend will provide   Dialysis Name and Scheduled days N/A   Does the patient receive services at the [facility]? No   Discharge Plan A Skilled Nursing Facility   Discharge Plan B Home with family;Home Health   DME Needed Upon Discharge  other (see comments)  (TBD)   Patient/Family in Agreement with Plan yes

## 2019-11-22 LAB
POCT GLUCOSE: 161 MG/DL (ref 70–110)
POCT GLUCOSE: 163 MG/DL (ref 70–110)
POCT GLUCOSE: 181 MG/DL (ref 70–110)
POCT GLUCOSE: 203 MG/DL (ref 70–110)
POCT GLUCOSE: 235 MG/DL (ref 70–110)

## 2019-11-22 PROCEDURE — 11000001 HC ACUTE MED/SURG PRIVATE ROOM

## 2019-11-22 PROCEDURE — 63600175 PHARM REV CODE 636 W HCPCS: Performed by: STUDENT IN AN ORGANIZED HEALTH CARE EDUCATION/TRAINING PROGRAM

## 2019-11-22 PROCEDURE — 97530 THERAPEUTIC ACTIVITIES: CPT

## 2019-11-22 PROCEDURE — 25000003 PHARM REV CODE 250: Performed by: STUDENT IN AN ORGANIZED HEALTH CARE EDUCATION/TRAINING PROGRAM

## 2019-11-22 RX ADMIN — OXYCODONE HYDROCHLORIDE 5 MG: 5 TABLET ORAL at 08:11

## 2019-11-22 RX ADMIN — METHOCARBAMOL TABLETS 750 MG: 750 TABLET, COATED ORAL at 08:11

## 2019-11-22 RX ADMIN — AMLODIPINE BESYLATE 5 MG: 5 TABLET ORAL at 08:11

## 2019-11-22 RX ADMIN — CELECOXIB 200 MG: 200 CAPSULE ORAL at 08:11

## 2019-11-22 RX ADMIN — LEVOTHYROXINE SODIUM 50 MCG: 50 TABLET ORAL at 08:11

## 2019-11-22 RX ADMIN — INSULIN ASPART 2 UNITS: 100 INJECTION, SOLUTION INTRAVENOUS; SUBCUTANEOUS at 09:11

## 2019-11-22 RX ADMIN — INSULIN ASPART 2 UNITS: 100 INJECTION, SOLUTION INTRAVENOUS; SUBCUTANEOUS at 05:11

## 2019-11-22 RX ADMIN — GABAPENTIN 300 MG: 300 CAPSULE ORAL at 09:11

## 2019-11-22 RX ADMIN — OXYCODONE HYDROCHLORIDE 10 MG: 10 TABLET ORAL at 04:11

## 2019-11-22 RX ADMIN — GABAPENTIN 300 MG: 300 CAPSULE ORAL at 08:11

## 2019-11-22 RX ADMIN — ATORVASTATIN CALCIUM 10 MG: 10 TABLET, FILM COATED ORAL at 09:11

## 2019-11-22 RX ADMIN — DOCUSATE SODIUM 100 MG: 100 CAPSULE, LIQUID FILLED ORAL at 08:11

## 2019-11-22 RX ADMIN — ACETAMINOPHEN 500 MG: 500 TABLET ORAL at 02:11

## 2019-11-22 RX ADMIN — PANTOPRAZOLE SODIUM 40 MG: 40 TABLET, DELAYED RELEASE ORAL at 08:11

## 2019-11-22 RX ADMIN — ACETAMINOPHEN 500 MG: 500 TABLET ORAL at 06:11

## 2019-11-22 RX ADMIN — INSULIN ASPART 2 UNITS: 100 INJECTION, SOLUTION INTRAVENOUS; SUBCUTANEOUS at 08:11

## 2019-11-22 RX ADMIN — GABAPENTIN 300 MG: 300 CAPSULE ORAL at 02:11

## 2019-11-22 RX ADMIN — METHOCARBAMOL TABLETS 750 MG: 750 TABLET, COATED ORAL at 02:11

## 2019-11-22 RX ADMIN — LOSARTAN POTASSIUM 25 MG: 25 TABLET ORAL at 08:11

## 2019-11-22 RX ADMIN — OXYCODONE HYDROCHLORIDE 10 MG: 10 TABLET ORAL at 02:11

## 2019-11-22 RX ADMIN — HEPARIN SODIUM 5000 UNITS: 5000 INJECTION, SOLUTION INTRAVENOUS; SUBCUTANEOUS at 06:11

## 2019-11-22 RX ADMIN — HEPARIN SODIUM 5000 UNITS: 5000 INJECTION, SOLUTION INTRAVENOUS; SUBCUTANEOUS at 02:11

## 2019-11-22 RX ADMIN — ACETAMINOPHEN 500 MG: 500 TABLET ORAL at 09:11

## 2019-11-22 RX ADMIN — HEPARIN SODIUM 5000 UNITS: 5000 INJECTION, SOLUTION INTRAVENOUS; SUBCUTANEOUS at 09:11

## 2019-11-22 RX ADMIN — INSULIN ASPART 4 UNITS: 100 INJECTION, SOLUTION INTRAVENOUS; SUBCUTANEOUS at 01:11

## 2019-11-22 RX ADMIN — OXYCODONE HYDROCHLORIDE 10 MG: 10 TABLET ORAL at 08:11

## 2019-11-22 NOTE — PLAN OF CARE
Patient AAOX4, call light and belongings in reach, siderails upx 2, scds on and operating, spouse at bedside. Posterior neck dressing c/d/I, c-collar on, hemovac in place.  Pain controlled with current pain regimen.  No complaint of N/V and tolerating diet well.  No IV access and MD aware.  Patient remains free from falls and safety maintained this shift.

## 2019-11-22 NOTE — ASSESSMENT & PLAN NOTE
59 y.o. female  S/p C3-6 laminectomy and fusion    Pain control: multimodal  PT/OT: WBAT in cervical collar  DVT PPx: early ambulation, FCDs at all times when not ambulating  Abx: postop Ancef completed  Drain: discontinued      Dispo: likely SNF vs home today

## 2019-11-22 NOTE — PROGRESS NOTES
[facility]  Orthopedics  Progress Note    Patient Name: Lala Saxena  MRN: [id number]  Admission Date: 11/19/2019  Hospital Length of Stay: 1 days  Attending Provider: Miguel Cruz MD  Primary Care Provider: Edward Hernandez MD  Follow-up For: Procedure(s) (LRB):  FUSION, SPINE, CERVICAL C3-6 (N/A)    Post-Operative Day: 3 Days Post-Op  Subjective:     Principal Problem:Cervical myelopathy    Principal Orthopedic Problem: same    Interval History: Patient seen and examined at bedside.  No acute events overnight.  Pain controlled.  Drain discontinued this morning.    Review of patient's allergies indicates:  No Known Allergies    Current Facility-Administered Medications   Medication    acetaminophen tablet 500 mg    amLODIPine tablet 5 mg    atorvastatin tablet 10 mg    calcium carbonate 200 mg calcium (500 mg) chewable tablet 500 mg    celecoxib capsule 200 mg    dextrose 10% (D10W) Bolus    dextrose 10% (D10W) Bolus    diphenhydrAMINE capsule 25 mg    docusate sodium capsule 100 mg    gabapentin capsule 300 mg    glucagon (human recombinant) injection 1 mg    glucose chewable tablet 16 g    glucose chewable tablet 24 g    heparin (porcine) injection 5,000 Units    HYDROmorphone injection 0.2 mg    HYDROmorphone injection 0.5 mg    insulin aspart U-100 pen 1-10 Units    levothyroxine tablet 50 mcg    losartan tablet 25 mg    melatonin tablet 6 mg    methocarbamol tablet 750 mg    ondansetron tablet 8 mg    oxyCODONE immediate release tablet 10 mg    oxyCODONE immediate release tablet 15 mg    oxyCODONE immediate release tablet 5 mg    pantoprazole EC tablet 40 mg    polyethylene glycol packet 17 g    promethazine tablet 12.5 mg     Objective:     Vital Signs (Most Recent):  Temp: 97 °F (36.1 °C) (11/22/19 0404)  Pulse: 90 (11/22/19 0404)  Resp: 16 (11/22/19 0404)  BP: (!) 136/92 (11/22/19 0404)  SpO2: 98 % (11/22/19 0404) Vital Signs (24h Range):  Temp:  [97 °F  "(36.1 °C)-98.6 °F (37 °C)] 97 °F (36.1 °C)  Pulse:  [83-90] 90  Resp:  [16-18] 16  SpO2:  [96 %-98 %] 98 %  BP: (103-156)/(51-92) 136/92     Weight: 78.9 kg (174 lb)  Height: 5' 4" (162.6 cm)  Body mass index is 29.87 kg/m².      Intake/Output Summary (Last 24 hours) at 11/22/2019 0639  Last data filed at 11/21/2019 2300  Gross per 24 hour   Intake 240 ml   Output 1307 ml   Net -1067 ml       Ortho/SPM Exam    AAOx4  NAD  Reg rate  No increased WOB  Dressing some drainage  WWP extremities  FCDs in place and functioning    Neuro:  5/5 strength C5-T1  SILT C5-T1    Significant Labs:   BMP:   No results for input(s): GLU, NA, K, CL, CO2, BUN, CREATININE, CALCIUM, MG in the last 48 hours.  CBC:   No results for input(s): WBC, HGB, HCT, PLT in the last 48 hours.  All pertinent labs within the past 24 hours have been reviewed.    Significant Imaging: None    Assessment/Plan:     * Cervical myelopathy  59 y.o. female  S/p C3-6 laminectomy and fusion    Pain control: multimodal  PT/OT: WBAT in cervical collar  DVT PPx: early ambulation, FCDs at all times when not ambulating  Abx: postop Ancef completed  Drain: discontinued      Dispo: likely SNF vs home today    Hyperlipidemia  Home meds    Essential hypertension  Home meds    Type 2 diabetes mellitus, without long-term current use of insulin  SSI          Derek Lopez MD  Orthopedics  [facility]  "

## 2019-11-22 NOTE — PLAN OF CARE
KAREN contacted [facility] - per chart nurse, mAanda pt no on list for a transfer and she is not aware if auth received. Amanda states she has not way to f/u on auth. KAREN contacted Aric toscano/ [facility] and left a msg inquiring about SNF auth. KAREN also updated case mgmt leadership on pt's delay in transfer to SNF.    Update: KAREN received a call from Tracy toscano/ KAREN leadership - Trinity toscano/ [facility] has not received auth as of 3:30pm. She was given on call KAREN and ANAIS's contact numbers so if auth was received then pt could transfer.

## 2019-11-22 NOTE — PLAN OF CARE
Pt motivated for therapy and tolerated session well. Pt. Requires Min A in t/f's, ambulation, and adls due to decreased balance and overall weakness. Pt ambulated ~20ft x2 Min A using RW with seated rest breaks between trials. Pt would benefit SNF placement following d/c to return to PLOF and decrease burden of care.      Problem: Occupational Therapy Goal  Goal: Occupational Therapy Goal  Description  Goals to be met by: 12/20/19     Patient will increase functional independence with ADLs by performing:    UE Dressing with Stand-by Assistance.  LE Dressing with Minimal Assistance.  Grooming while standing with Stand-by Assistance.  Toileting from bedside commode with Stand-by Assistance for hygiene and clothing management.   Toilet transfer to bedside commode with Stand-by Assistance.     Outcome: Ongoing, Progressing

## 2019-11-22 NOTE — PLAN OF CARE
Problem: Physical Therapy Goal  Goal: Physical Therapy Goal  Description  Goals to be met by: 2019     Patient will increase functional independence with mobility by performin. Supine to sit with Stand-by Assistance  2. Sit to supine with Stand-by Assistance  3. Sit to stand transfer with Stand-by Assistance  4. Bed to chair transfer with Stand-by Assistance using Rolling Walker  5. Gait  x 50 feet with Stand-by Assistance using Rolling Walker     Outcome: Ongoing, Progressing     Patient progressing appropriately towards current goals and plan of care remains appropriate at this time.  Patient with increased pain and numbness following further surgery.  Patient demonstrated good safety with ambulation and able to tolerate ambulation of 100 ft with rolling walker and CGA.  Patient did require increased cues for gait with increased pain.  Patient continues to benefit from focus on strengthening and mobility training to increase home safety.  Patient will benefit from further skilled PT in an acute and home health setting.      Keith Wayne PT, DPT  2019  Pager #: [phone]

## 2019-11-22 NOTE — PLAN OF CARE
Quiet hours. Using hands more. Pain controlled with current meds. Slept well. C collar remains intact. Hemovac drain intact with no drainage noted this am. VSS. Safety maintained.

## 2019-11-22 NOTE — SUBJECTIVE & OBJECTIVE
"Principal Problem:Cervical myelopathy    Principal Orthopedic Problem: same    Interval History: Patient seen and examined at bedside.  No acute events overnight.  Pain controlled.  Drain discontinued this morning.    Review of patient's allergies indicates:  No Known Allergies    Current Facility-Administered Medications   Medication    acetaminophen tablet 500 mg    amLODIPine tablet 5 mg    atorvastatin tablet 10 mg    calcium carbonate 200 mg calcium (500 mg) chewable tablet 500 mg    celecoxib capsule 200 mg    dextrose 10% (D10W) Bolus    dextrose 10% (D10W) Bolus    diphenhydrAMINE capsule 25 mg    docusate sodium capsule 100 mg    gabapentin capsule 300 mg    glucagon (human recombinant) injection 1 mg    glucose chewable tablet 16 g    glucose chewable tablet 24 g    heparin (porcine) injection 5,000 Units    HYDROmorphone injection 0.2 mg    HYDROmorphone injection 0.5 mg    insulin aspart U-100 pen 1-10 Units    levothyroxine tablet 50 mcg    losartan tablet 25 mg    melatonin tablet 6 mg    methocarbamol tablet 750 mg    ondansetron tablet 8 mg    oxyCODONE immediate release tablet 10 mg    oxyCODONE immediate release tablet 15 mg    oxyCODONE immediate release tablet 5 mg    pantoprazole EC tablet 40 mg    polyethylene glycol packet 17 g    promethazine tablet 12.5 mg     Objective:     Vital Signs (Most Recent):  Temp: 97 °F (36.1 °C) (11/22/19 0404)  Pulse: 90 (11/22/19 0404)  Resp: 16 (11/22/19 0404)  BP: (!) 136/92 (11/22/19 0404)  SpO2: 98 % (11/22/19 0404) Vital Signs (24h Range):  Temp:  [97 °F (36.1 °C)-98.6 °F (37 °C)] 97 °F (36.1 °C)  Pulse:  [83-90] 90  Resp:  [16-18] 16  SpO2:  [96 %-98 %] 98 %  BP: (103-156)/(51-92) 136/92     Weight: 78.9 kg (174 lb)  Height: 5' 4" (162.6 cm)  Body mass index is 29.87 kg/m².      Intake/Output Summary (Last 24 hours) at 11/22/2019 0639  Last data filed at 11/21/2019 2300  Gross per 24 hour   Intake 240 ml   Output 1307 ml   Net " -1067 ml       Ortho/SPM Exam    AAOx4  NAD  Reg rate  No increased WOB  Dressing some drainage  WWP extremities  FCDs in place and functioning    Neuro:  5/5 strength C5-T1  SILT C5-T1    Significant Labs:   BMP:   No results for input(s): GLU, NA, K, CL, CO2, BUN, CREATININE, CALCIUM, MG in the last 48 hours.  CBC:   No results for input(s): WBC, HGB, HCT, PLT in the last 48 hours.  All pertinent labs within the past 24 hours have been reviewed.    Significant Imaging: None

## 2019-11-22 NOTE — PLAN OF CARE
Pt A & O x 4, however, speech and movement slow, walked in galdamez with PT and up to bedside commode several times, and sitting in chair for much of day, pt's edema in hands has gone down and  strength has improved over the course of the shift, VS as charted, family at bedside, fall precautions in place.

## 2019-11-22 NOTE — PT/OT/SLP PROGRESS
Occupational Therapy   Treatment    Name: Lala Saxena  MRN: [id number]  Admitting Diagnosis:  Cervical myelopathy  3 Days Post-Op    Recommendations:     Discharge Recommendations: nursing facility, skilled  Discharge Equipment Recommendations:  none  Barriers to discharge:  Decreased caregiver support    Assessment:     Lala Saxena is a 59 y.o. female with a medical diagnosis of Cervical myelopathy.  She presents with the following Performance deficits affecting function are weakness, impaired endurance, impaired self care skills, gait instability, impaired functional mobilty, decreased upper extremity function, decreased lower extremity function, decreased safety awareness, pain, decreased coordination, impaired balance.   Pt motivated for therapy and tolerated session well. Pt. Requires Min A in t/f's, ambulation, and adls due to decreased balance and overall weakness. Pt ambulated ~20ft x2 Min A using RW with seated rest breaks between trials. Pt would benefit SNF placement following d/c to return to PLOF and decrease burden of care.    Rehab Prognosis:  Good; patient would benefit from acute skilled OT services to address these deficits and reach maximum level of function.       Plan:     Patient to be seen 5 x/week to address the above listed problems via self-care/home management, therapeutic activities, therapeutic exercises  · Plan of Care Expires: 12/20/19  · Plan of Care Reviewed with: patient, spouse    Subjective     Pain/Comfort:  · Pain Rating 1: 2/10  · Location - Side 1: Bilateral  · Location - Orientation 1: upper  · Location 1: neck  · Pain Addressed 1: Pre-medicate for activity, Reposition, Distraction, Cessation of Activity    Objective:     Communicated with: RN prior to session.  Patient found up in chair with cervical collar upon OT entry to room.    General Precautions: Standard, fall   Orthopedic Precautions:spinal precautions, Full weight bearing   Braces: Aspen collar      Occupational Performance:     Bed Mobility:    · Patient completed Sit to Supine with contact guard assistance     Functional Mobility/Transfers:  · Patient completed Sit <> Stand Transfer with minimum assistance  with  rolling walker   · Patient completed Bed <> Chair Transfer using Step Transfer technique with minimum assistance with rolling walker  · Functional Mobility:  Pt. Requires Min A in t/f's, ambulation, and adls due to decreased balance and overall weakness. Pt ambulated ~20ft x2 Min A using RW with seated rest breaks between trials. Pt would benefit SNF placement following d/c to return to PLOF and decrease burden of care.    Activities of Daily Living:  · Grooming: stand by assistance seated at sink      [facility] 6 Click ADL: 12    Treatment & Education:  - OT/POC-  - Importance of mobility to maximize recovery.  - safety w/ functional mobility; hand placement to ensure safe transfers to various surfaces in prep for ADLs  - Reviewed gait sequence and RW management via verbalization and demonstration   - encouraged to ambulate within household environment at least every hour to hour 1/2  - Spoke at length with pt and spouse regarding IPR and SNF      Patient left supine with all lines intact, call button in reach, RN notified and spouse presentEducation:      GOALS:   Multidisciplinary Problems     Occupational Therapy Goals        Problem: Occupational Therapy Goal    Goal Priority Disciplines Outcome Interventions   Occupational Therapy Goal     OT, PT/OT Ongoing, Progressing    Description:  Goals to be met by: 12/20/19     Patient will increase functional independence with ADLs by performing:    UE Dressing with Stand-by Assistance.  LE Dressing with Minimal Assistance.  Grooming while standing with Stand-by Assistance.  Toileting from bedside commode with Stand-by Assistance for hygiene and clothing management.   Toilet transfer to bedside commode with Stand-by Assistance.                       Time Tracking:     OT Date of Treatment: 11/22/19  OT Start Time: 1059  OT Stop Time: 1126  OT Total Time (min): 27 min    Billable Minutes:Therapeutic Activity 27    Bettie Krishnamurthy OT  11/22/2019

## 2019-11-23 LAB
POCT GLUCOSE: 193 MG/DL (ref 70–110)
POCT GLUCOSE: 200 MG/DL (ref 70–110)
POCT GLUCOSE: 245 MG/DL (ref 70–110)

## 2019-11-23 PROCEDURE — 25000003 PHARM REV CODE 250: Performed by: STUDENT IN AN ORGANIZED HEALTH CARE EDUCATION/TRAINING PROGRAM

## 2019-11-23 PROCEDURE — 63600175 PHARM REV CODE 636 W HCPCS: Performed by: STUDENT IN AN ORGANIZED HEALTH CARE EDUCATION/TRAINING PROGRAM

## 2019-11-23 PROCEDURE — 11000001 HC ACUTE MED/SURG PRIVATE ROOM

## 2019-11-23 RX ADMIN — HEPARIN SODIUM 5000 UNITS: 5000 INJECTION, SOLUTION INTRAVENOUS; SUBCUTANEOUS at 02:11

## 2019-11-23 RX ADMIN — INSULIN ASPART 4 UNITS: 100 INJECTION, SOLUTION INTRAVENOUS; SUBCUTANEOUS at 01:11

## 2019-11-23 RX ADMIN — LOSARTAN POTASSIUM 25 MG: 25 TABLET ORAL at 08:11

## 2019-11-23 RX ADMIN — LEVOTHYROXINE SODIUM 50 MCG: 50 TABLET ORAL at 08:11

## 2019-11-23 RX ADMIN — ACETAMINOPHEN 500 MG: 500 TABLET ORAL at 02:11

## 2019-11-23 RX ADMIN — GABAPENTIN 300 MG: 300 CAPSULE ORAL at 08:11

## 2019-11-23 RX ADMIN — AMLODIPINE BESYLATE 5 MG: 5 TABLET ORAL at 08:11

## 2019-11-23 RX ADMIN — PANTOPRAZOLE SODIUM 40 MG: 40 TABLET, DELAYED RELEASE ORAL at 08:11

## 2019-11-23 RX ADMIN — GABAPENTIN 300 MG: 300 CAPSULE ORAL at 02:11

## 2019-11-23 RX ADMIN — INSULIN ASPART 2 UNITS: 100 INJECTION, SOLUTION INTRAVENOUS; SUBCUTANEOUS at 08:11

## 2019-11-23 RX ADMIN — HEPARIN SODIUM 5000 UNITS: 5000 INJECTION, SOLUTION INTRAVENOUS; SUBCUTANEOUS at 09:11

## 2019-11-23 RX ADMIN — DOCUSATE SODIUM 100 MG: 100 CAPSULE, LIQUID FILLED ORAL at 08:11

## 2019-11-23 RX ADMIN — DOCUSATE SODIUM 100 MG: 100 CAPSULE, LIQUID FILLED ORAL at 09:11

## 2019-11-23 RX ADMIN — CELECOXIB 200 MG: 200 CAPSULE ORAL at 08:11

## 2019-11-23 RX ADMIN — GABAPENTIN 300 MG: 300 CAPSULE ORAL at 09:11

## 2019-11-23 RX ADMIN — ATORVASTATIN CALCIUM 10 MG: 10 TABLET, FILM COATED ORAL at 08:11

## 2019-11-23 RX ADMIN — OXYCODONE HYDROCHLORIDE 5 MG: 5 TABLET ORAL at 04:11

## 2019-11-23 RX ADMIN — OXYCODONE HYDROCHLORIDE 10 MG: 10 TABLET ORAL at 09:11

## 2019-11-23 RX ADMIN — ACETAMINOPHEN 500 MG: 500 TABLET ORAL at 09:11

## 2019-11-23 RX ADMIN — METHOCARBAMOL TABLETS 750 MG: 750 TABLET, COATED ORAL at 09:11

## 2019-11-23 RX ADMIN — HEPARIN SODIUM 5000 UNITS: 5000 INJECTION, SOLUTION INTRAVENOUS; SUBCUTANEOUS at 05:11

## 2019-11-23 RX ADMIN — INSULIN ASPART 4 UNITS: 100 INJECTION, SOLUTION INTRAVENOUS; SUBCUTANEOUS at 05:11

## 2019-11-23 NOTE — NURSING
Pageblanca Cruz's team patient with serosanguinous drainage from neck dressing/foam dressing changed.

## 2019-11-23 NOTE — SUBJECTIVE & OBJECTIVE
"Principal Problem:Cervical myelopathy    Principal Orthopedic Problem: same    Interval History: Patient seen and examined at bedside.  No acute events overnight.  Pain controlled. Walking around the room with walker this morning.     Review of patient's allergies indicates:  No Known Allergies    Current Facility-Administered Medications   Medication    acetaminophen tablet 500 mg    amLODIPine tablet 5 mg    atorvastatin tablet 10 mg    calcium carbonate 200 mg calcium (500 mg) chewable tablet 500 mg    celecoxib capsule 200 mg    dextrose 10% (D10W) Bolus    dextrose 10% (D10W) Bolus    diphenhydrAMINE capsule 25 mg    docusate sodium capsule 100 mg    gabapentin capsule 300 mg    glucagon (human recombinant) injection 1 mg    glucose chewable tablet 16 g    glucose chewable tablet 24 g    heparin (porcine) injection 5,000 Units    HYDROmorphone injection 0.2 mg    HYDROmorphone injection 0.5 mg    insulin aspart U-100 pen 1-10 Units    levothyroxine tablet 50 mcg    losartan tablet 25 mg    melatonin tablet 6 mg    methocarbamol tablet 750 mg    ondansetron tablet 8 mg    oxyCODONE immediate release tablet 10 mg    oxyCODONE immediate release tablet 15 mg    oxyCODONE immediate release tablet 5 mg    pantoprazole EC tablet 40 mg    polyethylene glycol packet 17 g    promethazine tablet 12.5 mg     Objective:     Vital Signs (Most Recent):  Temp: 97.5 °F (36.4 °C) (11/23/19 0523)  Pulse: 87 (11/23/19 0523)  Resp: 16 (11/23/19 0523)  BP: (!) 157/68 (11/23/19 0523)  SpO2: 98 % (11/23/19 0523) Vital Signs (24h Range):  Temp:  [96.3 °F (35.7 °C)-98.2 °F (36.8 °C)] 97.5 °F (36.4 °C)  Pulse:  [73-87] 87  Resp:  [16-18] 16  SpO2:  [91 %-98 %] 98 %  BP: (120-157)/(56-76) 157/68     Weight: 78.9 kg (174 lb)  Height: 5' 4" (162.6 cm)  Body mass index is 29.87 kg/m².      Intake/Output Summary (Last 24 hours) at 11/23/2019 0724  Last data filed at 11/22/2019 1730  Gross per 24 hour   Intake 900 " ml   Output --   Net 900 ml       Ortho/SPM Exam    AAOx4  NAD  Reg rate  No increased WOB  Dressing with some drainage  WWP extremities  FCDs in place and functioning    Neuro:  5/5 strength C5-T1  SILT C5-T1    Significant Labs:   BMP:   No results for input(s): GLU, NA, K, CL, CO2, BUN, CREATININE, CALCIUM, MG in the last 48 hours.  CBC:   No results for input(s): WBC, HGB, HCT, PLT in the last 48 hours.  All pertinent labs within the past 24 hours have been reviewed.    Significant Imaging: None

## 2019-11-23 NOTE — PROGRESS NOTES
[facility]  Orthopedics  Progress Note    Patient Name: Lala Saxena  MRN: [id number]  Admission Date: 11/19/2019  Hospital Length of Stay: 2 days  Attending Provider: Miguel Cruz MD  Primary Care Provider: Edward Hernandez MD  Follow-up For: Procedure(s) (LRB):  FUSION, SPINE, CERVICAL C3-6 (N/A)    Post-Operative Day: 4 Days Post-Op  Subjective:     Principal Problem:Cervical myelopathy    Principal Orthopedic Problem: same    Interval History: Patient seen and examined at bedside.  No acute events overnight.  Pain controlled. Walking around the room with walker this morning.     Review of patient's allergies indicates:  No Known Allergies    Current Facility-Administered Medications   Medication    acetaminophen tablet 500 mg    amLODIPine tablet 5 mg    atorvastatin tablet 10 mg    calcium carbonate 200 mg calcium (500 mg) chewable tablet 500 mg    celecoxib capsule 200 mg    dextrose 10% (D10W) Bolus    dextrose 10% (D10W) Bolus    diphenhydrAMINE capsule 25 mg    docusate sodium capsule 100 mg    gabapentin capsule 300 mg    glucagon (human recombinant) injection 1 mg    glucose chewable tablet 16 g    glucose chewable tablet 24 g    heparin (porcine) injection 5,000 Units    HYDROmorphone injection 0.2 mg    HYDROmorphone injection 0.5 mg    insulin aspart U-100 pen 1-10 Units    levothyroxine tablet 50 mcg    losartan tablet 25 mg    melatonin tablet 6 mg    methocarbamol tablet 750 mg    ondansetron tablet 8 mg    oxyCODONE immediate release tablet 10 mg    oxyCODONE immediate release tablet 15 mg    oxyCODONE immediate release tablet 5 mg    pantoprazole EC tablet 40 mg    polyethylene glycol packet 17 g    promethazine tablet 12.5 mg     Objective:     Vital Signs (Most Recent):  Temp: 97.5 °F (36.4 °C) (11/23/19 0523)  Pulse: 87 (11/23/19 0523)  Resp: 16 (11/23/19 0523)  BP: (!) 157/68 (11/23/19 0523)  SpO2: 98 % (11/23/19 0523) Vital Signs (24h  "Range):  Temp:  [96.3 °F (35.7 °C)-98.2 °F (36.8 °C)] 97.5 °F (36.4 °C)  Pulse:  [73-87] 87  Resp:  [16-18] 16  SpO2:  [91 %-98 %] 98 %  BP: (120-157)/(56-76) 157/68     Weight: 78.9 kg (174 lb)  Height: 5' 4" (162.6 cm)  Body mass index is 29.87 kg/m².      Intake/Output Summary (Last 24 hours) at 11/23/2019 0724  Last data filed at 11/22/2019 1730  Gross per 24 hour   Intake 900 ml   Output --   Net 900 ml       Ortho/SPM Exam    AAOx4  NAD  Reg rate  No increased WOB  Dressing with some drainage  WWP extremities  FCDs in place and functioning    Neuro:  5/5 strength C5-T1  SILT C5-T1    Significant Labs:   BMP:   No results for input(s): GLU, NA, K, CL, CO2, BUN, CREATININE, CALCIUM, MG in the last 48 hours.  CBC:   No results for input(s): WBC, HGB, HCT, PLT in the last 48 hours.  All pertinent labs within the past 24 hours have been reviewed.    Significant Imaging: None    Assessment/Plan:     * Cervical myelopathy  59 y.o. female  S/p C3-6 laminectomy and fusion    Pain control: multimodal  PT/OT: WBAT in cervical collar  DVT PPx: early ambulation, FCDs at all times when not ambulating  Abx: postop Ancef completed  Drain: out      Dispo: awaiting SNF approval    Hyperlipidemia  Home meds    Essential hypertension  Home meds    Type 2 diabetes mellitus, without long-term current use of insulin  ARMIDA Choi MD  Orthopedics  [facility]  "

## 2019-11-23 NOTE — ASSESSMENT & PLAN NOTE
59 y.o. female  S/p C3-6 laminectomy and fusion    Pain control: multimodal  PT/OT: WBAT in cervical collar  DVT PPx: early ambulation, FCDs at all times when not ambulating  Abx: postop Ancef completed  Drain: out      Dispo: awaiting SNF approval

## 2019-11-24 LAB
POCT GLUCOSE: 172 MG/DL (ref 70–110)
POCT GLUCOSE: 186 MG/DL (ref 70–110)
POCT GLUCOSE: 197 MG/DL (ref 70–110)
POCT GLUCOSE: 222 MG/DL (ref 70–110)

## 2019-11-24 PROCEDURE — 25000003 PHARM REV CODE 250: Performed by: STUDENT IN AN ORGANIZED HEALTH CARE EDUCATION/TRAINING PROGRAM

## 2019-11-24 PROCEDURE — 97530 THERAPEUTIC ACTIVITIES: CPT

## 2019-11-24 PROCEDURE — 94761 N-INVAS EAR/PLS OXIMETRY MLT: CPT

## 2019-11-24 PROCEDURE — 97116 GAIT TRAINING THERAPY: CPT

## 2019-11-24 PROCEDURE — 63600175 PHARM REV CODE 636 W HCPCS: Performed by: STUDENT IN AN ORGANIZED HEALTH CARE EDUCATION/TRAINING PROGRAM

## 2019-11-24 PROCEDURE — 11000001 HC ACUTE MED/SURG PRIVATE ROOM

## 2019-11-24 RX ADMIN — DOCUSATE SODIUM 100 MG: 100 CAPSULE, LIQUID FILLED ORAL at 09:11

## 2019-11-24 RX ADMIN — OXYCODONE HYDROCHLORIDE 10 MG: 10 TABLET ORAL at 02:11

## 2019-11-24 RX ADMIN — AMLODIPINE BESYLATE 5 MG: 5 TABLET ORAL at 08:11

## 2019-11-24 RX ADMIN — LOSARTAN POTASSIUM 25 MG: 25 TABLET ORAL at 08:11

## 2019-11-24 RX ADMIN — PANTOPRAZOLE SODIUM 40 MG: 40 TABLET, DELAYED RELEASE ORAL at 09:11

## 2019-11-24 RX ADMIN — INSULIN ASPART 1 UNITS: 100 INJECTION, SOLUTION INTRAVENOUS; SUBCUTANEOUS at 09:11

## 2019-11-24 RX ADMIN — POLYETHYLENE GLYCOL 3350 17 G: 17 POWDER, FOR SOLUTION ORAL at 09:11

## 2019-11-24 RX ADMIN — INSULIN ASPART 4 UNITS: 100 INJECTION, SOLUTION INTRAVENOUS; SUBCUTANEOUS at 12:11

## 2019-11-24 RX ADMIN — INSULIN ASPART 2 UNITS: 100 INJECTION, SOLUTION INTRAVENOUS; SUBCUTANEOUS at 04:11

## 2019-11-24 RX ADMIN — OXYCODONE HYDROCHLORIDE 10 MG: 10 TABLET ORAL at 08:11

## 2019-11-24 RX ADMIN — METHOCARBAMOL TABLETS 750 MG: 750 TABLET, COATED ORAL at 09:11

## 2019-11-24 RX ADMIN — ATORVASTATIN CALCIUM 10 MG: 10 TABLET, FILM COATED ORAL at 09:11

## 2019-11-24 RX ADMIN — GABAPENTIN 300 MG: 300 CAPSULE ORAL at 09:11

## 2019-11-24 RX ADMIN — ACETAMINOPHEN 500 MG: 500 TABLET ORAL at 06:11

## 2019-11-24 RX ADMIN — INSULIN ASPART 2 UNITS: 100 INJECTION, SOLUTION INTRAVENOUS; SUBCUTANEOUS at 07:11

## 2019-11-24 RX ADMIN — HEPARIN SODIUM 5000 UNITS: 5000 INJECTION, SOLUTION INTRAVENOUS; SUBCUTANEOUS at 02:11

## 2019-11-24 RX ADMIN — ACETAMINOPHEN 500 MG: 500 TABLET ORAL at 09:11

## 2019-11-24 RX ADMIN — GABAPENTIN 300 MG: 300 CAPSULE ORAL at 02:11

## 2019-11-24 RX ADMIN — HEPARIN SODIUM 5000 UNITS: 5000 INJECTION, SOLUTION INTRAVENOUS; SUBCUTANEOUS at 09:11

## 2019-11-24 RX ADMIN — HEPARIN SODIUM 5000 UNITS: 5000 INJECTION, SOLUTION INTRAVENOUS; SUBCUTANEOUS at 06:11

## 2019-11-24 RX ADMIN — ACETAMINOPHEN 500 MG: 500 TABLET ORAL at 02:11

## 2019-11-24 RX ADMIN — LEVOTHYROXINE SODIUM 50 MCG: 50 TABLET ORAL at 09:11

## 2019-11-24 NOTE — PROGRESS NOTES
[facility]  Orthopedics  Progress Note    Patient Name: Lala Saxena  MRN: [id number]  Admission Date: 11/19/2019  Hospital Length of Stay: 3 days  Attending Provider: Miguel Cruz MD  Primary Care Provider: Edward Hernandez MD  Follow-up For: Procedure(s) (LRB):  FUSION, SPINE, CERVICAL C3-6 (N/A)    Post-Operative Day: 5 Days Post-Op  Subjective:     Principal Problem:Cervical myelopathy    Principal Orthopedic Problem: same    Interval History: Patient seen and examined at bedside.  No acute events overnight.  Pain controlled. Awaiting SNF approval by insurance.     Review of patient's allergies indicates:  No Known Allergies    Current Facility-Administered Medications   Medication    acetaminophen tablet 500 mg    amLODIPine tablet 5 mg    atorvastatin tablet 10 mg    calcium carbonate 200 mg calcium (500 mg) chewable tablet 500 mg    celecoxib capsule 200 mg    dextrose 10% (D10W) Bolus    dextrose 10% (D10W) Bolus    diphenhydrAMINE capsule 25 mg    docusate sodium capsule 100 mg    gabapentin capsule 300 mg    glucagon (human recombinant) injection 1 mg    glucose chewable tablet 16 g    glucose chewable tablet 24 g    heparin (porcine) injection 5,000 Units    HYDROmorphone injection 0.2 mg    HYDROmorphone injection 0.5 mg    insulin aspart U-100 pen 1-10 Units    levothyroxine tablet 50 mcg    losartan tablet 25 mg    melatonin tablet 6 mg    methocarbamol tablet 750 mg    ondansetron tablet 8 mg    oxyCODONE immediate release tablet 10 mg    oxyCODONE immediate release tablet 15 mg    oxyCODONE immediate release tablet 5 mg    pantoprazole EC tablet 40 mg    polyethylene glycol packet 17 g    promethazine tablet 12.5 mg     Objective:     Vital Signs (Most Recent):  Temp: 98.1 °F (36.7 °C) (11/24/19 0540)  Pulse: 96 (11/24/19 0540)  Resp: 18 (11/24/19 0540)  BP: (!) 166/72 (11/24/19 0540)  SpO2: (!) 93 % (11/24/19 0540) Vital Signs (24h Range):  Temp:  " [98 °F (36.7 °C)-98.6 °F (37 °C)] 98.1 °F (36.7 °C)  Pulse:  [88-96] 96  Resp:  [15-18] 18  SpO2:  [93 %-99 %] 93 %  BP: (142-183)/(62-78) 166/72     Weight: 78.9 kg (174 lb)  Height: 5' 4" (162.6 cm)  Body mass index is 29.87 kg/m².      Intake/Output Summary (Last 24 hours) at 11/24/2019 0723  Last data filed at 11/23/2019 1800  Gross per 24 hour   Intake 880 ml   Output --   Net 880 ml       Ortho/SPM Exam    AAOx4  NAD  Reg rate  No increased WOB  Dressing with some drainage  WWP extremities  FCDs in place and functioning    Neuro:  5/5 strength C5-T1  SILT C5-T1    Significant Labs:   none    Significant Imaging: None    Assessment/Plan:     * Cervical myelopathy  59 y.o. female  S/p C3-6 laminectomy and fusion    Pain control: multimodal  PT/OT: WBAT in cervical collar  DVT PPx: early ambulation, FCDs at all times when not ambulating  Abx: postop Ancef completed  Drain: out      Dispo: awaiting SNF approval    Hyperlipidemia  Home meds    Essential hypertension  Home meds    Type 2 diabetes mellitus, without long-term current use of insulin  ARMIDA Choi MD  Orthopedics  [facility]  "

## 2019-11-24 NOTE — SUBJECTIVE & OBJECTIVE
"Principal Problem:Cervical myelopathy    Principal Orthopedic Problem: same    Interval History: Patient seen and examined at bedside.  No acute events overnight.  Pain controlled. Awaiting SNF approval by insurance.     Review of patient's allergies indicates:  No Known Allergies    Current Facility-Administered Medications   Medication    acetaminophen tablet 500 mg    amLODIPine tablet 5 mg    atorvastatin tablet 10 mg    calcium carbonate 200 mg calcium (500 mg) chewable tablet 500 mg    celecoxib capsule 200 mg    dextrose 10% (D10W) Bolus    dextrose 10% (D10W) Bolus    diphenhydrAMINE capsule 25 mg    docusate sodium capsule 100 mg    gabapentin capsule 300 mg    glucagon (human recombinant) injection 1 mg    glucose chewable tablet 16 g    glucose chewable tablet 24 g    heparin (porcine) injection 5,000 Units    HYDROmorphone injection 0.2 mg    HYDROmorphone injection 0.5 mg    insulin aspart U-100 pen 1-10 Units    levothyroxine tablet 50 mcg    losartan tablet 25 mg    melatonin tablet 6 mg    methocarbamol tablet 750 mg    ondansetron tablet 8 mg    oxyCODONE immediate release tablet 10 mg    oxyCODONE immediate release tablet 15 mg    oxyCODONE immediate release tablet 5 mg    pantoprazole EC tablet 40 mg    polyethylene glycol packet 17 g    promethazine tablet 12.5 mg     Objective:     Vital Signs (Most Recent):  Temp: 98.1 °F (36.7 °C) (11/24/19 0540)  Pulse: 96 (11/24/19 0540)  Resp: 18 (11/24/19 0540)  BP: (!) 166/72 (11/24/19 0540)  SpO2: (!) 93 % (11/24/19 0540) Vital Signs (24h Range):  Temp:  [98 °F (36.7 °C)-98.6 °F (37 °C)] 98.1 °F (36.7 °C)  Pulse:  [88-96] 96  Resp:  [15-18] 18  SpO2:  [93 %-99 %] 93 %  BP: (142-183)/(62-78) 166/72     Weight: 78.9 kg (174 lb)  Height: 5' 4" (162.6 cm)  Body mass index is 29.87 kg/m².      Intake/Output Summary (Last 24 hours) at 11/24/2019 0723  Last data filed at 11/23/2019 1800  Gross per 24 hour   Intake 880 ml   Output -- "   Net 880 ml       Ortho/SPM Exam    AAOx4  NAD  Reg rate  No increased WOB  Dressing with some drainage  WWP extremities  FCDs in place and functioning    Neuro:  5/5 strength C5-T1  SILT C5-T1    Significant Labs:   none    Significant Imaging: None

## 2019-11-24 NOTE — PLAN OF CARE
Goals remain appropriate.     Problem: Physical Therapy Goal  Goal: Physical Therapy Goal  Description  Goals to be met by: 2019     Patient will increase functional independence with mobility by performin. Supine to sit with Stand-by Assistance  2. Sit to supine with Stand-by Assistance  3. Sit to stand transfer with Stand-by Assistance  4. Bed to chair transfer with Stand-by Assistance using Rolling Walker  5. Gait  x 50 feet with Stand-by Assistance using Rolling Walker     Outcome: Ongoing, Progressing

## 2019-11-24 NOTE — PLAN OF CARE
Patient AAOX4, call light and belongings in reach, up in chair, family at bedside.  Pain controlled with current regimen when patient takes pain meds, requires continued education regarding prn pain meds and additional pain med regimen.  Patient refused celebrex and robaxin this shift due to she is concerned about increased drowsiness.  Patient tolerating diet with no complaints of N/V.  Patient remains free from falls and safety maintained this shift.  Foam dressing changed behind C Collar below Aquacel which continues to drain serosanguinous fluid.

## 2019-11-24 NOTE — PLAN OF CARE
Patient AAOX4, call light and belongings in reach, siderails up x2, family at bedside, scds on and operating.  Pain controlled with current regimen, tolerating diet with no complaints of N/V.  Voiding with no concerns, no BM this shift.  Surgical site draining, Ortho notified and foam dressing changed.  Patient ambulated hallways with minimal assistance this shift.

## 2019-11-25 LAB
POCT GLUCOSE: 153 MG/DL (ref 70–110)
POCT GLUCOSE: 168 MG/DL (ref 70–110)
POCT GLUCOSE: 183 MG/DL (ref 70–110)
POCT GLUCOSE: 234 MG/DL (ref 70–110)

## 2019-11-25 PROCEDURE — 25000003 PHARM REV CODE 250: Performed by: STUDENT IN AN ORGANIZED HEALTH CARE EDUCATION/TRAINING PROGRAM

## 2019-11-25 PROCEDURE — 97530 THERAPEUTIC ACTIVITIES: CPT

## 2019-11-25 PROCEDURE — 63600175 PHARM REV CODE 636 W HCPCS: Performed by: STUDENT IN AN ORGANIZED HEALTH CARE EDUCATION/TRAINING PROGRAM

## 2019-11-25 PROCEDURE — 11000001 HC ACUTE MED/SURG PRIVATE ROOM

## 2019-11-25 RX ORDER — METHOCARBAMOL 500 MG/1
1000 TABLET, FILM COATED ORAL 3 TIMES DAILY
Status: CANCELLED | OUTPATIENT
Start: 2019-11-25

## 2019-11-25 RX ORDER — LEVOTHYROXINE SODIUM 50 UG/1
50 TABLET ORAL DAILY
Status: CANCELLED | OUTPATIENT
Start: 2019-11-25

## 2019-11-25 RX ORDER — ACETAMINOPHEN 325 MG/1
650 TABLET ORAL EVERY 6 HOURS PRN
Status: CANCELLED | OUTPATIENT
Start: 2019-11-25

## 2019-11-25 RX ORDER — LOSARTAN POTASSIUM 25 MG/1
30 TABLET ORAL DAILY
Status: CANCELLED | OUTPATIENT
Start: 2019-11-25

## 2019-11-25 RX ORDER — DEXTROMETHORPHAN HYDROBROMIDE, GUAIFENESIN 5; 100 MG/5ML; MG/5ML
650 LIQUID ORAL EVERY 8 HOURS
Status: CANCELLED | OUTPATIENT
Start: 2019-11-25

## 2019-11-25 RX ORDER — CALCIUM CARBONATE 200(500)MG
500 TABLET,CHEWABLE ORAL 2 TIMES DAILY PRN
Status: CANCELLED | OUTPATIENT
Start: 2019-11-25

## 2019-11-25 RX ORDER — TALC
6 POWDER (GRAM) TOPICAL NIGHTLY PRN
Status: CANCELLED | OUTPATIENT
Start: 2019-11-25

## 2019-11-25 RX ORDER — AMLODIPINE BESYLATE 5 MG/1
5 TABLET ORAL DAILY
Status: CANCELLED | OUTPATIENT
Start: 2019-11-25

## 2019-11-25 RX ORDER — OXYCODONE HYDROCHLORIDE 5 MG/1
5 TABLET ORAL EVERY 4 HOURS PRN
Status: CANCELLED | OUTPATIENT
Start: 2019-11-25

## 2019-11-25 RX ORDER — AMOXICILLIN 250 MG
1 CAPSULE ORAL 2 TIMES DAILY
Status: CANCELLED | OUTPATIENT
Start: 2019-11-25

## 2019-11-25 RX ORDER — CELECOXIB 200 MG/1
200 CAPSULE ORAL DAILY
Status: CANCELLED | OUTPATIENT
Start: 2019-11-25

## 2019-11-25 RX ORDER — ATORVASTATIN CALCIUM 10 MG/1
10 TABLET, FILM COATED ORAL DAILY
Status: CANCELLED | OUTPATIENT
Start: 2019-11-25

## 2019-11-25 RX ORDER — GABAPENTIN 100 MG/1
100 CAPSULE ORAL 3 TIMES DAILY
Status: CANCELLED | OUTPATIENT
Start: 2019-11-25

## 2019-11-25 RX ADMIN — ATORVASTATIN CALCIUM 10 MG: 10 TABLET, FILM COATED ORAL at 08:11

## 2019-11-25 RX ADMIN — LEVOTHYROXINE SODIUM 50 MCG: 50 TABLET ORAL at 08:11

## 2019-11-25 RX ADMIN — GABAPENTIN 300 MG: 300 CAPSULE ORAL at 08:11

## 2019-11-25 RX ADMIN — METHOCARBAMOL TABLETS 750 MG: 750 TABLET, COATED ORAL at 02:11

## 2019-11-25 RX ADMIN — ACETAMINOPHEN 500 MG: 500 TABLET ORAL at 05:11

## 2019-11-25 RX ADMIN — AMLODIPINE BESYLATE 5 MG: 5 TABLET ORAL at 08:11

## 2019-11-25 RX ADMIN — METHOCARBAMOL TABLETS 750 MG: 750 TABLET, COATED ORAL at 10:11

## 2019-11-25 RX ADMIN — OXYCODONE HYDROCHLORIDE 10 MG: 10 TABLET ORAL at 06:11

## 2019-11-25 RX ADMIN — HEPARIN SODIUM 5000 UNITS: 5000 INJECTION, SOLUTION INTRAVENOUS; SUBCUTANEOUS at 05:11

## 2019-11-25 RX ADMIN — ACETAMINOPHEN 500 MG: 500 TABLET ORAL at 02:11

## 2019-11-25 RX ADMIN — METHOCARBAMOL TABLETS 750 MG: 750 TABLET, COATED ORAL at 08:11

## 2019-11-25 RX ADMIN — OXYCODONE HYDROCHLORIDE 10 MG: 10 TABLET ORAL at 07:11

## 2019-11-25 RX ADMIN — LOSARTAN POTASSIUM 25 MG: 25 TABLET ORAL at 08:11

## 2019-11-25 RX ADMIN — GABAPENTIN 300 MG: 300 CAPSULE ORAL at 10:11

## 2019-11-25 RX ADMIN — DOCUSATE SODIUM 100 MG: 100 CAPSULE, LIQUID FILLED ORAL at 08:11

## 2019-11-25 RX ADMIN — HEPARIN SODIUM 5000 UNITS: 5000 INJECTION, SOLUTION INTRAVENOUS; SUBCUTANEOUS at 10:11

## 2019-11-25 RX ADMIN — DOCUSATE SODIUM 100 MG: 100 CAPSULE, LIQUID FILLED ORAL at 10:11

## 2019-11-25 RX ADMIN — INSULIN ASPART 1 UNITS: 100 INJECTION, SOLUTION INTRAVENOUS; SUBCUTANEOUS at 10:11

## 2019-11-25 RX ADMIN — GABAPENTIN 300 MG: 300 CAPSULE ORAL at 02:11

## 2019-11-25 RX ADMIN — INSULIN ASPART 4 UNITS: 100 INJECTION, SOLUTION INTRAVENOUS; SUBCUTANEOUS at 12:11

## 2019-11-25 RX ADMIN — OXYCODONE HYDROCHLORIDE 10 MG: 10 TABLET ORAL at 02:11

## 2019-11-25 RX ADMIN — INSULIN ASPART 2 UNITS: 100 INJECTION, SOLUTION INTRAVENOUS; SUBCUTANEOUS at 08:11

## 2019-11-25 RX ADMIN — POLYETHYLENE GLYCOL 3350 17 G: 17 POWDER, FOR SOLUTION ORAL at 08:11

## 2019-11-25 RX ADMIN — CELECOXIB 200 MG: 200 CAPSULE ORAL at 08:11

## 2019-11-25 RX ADMIN — HEPARIN SODIUM 5000 UNITS: 5000 INJECTION, SOLUTION INTRAVENOUS; SUBCUTANEOUS at 02:11

## 2019-11-25 RX ADMIN — ACETAMINOPHEN 500 MG: 500 TABLET ORAL at 10:11

## 2019-11-25 RX ADMIN — ONDANSETRON HYDROCHLORIDE 8 MG: 4 TABLET, FILM COATED ORAL at 04:11

## 2019-11-25 RX ADMIN — PANTOPRAZOLE SODIUM 40 MG: 40 TABLET, DELAYED RELEASE ORAL at 08:11

## 2019-11-25 RX ADMIN — INSULIN ASPART 2 UNITS: 100 INJECTION, SOLUTION INTRAVENOUS; SUBCUTANEOUS at 05:11

## 2019-11-25 NOTE — PLAN OF CARE
Patient resting in bed comfortably. iv intact and infusing free of infection and irration,  fall precautions maintained no falls noted. Pt has c collar on, wound vac in place,.Call light in reach bed locked and in lowest position. Non skid socks on while out of bed. Patient instructed to call for assistance. Skin integrity maintained as patient is independent with frequent positioning, C/o pain managed with PRN meds, No other complaints or concerns. Progressing towards goals. Will continue to monitor and follow careplan of care.

## 2019-11-25 NOTE — PLAN OF CARE
Plan of care reviewed with patient; verbalized understanding.   Medications reviewed and administered as ordered.  Rounding for safety and patient care per policy.   Safety precautions maintained.   Call light within reach, bed wheels locked, bed in lowest position, side rails ^x2, safety maintained. NADN, Will continue monitor.        Problem: Adult Inpatient Plan of Care  Goal: Plan of Care Review  Flowsheets (Taken 11/25/2019 0448)  Plan of Care Reviewed With: patient     Problem: Fall Injury Risk  Goal: Absence of Fall and Fall-Related Injury  Intervention: Promote Injury-Free Environment  Flowsheets (Taken 11/25/2019 0448)  Safety Promotion/Fall Prevention: assistive device/personal item within reach; commode/urinal/bedpan at bedside; lighting adjusted; medications reviewed; nonskid shoes/socks when out of bed; side rails raised x 2; supervised activity; toileting scheduled; instructed to call staff for mobility  Environmental Safety Modification: assistive device/personal items within reach; clutter free environment maintained; lighting adjusted

## 2019-11-25 NOTE — PROGRESS NOTES
[facility]  Orthopedics  Progress Note    Patient Name: Lala Saxena  MRN: [id number]  Admission Date: 11/19/2019  Hospital Length of Stay: 4 days  Attending Provider: Miguel Cruz MD  Primary Care Provider: Edward Hernandez MD  Follow-up For: Procedure(s) (LRB):  FUSION, SPINE, CERVICAL C3-6 (N/A)    Post-Operative Day: 6 Days Post-Op  Subjective:     Principal Problem:Cervical myelopathy    Principal Orthopedic Problem: same    Interval History: Patient seen and examined at bedside.  No acute events overnight.  Pain controlled. Strength in arms improving.     Review of patient's allergies indicates:  No Known Allergies    Current Facility-Administered Medications   Medication    acetaminophen tablet 500 mg    amLODIPine tablet 5 mg    atorvastatin tablet 10 mg    calcium carbonate 200 mg calcium (500 mg) chewable tablet 500 mg    celecoxib capsule 200 mg    dextrose 10% (D10W) Bolus    dextrose 10% (D10W) Bolus    diphenhydrAMINE capsule 25 mg    docusate sodium capsule 100 mg    gabapentin capsule 300 mg    glucagon (human recombinant) injection 1 mg    glucose chewable tablet 16 g    glucose chewable tablet 24 g    heparin (porcine) injection 5,000 Units    HYDROmorphone injection 0.2 mg    HYDROmorphone injection 0.5 mg    insulin aspart U-100 pen 1-10 Units    levothyroxine tablet 50 mcg    losartan tablet 25 mg    melatonin tablet 6 mg    methocarbamol tablet 750 mg    ondansetron tablet 8 mg    oxyCODONE immediate release tablet 10 mg    oxyCODONE immediate release tablet 15 mg    oxyCODONE immediate release tablet 5 mg    pantoprazole EC tablet 40 mg    polyethylene glycol packet 17 g    promethazine tablet 12.5 mg     Objective:     Vital Signs (Most Recent):  Temp: 98 °F (36.7 °C) (11/25/19 0427)  Pulse: 86 (11/25/19 0427)  Resp: 18 (11/25/19 0427)  BP: (!) 166/72 (11/25/19 0427)  SpO2: (!) 94 % (11/25/19 0427) Vital Signs (24h Range):  Temp:  [97.3 °F  "(36.3 °C)-98.7 °F (37.1 °C)] 98 °F (36.7 °C)  Pulse:  [81-90] 86  Resp:  [16-18] 18  SpO2:  [94 %-96 %] 94 %  BP: (129-194)/(60-86) 166/72     Weight: 78.9 kg (174 lb)  Height: 5' 4" (162.6 cm)  Body mass index is 29.87 kg/m².      Intake/Output Summary (Last 24 hours) at 11/25/2019 0614  Last data filed at 11/24/2019 1800  Gross per 24 hour   Intake 920 ml   Output --   Net 920 ml       Ortho/SPM Exam    AAOx4  NAD  Reg rate  No increased WOB  Dressing with some drainage  WWP extremities  FCDs in place and functioning    Neuro:  5/5 strength C5-T1  SILT C5-T1    Significant Labs:   none    Significant Imaging: None    Assessment/Plan:     * Cervical myelopathy  59 y.o. female  S/p C3-6 laminectomy and fusion    Pain control: multimodal  PT/OT: WBAT in cervical collar  DVT PPx: early ambulation, FCDs at all times when not ambulating  Abx: postop Ancef completed  Drain: out      Dispo: awaiting SNF approval    Hyperlipidemia  Home meds    Essential hypertension  Home meds    Type 2 diabetes mellitus, without long-term current use of insulin  SSI          Derek Lopez MD  Orthopedics  [facility]  "

## 2019-11-25 NOTE — PLAN OF CARE
[facility] has received auth but no bed available today - facility will have a bed tmw therefore she can trnasfer tmw.

## 2019-11-25 NOTE — PT/OT/SLP PROGRESS
"Occupational Therapy   Treatment    Name: Lala Saxena  MRN: [id number]  Admitting Diagnosis:  Cervical myelopathy  6 Days Post-Op    Recommendations:     Discharge Recommendations: nursing facility, skilled  Discharge Equipment Recommendations:  none  Barriers to discharge:  Decreased caregiver support    Assessment:     Lala Saxena is a 59 y.o. female with a medical diagnosis of Cervical myelopathy.  She presents with the following Performance deficits affecting function are weakness, impaired endurance, impaired self care skills, gait instability, impaired functional mobilty, impaired balance, decreased coordination, decreased lower extremity function, decreased upper extremity function, decreased safety awareness, pain.     Pt agreeable to therapy and tolerated session well. Pt with c/o of pain when transitioning from supine<>sit<>supine. Pt. Educated on "log roll" technique for entering/exiting bed with minimal pain. Pt. Verbalized understanding. Pt. Ambulated ~30ft x2 with RW to increase activity tolerance required for adls and functional mobility. Pt required Min A during t/f and ambulation due to decrease balance and Min A. Pt required 4 standing rest breaks during ambulation due to fatigue and pain. Continue OT POC.    Rehab Prognosis:  Good; patient would benefit from acute skilled OT services to address these deficits and reach maximum level of function.       Plan:     Patient to be seen 5 x/week to address the above listed problems via self-care/home management, therapeutic activities, therapeutic exercises  · Plan of Care Expires: 12/20/19  · Plan of Care Reviewed with: patient    Subjective     Pain/Comfort:  · Pain Rating 1: 3/10  · Location - Side 1: Right  · Location 1: neck  · Pain Addressed 1: Reposition, Distraction    Objective:     Communicated with: RN prior to session.  Patient found supine with cervical collar upon OT entry to room.    General Precautions: Standard, fall   Orthopedic " "Precautions:spinal precautions   Braces: Aspen collar     Occupational Performance:     Bed Mobility:    · Patient completed Supine to Sit with minimum assistance     Functional Mobility/Transfers:  · Patient completed Sit <> Stand Transfer with minimum assistance  with  rolling walker   · Patient completed Bed <> Chair Transfer using Step Transfer technique with minimum assistance with rolling walker  · Functional Mobility: Pt agreeable to therapy and tolerated session well. Pt with c/o of pain when transitioning from supine<>sit<>supine. Pt. Educated on "log roll" technique for entering/exiting bed with minimal pain. Pt. Verbalized understanding. Pt. Ambulated ~30ft x2 with RW to increase activity tolerance required for adls and functional mobility. Pt required Min A during t/f and ambulation due to decrease balance and Min A. Pt required 4 standing rest breaks during ambulation due to fatigue and pain.     Activities of Daily Living:  · Not tested      [facility] 6 Click ADL: 14    Treatment & Education:  - OT/POC-  - Importance of mobility to maximize recovery.  - Educated pt. On "log rolling" technique for transitioning supine<>sit  - safety w/ functional mobility; hand placement to ensure safe transfers to various surfaces in prep for ADLs  - Reviewed gait sequence and RW management via verbalization and demonstration   - encouraged to ambulate within household environment at least every hour to hour 1/2      Patient left up in chair with all lines intact, call button in reach and RN notifiedEducation:      GOALS:   Multidisciplinary Problems     Occupational Therapy Goals        Problem: Occupational Therapy Goal    Goal Priority Disciplines Outcome Interventions   Occupational Therapy Goal     OT, PT/OT Ongoing, Progressing    Description:  Goals to be met by: 12/20/19     Patient will increase functional independence with ADLs by performing:    UE Dressing with Stand-by Assistance.  LE Dressing with Minimal " Assistance.  Grooming while standing with Stand-by Assistance.  Toileting from bedside commode with Stand-by Assistance for hygiene and clothing management.   Toilet transfer to bedside commode with Stand-by Assistance.                      Time Tracking:     OT Date of Treatment: 11/25/19  OT Start Time: 0734  OT Stop Time: 0753  OT Total Time (min): 19 min    Billable Minutes:Therapeutic Activity 19    Bettie Krishnamurthy OT  11/25/2019

## 2019-11-25 NOTE — PLAN OF CARE
"Pt agreeable to therapy and tolerated session well. Pt with c/o of pain when transitioning from supine<>sit<>supine. Pt. Educated on "log roll" technique for entering/exiting bed with minimal pain. Pt. Verbalized understanding. Pt. Ambulated ~30ft x2 with RW to increase activity tolerance required for adls and functional mobility. Pt required Min A during t/f and ambulation due to decrease balance and Min A. Pt required 4 standing rest breaks during ambulation due to fatigue and pain. Continue OT POC.      Problem: Occupational Therapy Goal  Goal: Occupational Therapy Goal  Description  Goals to be met by: 12/20/19     Patient will increase functional independence with ADLs by performing:    UE Dressing with Stand-by Assistance.  LE Dressing with Minimal Assistance.  Grooming while standing with Stand-by Assistance.  Toileting from bedside commode with Stand-by Assistance for hygiene and clothing management.   Toilet transfer to bedside commode with Stand-by Assistance.     Outcome: Ongoing, Progressing     "

## 2019-11-25 NOTE — SUBJECTIVE & OBJECTIVE
"Principal Problem:Cervical myelopathy    Principal Orthopedic Problem: same    Interval History: Patient seen and examined at bedside.  No acute events overnight.  Pain controlled. Strength in arms improving.     Review of patient's allergies indicates:  No Known Allergies    Current Facility-Administered Medications   Medication    acetaminophen tablet 500 mg    amLODIPine tablet 5 mg    atorvastatin tablet 10 mg    calcium carbonate 200 mg calcium (500 mg) chewable tablet 500 mg    celecoxib capsule 200 mg    dextrose 10% (D10W) Bolus    dextrose 10% (D10W) Bolus    diphenhydrAMINE capsule 25 mg    docusate sodium capsule 100 mg    gabapentin capsule 300 mg    glucagon (human recombinant) injection 1 mg    glucose chewable tablet 16 g    glucose chewable tablet 24 g    heparin (porcine) injection 5,000 Units    HYDROmorphone injection 0.2 mg    HYDROmorphone injection 0.5 mg    insulin aspart U-100 pen 1-10 Units    levothyroxine tablet 50 mcg    losartan tablet 25 mg    melatonin tablet 6 mg    methocarbamol tablet 750 mg    ondansetron tablet 8 mg    oxyCODONE immediate release tablet 10 mg    oxyCODONE immediate release tablet 15 mg    oxyCODONE immediate release tablet 5 mg    pantoprazole EC tablet 40 mg    polyethylene glycol packet 17 g    promethazine tablet 12.5 mg     Objective:     Vital Signs (Most Recent):  Temp: 98 °F (36.7 °C) (11/25/19 0427)  Pulse: 86 (11/25/19 0427)  Resp: 18 (11/25/19 0427)  BP: (!) 166/72 (11/25/19 0427)  SpO2: (!) 94 % (11/25/19 0427) Vital Signs (24h Range):  Temp:  [97.3 °F (36.3 °C)-98.7 °F (37.1 °C)] 98 °F (36.7 °C)  Pulse:  [81-90] 86  Resp:  [16-18] 18  SpO2:  [94 %-96 %] 94 %  BP: (129-194)/(60-86) 166/72     Weight: 78.9 kg (174 lb)  Height: 5' 4" (162.6 cm)  Body mass index is 29.87 kg/m².      Intake/Output Summary (Last 24 hours) at 11/25/2019 0614  Last data filed at 11/24/2019 1800  Gross per 24 hour   Intake 920 ml   Output --   Net " 920 ml       Ortho/SPM Exam    AAOx4  NAD  Reg rate  No increased WOB  Dressing with some drainage  WWP extremities  FCDs in place and functioning    Neuro:  5/5 strength C5-T1  SILT C5-T1    Significant Labs:   none    Significant Imaging: None

## 2019-11-26 ENCOUNTER — HOSPITAL ENCOUNTER (INPATIENT)
Facility: HOSPITAL | Age: 59
LOS: 7 days | Discharge: HOME HEALTH | DRG: 552 | End: 2019-12-03
Attending: INTERNAL MEDICINE | Admitting: INTERNAL MEDICINE
Payer: COMMERCIAL

## 2019-11-26 VITALS
WEIGHT: 174 LBS | RESPIRATION RATE: 16 BRPM | HEIGHT: 64 IN | HEART RATE: 80 BPM | TEMPERATURE: 99 F | SYSTOLIC BLOOD PRESSURE: 140 MMHG | BODY MASS INDEX: 29.71 KG/M2 | DIASTOLIC BLOOD PRESSURE: 63 MMHG | OXYGEN SATURATION: 95 %

## 2019-11-26 DIAGNOSIS — M43.22 FUSION OF SPINE OF CERVICAL REGION: Primary | ICD-10-CM

## 2019-11-26 DIAGNOSIS — G95.9 CERVICAL MYELOPATHY: ICD-10-CM

## 2019-11-26 LAB
POCT GLUCOSE: 175 MG/DL (ref 70–110)
POCT GLUCOSE: 234 MG/DL (ref 70–110)
POCT GLUCOSE: 245 MG/DL (ref 70–110)
POCT GLUCOSE: 265 MG/DL (ref 70–110)

## 2019-11-26 PROCEDURE — 97535 SELF CARE MNGMENT TRAINING: CPT

## 2019-11-26 PROCEDURE — 25000003 PHARM REV CODE 250: Performed by: STUDENT IN AN ORGANIZED HEALTH CARE EDUCATION/TRAINING PROGRAM

## 2019-11-26 PROCEDURE — 11000004 HC SNF PRIVATE

## 2019-11-26 PROCEDURE — 97530 THERAPEUTIC ACTIVITIES: CPT

## 2019-11-26 PROCEDURE — 99306 1ST NF CARE HIGH MDM 50: CPT | Mod: ,,, | Performed by: INTERNAL MEDICINE

## 2019-11-26 PROCEDURE — 63600175 PHARM REV CODE 636 W HCPCS: Performed by: STUDENT IN AN ORGANIZED HEALTH CARE EDUCATION/TRAINING PROGRAM

## 2019-11-26 PROCEDURE — 25000003 PHARM REV CODE 250: Performed by: INTERNAL MEDICINE

## 2019-11-26 PROCEDURE — 99306 PR NURSING FACILITY CARE, INIT, HIGH SEVERITY: ICD-10-PCS | Mod: ,,, | Performed by: INTERNAL MEDICINE

## 2019-11-26 RX ORDER — IBUPROFEN 200 MG
16 TABLET ORAL
Status: DISCONTINUED | OUTPATIENT
Start: 2019-11-26 | End: 2019-11-26

## 2019-11-26 RX ORDER — ACETAMINOPHEN 325 MG/1
650 TABLET ORAL EVERY 6 HOURS PRN
Status: DISCONTINUED | OUTPATIENT
Start: 2019-11-26 | End: 2019-12-03 | Stop reason: HOSPADM

## 2019-11-26 RX ORDER — GABAPENTIN 100 MG/1
100 CAPSULE ORAL 3 TIMES DAILY
Status: DISCONTINUED | OUTPATIENT
Start: 2019-11-26 | End: 2019-12-03 | Stop reason: HOSPADM

## 2019-11-26 RX ORDER — IBUPROFEN 200 MG
24 TABLET ORAL
Status: DISCONTINUED | OUTPATIENT
Start: 2019-11-26 | End: 2019-11-26

## 2019-11-26 RX ORDER — CELECOXIB 100 MG/1
200 CAPSULE ORAL DAILY
Status: DISCONTINUED | OUTPATIENT
Start: 2019-11-26 | End: 2019-12-03 | Stop reason: HOSPADM

## 2019-11-26 RX ORDER — LEVOTHYROXINE SODIUM 50 UG/1
50 TABLET ORAL
Status: DISCONTINUED | OUTPATIENT
Start: 2019-11-27 | End: 2019-12-03 | Stop reason: HOSPADM

## 2019-11-26 RX ORDER — METHOCARBAMOL 500 MG/1
1000 TABLET, FILM COATED ORAL 3 TIMES DAILY
Status: DISCONTINUED | OUTPATIENT
Start: 2019-11-26 | End: 2019-12-03 | Stop reason: HOSPADM

## 2019-11-26 RX ORDER — GLUCAGON 1 MG
1 KIT INJECTION
Status: DISCONTINUED | OUTPATIENT
Start: 2019-11-26 | End: 2019-11-26

## 2019-11-26 RX ORDER — AMOXICILLIN 250 MG
1 CAPSULE ORAL 2 TIMES DAILY
Status: DISCONTINUED | OUTPATIENT
Start: 2019-11-26 | End: 2019-12-02

## 2019-11-26 RX ORDER — GLIMEPIRIDE 1 MG/1
4 TABLET ORAL
Status: DISCONTINUED | OUTPATIENT
Start: 2019-11-27 | End: 2019-12-03 | Stop reason: HOSPADM

## 2019-11-26 RX ORDER — TALC
6 POWDER (GRAM) TOPICAL NIGHTLY PRN
Status: DISCONTINUED | OUTPATIENT
Start: 2019-11-26 | End: 2019-12-03 | Stop reason: HOSPADM

## 2019-11-26 RX ORDER — LOSARTAN POTASSIUM 25 MG/1
25 TABLET ORAL DAILY
Status: DISCONTINUED | OUTPATIENT
Start: 2019-11-26 | End: 2019-12-02

## 2019-11-26 RX ORDER — OXYCODONE HYDROCHLORIDE 5 MG/1
5 TABLET ORAL EVERY 4 HOURS PRN
Status: DISCONTINUED | OUTPATIENT
Start: 2019-11-26 | End: 2019-12-03 | Stop reason: HOSPADM

## 2019-11-26 RX ORDER — AMLODIPINE BESYLATE 5 MG/1
5 TABLET ORAL DAILY
Status: DISCONTINUED | OUTPATIENT
Start: 2019-11-26 | End: 2019-12-03 | Stop reason: HOSPADM

## 2019-11-26 RX ORDER — METFORMIN HYDROCHLORIDE 500 MG/1
1000 TABLET ORAL 2 TIMES DAILY WITH MEALS
Status: DISCONTINUED | OUTPATIENT
Start: 2019-11-27 | End: 2019-12-03 | Stop reason: HOSPADM

## 2019-11-26 RX ORDER — CALCIUM CARBONATE 200(500)MG
500 TABLET,CHEWABLE ORAL 2 TIMES DAILY PRN
Status: DISCONTINUED | OUTPATIENT
Start: 2019-11-26 | End: 2019-12-03 | Stop reason: HOSPADM

## 2019-11-26 RX ORDER — DEXTROMETHORPHAN HYDROBROMIDE, GUAIFENESIN 5; 100 MG/5ML; MG/5ML
650 LIQUID ORAL EVERY 8 HOURS
Status: DISCONTINUED | OUTPATIENT
Start: 2019-11-26 | End: 2019-11-26

## 2019-11-26 RX ORDER — ATORVASTATIN CALCIUM 10 MG/1
10 TABLET, FILM COATED ORAL DAILY
Status: DISCONTINUED | OUTPATIENT
Start: 2019-11-26 | End: 2019-12-03 | Stop reason: HOSPADM

## 2019-11-26 RX ORDER — ACETAMINOPHEN 325 MG/1
650 TABLET ORAL EVERY 8 HOURS
Status: DISCONTINUED | OUTPATIENT
Start: 2019-11-26 | End: 2019-12-03 | Stop reason: HOSPADM

## 2019-11-26 RX ORDER — INSULIN ASPART 100 [IU]/ML
0-5 INJECTION, SOLUTION INTRAVENOUS; SUBCUTANEOUS
Status: DISCONTINUED | OUTPATIENT
Start: 2019-11-26 | End: 2019-11-26

## 2019-11-26 RX ADMIN — ACETAMINOPHEN 500 MG: 500 TABLET ORAL at 05:11

## 2019-11-26 RX ADMIN — OXYCODONE HYDROCHLORIDE 5 MG: 10 TABLET ORAL at 12:11

## 2019-11-26 RX ADMIN — CELECOXIB 200 MG: 200 CAPSULE ORAL at 08:11

## 2019-11-26 RX ADMIN — GABAPENTIN 100 MG: 100 CAPSULE ORAL at 02:11

## 2019-11-26 RX ADMIN — OXYCODONE HYDROCHLORIDE 5 MG: 10 TABLET ORAL at 08:11

## 2019-11-26 RX ADMIN — AMLODIPINE BESYLATE 5 MG: 5 TABLET ORAL at 08:11

## 2019-11-26 RX ADMIN — HEPARIN SODIUM 5000 UNITS: 5000 INJECTION, SOLUTION INTRAVENOUS; SUBCUTANEOUS at 05:11

## 2019-11-26 RX ADMIN — METHOCARBAMOL TABLETS 1000 MG: 500 TABLET, COATED ORAL at 02:11

## 2019-11-26 RX ADMIN — ACETAMINOPHEN 650 MG: 325 TABLET ORAL at 02:11

## 2019-11-26 RX ADMIN — DOCUSATE SODIUM 100 MG: 100 CAPSULE, LIQUID FILLED ORAL at 08:11

## 2019-11-26 RX ADMIN — GABAPENTIN 100 MG: 100 CAPSULE ORAL at 08:11

## 2019-11-26 RX ADMIN — GABAPENTIN 300 MG: 300 CAPSULE ORAL at 08:11

## 2019-11-26 RX ADMIN — METHOCARBAMOL TABLETS 750 MG: 750 TABLET, COATED ORAL at 08:11

## 2019-11-26 RX ADMIN — PANTOPRAZOLE SODIUM 40 MG: 40 TABLET, DELAYED RELEASE ORAL at 08:11

## 2019-11-26 RX ADMIN — ATORVASTATIN CALCIUM 10 MG: 10 TABLET, FILM COATED ORAL at 08:11

## 2019-11-26 RX ADMIN — ACETAMINOPHEN 650 MG: 325 TABLET ORAL at 10:11

## 2019-11-26 RX ADMIN — POLYETHYLENE GLYCOL 3350 17 G: 17 POWDER, FOR SOLUTION ORAL at 08:11

## 2019-11-26 RX ADMIN — METHOCARBAMOL TABLETS 1000 MG: 500 TABLET, COATED ORAL at 08:11

## 2019-11-26 RX ADMIN — LOSARTAN POTASSIUM 25 MG: 25 TABLET ORAL at 08:11

## 2019-11-26 RX ADMIN — OXYCODONE HYDROCHLORIDE 10 MG: 10 TABLET ORAL at 05:11

## 2019-11-26 RX ADMIN — INSULIN ASPART 2 UNITS: 100 INJECTION, SOLUTION INTRAVENOUS; SUBCUTANEOUS at 08:11

## 2019-11-26 RX ADMIN — SENNOSIDES AND DOCUSATE SODIUM 1 TABLET: 8.6; 5 TABLET ORAL at 08:11

## 2019-11-26 RX ADMIN — LEVOTHYROXINE SODIUM 50 MCG: 50 TABLET ORAL at 08:11

## 2019-11-26 NOTE — SUBJECTIVE & OBJECTIVE
"Principal Problem:Cervical myelopathy    Principal Orthopedic Problem: same    Interval History: Patient seen and examined at bedside.  No acute events overnight.  Pain controlled. Incisional wound vac applied yesterday.    Review of patient's allergies indicates:  No Known Allergies    Current Facility-Administered Medications   Medication    acetaminophen tablet 500 mg    amLODIPine tablet 5 mg    atorvastatin tablet 10 mg    calcium carbonate 200 mg calcium (500 mg) chewable tablet 500 mg    celecoxib capsule 200 mg    dextrose 10% (D10W) Bolus    dextrose 10% (D10W) Bolus    diphenhydrAMINE capsule 25 mg    docusate sodium capsule 100 mg    gabapentin capsule 300 mg    glucagon (human recombinant) injection 1 mg    glucose chewable tablet 16 g    glucose chewable tablet 24 g    heparin (porcine) injection 5,000 Units    HYDROmorphone injection 0.2 mg    HYDROmorphone injection 0.5 mg    insulin aspart U-100 pen 1-10 Units    levothyroxine tablet 50 mcg    losartan tablet 25 mg    melatonin tablet 6 mg    methocarbamol tablet 750 mg    ondansetron tablet 8 mg    oxyCODONE immediate release tablet 10 mg    oxyCODONE immediate release tablet 15 mg    oxyCODONE immediate release tablet 5 mg    pantoprazole EC tablet 40 mg    polyethylene glycol packet 17 g    promethazine tablet 12.5 mg     Objective:     Vital Signs (Most Recent):  Temp: 97.9 °F (36.6 °C) (11/26/19 0444)  Pulse: 88 (11/26/19 0444)  Resp: 18 (11/26/19 0444)  BP: (!) 143/75 (11/26/19 0444)  SpO2: 96 % (11/26/19 0444) Vital Signs (24h Range):  Temp:  [97.7 °F (36.5 °C)-98.5 °F (36.9 °C)] 97.9 °F (36.6 °C)  Pulse:  [77-88] 88  Resp:  [16-18] 18  SpO2:  [91 %-98 %] 96 %  BP: (127-152)/(63-75) 143/75     Weight: 78.9 kg (174 lb)  Height: 5' 4" (162.6 cm)  Body mass index is 29.87 kg/m².      Intake/Output Summary (Last 24 hours) at 11/26/2019 0550  Last data filed at 11/25/2019 1723  Gross per 24 hour   Intake 1050 ml   Output " --   Net 1050 ml       Ortho/SPM Exam    AAOx4  NAD  Reg rate  No increased WOB  Dressing with some drainage  WWP extremities  FCDs in place and functioning    Neuro:  5/5 strength C5-T1  SILT C5-T1    Significant Labs:   none    Significant Imaging: None

## 2019-11-26 NOTE — PLAN OF CARE
Pt has had auth for SNF but there has not been an available bed. CM received update today that pt can transfer to [facility] room 304 today. CM updated MD and pt is ready for transfer. CM setup w/c van transport via [facility] - requested pickup time is 10:30am. Requested pickup time is not a guarantee of actual pickup time. Pt's nurse can call report to [phone]. CM updated pt's nurse on d/c plans.     11/26/19 1721   Final Note   Assessment Type Final Discharge Note   Anticipated Discharge Disposition SNF   Right Care Referral Info   Post Acute Recommendation SNF / Sub-Acute Rehab   Facility Name [facility]

## 2019-11-26 NOTE — ASSESSMENT & PLAN NOTE
Held insulin ordered till bmp results are back.  Receiving RN notified and asked to verify order with doc regarding insulin according to BMP results.     Home meds

## 2019-11-26 NOTE — PROGRESS NOTES
[facility]  Orthopedics  Progress Note    Patient Name: Lala aSxena  MRN: [id number]  Admission Date: 11/19/2019  Hospital Length of Stay: 5 days  Attending Provider: Miguel Cruz MD  Primary Care Provider: Edward Hernandez MD  Follow-up For: Procedure(s) (LRB):  FUSION, SPINE, CERVICAL C3-6 (N/A)    Post-Operative Day: 7 Days Post-Op  Subjective:     Principal Problem:Cervical myelopathy    Principal Orthopedic Problem: same    Interval History: Patient seen and examined at bedside.  No acute events overnight.  Pain controlled. Incisional wound vac applied yesterday.    Review of patient's allergies indicates:  No Known Allergies    Current Facility-Administered Medications   Medication    acetaminophen tablet 500 mg    amLODIPine tablet 5 mg    atorvastatin tablet 10 mg    calcium carbonate 200 mg calcium (500 mg) chewable tablet 500 mg    celecoxib capsule 200 mg    dextrose 10% (D10W) Bolus    dextrose 10% (D10W) Bolus    diphenhydrAMINE capsule 25 mg    docusate sodium capsule 100 mg    gabapentin capsule 300 mg    glucagon (human recombinant) injection 1 mg    glucose chewable tablet 16 g    glucose chewable tablet 24 g    heparin (porcine) injection 5,000 Units    HYDROmorphone injection 0.2 mg    HYDROmorphone injection 0.5 mg    insulin aspart U-100 pen 1-10 Units    levothyroxine tablet 50 mcg    losartan tablet 25 mg    melatonin tablet 6 mg    methocarbamol tablet 750 mg    ondansetron tablet 8 mg    oxyCODONE immediate release tablet 10 mg    oxyCODONE immediate release tablet 15 mg    oxyCODONE immediate release tablet 5 mg    pantoprazole EC tablet 40 mg    polyethylene glycol packet 17 g    promethazine tablet 12.5 mg     Objective:     Vital Signs (Most Recent):  Temp: 97.9 °F (36.6 °C) (11/26/19 0444)  Pulse: 88 (11/26/19 0444)  Resp: 18 (11/26/19 0444)  BP: (!) 143/75 (11/26/19 0444)  SpO2: 96 % (11/26/19 0444) Vital Signs (24h Range):  Temp:   "[97.7 °F (36.5 °C)-98.5 °F (36.9 °C)] 97.9 °F (36.6 °C)  Pulse:  [77-88] 88  Resp:  [16-18] 18  SpO2:  [91 %-98 %] 96 %  BP: (127-152)/(63-75) 143/75     Weight: 78.9 kg (174 lb)  Height: 5' 4" (162.6 cm)  Body mass index is 29.87 kg/m².      Intake/Output Summary (Last 24 hours) at 11/26/2019 0550  Last data filed at 11/25/2019 1723  Gross per 24 hour   Intake 1050 ml   Output --   Net 1050 ml       Ortho/SPM Exam    AAOx4  NAD  Reg rate  No increased WOB  Dressing with some drainage  WWP extremities  FCDs in place and functioning    Neuro:  5/5 strength C5-T1  SILT C5-T1    Significant Labs:   none    Significant Imaging: None    Assessment/Plan:     * Cervical myelopathy  59 y.o. female  S/p C3-6 laminectomy and fusion    Pain control: multimodal  PT/OT: WBAT in cervical collar  DVT PPx: early ambulation, FCDs at all times when not ambulating  Abx: postop Ancef completed  Drain: out  Incisional wound vac in place    Dispo: awaiting SNF bed, likely transfer today    Hyperlipidemia  Home meds    Essential hypertension  Home meds    Type 2 diabetes mellitus, without long-term current use of insulin  SSI          Derek Lopez MD  Orthopedics  [facility]  "

## 2019-11-26 NOTE — NURSING
Pt requested that remains 500 mg of Robaxin, 325 mg of Tylenol, and 100 mg of Gabapentin be administered at this time. Pt dropped Robaxin; so another 500 mg removed from pyxis and administered.

## 2019-11-26 NOTE — PLAN OF CARE
Pt motivated for therapy and tolerated session well. Pt. With c/o of dizziness upon standing that faded with ambulation. Pt. Ambulated household distances CGA -Min A with RW to complete grooming tasks while standing at sink. Pt is progressing toward goals but continues to demonstrate decreased balance when transferring and during ambulation. Continue OT POC.      Problem: Occupational Therapy Goal  Goal: Occupational Therapy Goal  Description  Goals to be met by: 12/20/19     Patient will increase functional independence with ADLs by performing:    UE Dressing with Stand-by Assistance.  LE Dressing with Minimal Assistance.  Grooming while standing with Stand-by Assistance. - Met  Toileting from bedside commode with Stand-by Assistance for hygiene and clothing management. - Met  Toilet transfer to bedside commode with Stand-by Assistance.     Outcome: Ongoing, Progressing

## 2019-11-26 NOTE — ASSESSMENT & PLAN NOTE
59 y.o. female  S/p C3-6 laminectomy and fusion    Pain control: multimodal  PT/OT: WBAT in cervical collar  DVT PPx: early ambulation, FCDs at all times when not ambulating  Abx: postop Ancef completed  Drain: out  Incisional wound vac in place    Dispo: awaiting SNF bed, likely transfer today

## 2019-11-26 NOTE — NURSING
Scheduled medications exp[ained and administered. Mrs Saxena only took the 500 mg Robaxin of scheduled 1,000 mg of Robaxin 325 mg of ordered 650 mg of Tylenol, and stated to come back with the gabapentin 100 mg capsule. Pt rated pain level at 4 on a pain scale of 0-10. She stated that she does not like taking all those pills. Verbal teaching given on how taking medications as ordered will aide in pain reduction. Mrs Saxena verbalized understanding of information. Will continue to monitor.

## 2019-11-26 NOTE — PT/OT/SLP PROGRESS
Occupational Therapy   Treatment    Name: Lala Saxena  MRN: [id number]  Admitting Diagnosis:  Cervical myelopathy  7 Days Post-Op    Recommendations:     Discharge Recommendations: nursing facility, skilled  Discharge Equipment Recommendations:  none  Barriers to discharge:  Decreased caregiver support    Assessment:     Lala Saxena is a 59 y.o. female with a medical diagnosis of Cervical myelopathy.  She presents with the following Performance deficits affecting function are weakness, impaired endurance, impaired self care skills, gait instability, impaired functional mobilty, impaired balance, decreased lower extremity function, decreased upper extremity function, decreased safety awareness, pain.     Pt motivated for therapy and tolerated session well. Pt. With c/o of dizziness upon standing that faded with ambulation. Pt. Ambulated household distances CGA -Min A with RW to complete grooming tasks while standing at sink. Pt is progressing toward goals but continues to demonstrate decreased balance when transferring and during ambulation. Continue OT POC.    Rehab Prognosis:  Good; patient would benefit from acute skilled OT services to address these deficits and reach maximum level of function.       Plan:     Patient to be seen 4 x/week to address the above listed problems via self-care/home management, therapeutic activities, therapeutic exercises  · Plan of Care Expires: 12/20/19  · Plan of Care Reviewed with: patient    Subjective     Pain/Comfort:  · Pain Rating 1: 0/10  · Location 1: neck  · Pain Addressed 1: Reposition, Distraction    Objective:     Communicated with: Rn prior to session.  Patient found up in chair with cervical collar upon OT entry to room.    General Precautions: Standard, fall   Orthopedic Precautions:spinal precautions   Braces: Aspen collar     Occupational Performance:     Bed Mobility:    · Not tested - pt in chair upon OT entry     Functional Mobility/Transfers:  · Patient  completed Sit <> Stand Transfer with minimum assistance  with  rolling walker   · Functional Mobility:  Pt. Ambulated household distances CGA -Min A with RW to complete grooming tasks while standing at sink. Pt is progressing toward goals but continues to demonstrate decreased balance when transferring and during ambulation. Continue OT POC.    Activities of Daily Living:  · Grooming: stand by assistance standing at sink  · Upper Body Dressing: minimum assistance hansa gown as shaylee      [facility] 6 Click ADL: 17    Treatment & Education:  - OT/POC-  - Importance of mobility to maximize recovery.  - safety w/ functional mobility; hand placement to ensure safe transfers to various surfaces in prep for ADLs  - Reviewed gait sequence and RW management via verbalization and demonstration   - encouraged to ambulate within household environment at least every hour to hour 1/2      Patient left up in chair with all lines intact, call button in reach and RN notifiedEducation:      GOALS:   Multidisciplinary Problems     Occupational Therapy Goals        Problem: Occupational Therapy Goal    Goal Priority Disciplines Outcome Interventions   Occupational Therapy Goal     OT, PT/OT Ongoing, Progressing    Description:  Goals to be met by: 12/20/19     Patient will increase functional independence with ADLs by performing:    UE Dressing with Stand-by Assistance.  LE Dressing with Minimal Assistance.  Grooming while standing with Stand-by Assistance.  Toileting from bedside commode with Stand-by Assistance for hygiene and clothing management.   Toilet transfer to bedside commode with Stand-by Assistance.                      Time Tracking:     OT Date of Treatment: 11/26/19  OT Start Time: 0934  OT Stop Time: 1006  OT Total Time (min): 32 min    Billable Minutes:Self Care/Home Management 15  Therapeutic Activity 17    Bettie Krishnamurthy OT  11/26/2019

## 2019-11-27 PROBLEM — D62 ACUTE BLOOD LOSS ANEMIA: Status: ACTIVE | Noted: 2019-11-27

## 2019-11-27 PROBLEM — R53.81 DEBILITY: Status: ACTIVE | Noted: 2019-11-27

## 2019-11-27 PROBLEM — E03.4 HYPOTHYROIDISM DUE TO ACQUIRED ATROPHY OF THYROID: Status: ACTIVE | Noted: 2019-11-27

## 2019-11-27 LAB
POCT GLUCOSE: 147 MG/DL (ref 70–110)
POCT GLUCOSE: 189 MG/DL (ref 70–110)
POCT GLUCOSE: 91 MG/DL (ref 70–110)

## 2019-11-27 PROCEDURE — 25000003 PHARM REV CODE 250: Performed by: STUDENT IN AN ORGANIZED HEALTH CARE EDUCATION/TRAINING PROGRAM

## 2019-11-27 PROCEDURE — 97162 PT EVAL MOD COMPLEX 30 MIN: CPT

## 2019-11-27 PROCEDURE — 97530 THERAPEUTIC ACTIVITIES: CPT

## 2019-11-27 PROCEDURE — 97535 SELF CARE MNGMENT TRAINING: CPT

## 2019-11-27 PROCEDURE — 25000003 PHARM REV CODE 250: Performed by: INTERNAL MEDICINE

## 2019-11-27 PROCEDURE — 63600175 PHARM REV CODE 636 W HCPCS: Performed by: INTERNAL MEDICINE

## 2019-11-27 PROCEDURE — 99306 1ST NF CARE HIGH MDM 50: CPT | Mod: ,,, | Performed by: INTERNAL MEDICINE

## 2019-11-27 PROCEDURE — 11000004 HC SNF PRIVATE

## 2019-11-27 PROCEDURE — 99306 PR NURSING FACILITY CARE, INIT, HIGH SEVERITY: ICD-10-PCS | Mod: ,,, | Performed by: INTERNAL MEDICINE

## 2019-11-27 PROCEDURE — 97166 OT EVAL MOD COMPLEX 45 MIN: CPT

## 2019-11-27 PROCEDURE — 97110 THERAPEUTIC EXERCISES: CPT

## 2019-11-27 PROCEDURE — 97116 GAIT TRAINING THERAPY: CPT

## 2019-11-27 RX ORDER — HEPARIN SODIUM 5000 [USP'U]/ML
5000 INJECTION, SOLUTION INTRAVENOUS; SUBCUTANEOUS EVERY 8 HOURS
Status: DISCONTINUED | OUTPATIENT
Start: 2019-11-27 | End: 2019-12-03 | Stop reason: HOSPADM

## 2019-11-27 RX ORDER — ASCORBIC ACID 250 MG
250 TABLET ORAL NIGHTLY
Status: DISCONTINUED | OUTPATIENT
Start: 2019-11-27 | End: 2019-12-03 | Stop reason: HOSPADM

## 2019-11-27 RX ORDER — FERROUS SULFATE 325(65) MG
325 TABLET, DELAYED RELEASE (ENTERIC COATED) ORAL NIGHTLY
Status: DISCONTINUED | OUTPATIENT
Start: 2019-11-27 | End: 2019-12-03 | Stop reason: HOSPADM

## 2019-11-27 RX ADMIN — GLIMEPIRIDE 4 MG: 1 TABLET ORAL at 08:11

## 2019-11-27 RX ADMIN — ACETAMINOPHEN 650 MG: 325 TABLET ORAL at 10:11

## 2019-11-27 RX ADMIN — ACETAMINOPHEN 650 MG: 325 TABLET ORAL at 02:11

## 2019-11-27 RX ADMIN — METHOCARBAMOL TABLETS 1000 MG: 500 TABLET, COATED ORAL at 02:11

## 2019-11-27 RX ADMIN — MENTHOL, METHYL SALICYLATE: 10; 15 CREAM TOPICAL at 08:11

## 2019-11-27 RX ADMIN — HEPARIN SODIUM 5000 UNITS: 5000 INJECTION, SOLUTION INTRAVENOUS; SUBCUTANEOUS at 02:11

## 2019-11-27 RX ADMIN — Medication 250 MG: at 08:11

## 2019-11-27 RX ADMIN — METHOCARBAMOL TABLETS 1000 MG: 500 TABLET, COATED ORAL at 08:11

## 2019-11-27 RX ADMIN — GABAPENTIN 100 MG: 100 CAPSULE ORAL at 02:11

## 2019-11-27 RX ADMIN — HEPARIN SODIUM 5000 UNITS: 5000 INJECTION, SOLUTION INTRAVENOUS; SUBCUTANEOUS at 10:11

## 2019-11-27 RX ADMIN — CELECOXIB 200 MG: 100 CAPSULE ORAL at 08:11

## 2019-11-27 RX ADMIN — SENNOSIDES AND DOCUSATE SODIUM 1 TABLET: 8.6; 5 TABLET ORAL at 08:11

## 2019-11-27 RX ADMIN — LOSARTAN POTASSIUM 25 MG: 25 TABLET, FILM COATED ORAL at 08:11

## 2019-11-27 RX ADMIN — FERROUS SULFATE TAB EC 325 MG (65 MG FE EQUIVALENT) 325 MG: 325 (65 FE) TABLET DELAYED RESPONSE at 08:11

## 2019-11-27 RX ADMIN — ATORVASTATIN CALCIUM 10 MG: 10 TABLET, FILM COATED ORAL at 08:11

## 2019-11-27 RX ADMIN — GABAPENTIN 100 MG: 100 CAPSULE ORAL at 08:11

## 2019-11-27 RX ADMIN — LEVOTHYROXINE SODIUM 50 MCG: 50 TABLET ORAL at 05:11

## 2019-11-27 RX ADMIN — METFORMIN HYDROCHLORIDE 1000 MG: 500 TABLET, FILM COATED ORAL at 08:11

## 2019-11-27 RX ADMIN — AMLODIPINE BESYLATE 5 MG: 5 TABLET ORAL at 08:11

## 2019-11-27 RX ADMIN — METFORMIN HYDROCHLORIDE 1000 MG: 500 TABLET, FILM COATED ORAL at 05:11

## 2019-11-27 RX ADMIN — ACETAMINOPHEN 650 MG: 325 TABLET ORAL at 05:11

## 2019-11-27 NOTE — PLAN OF CARE
PT anamaria completed today (2019). Pt will begin PT POC  Jerry Orlandoltman, SPT  2019    Problem: Physical Therapy Goal  Goal: Physical Therapy Goal  Description  Goals to be met by: 2019    Patient will increase functional independence with mobility by performin. Supine to sit with Modified [place]  2. Sit to supine with Modified [place]  3. Rolling to Left and Right with Modified [place].  4. Sit to stand transfer with Modified [place]  5. Bed to chair transfer with Supervision using Rolling Walker  6. Gait  x 120 feet with Supervision using Rolling Walker.   7. Ascend/descend 4 steps with bilateral Handrails Stand-by Assistance   8. Ascend/Descend 4 inch curb step with Supervision using Rolling Walker.  9. Stand for 5 minutes with Supervision using Rolling Walker while performing dynamic acticity  10.Perform car transfer (actual or simulated) w/ RW and SBA     Outcome: Ongoing, Progressing

## 2019-11-27 NOTE — PT/OT/SLP EVAL
PhysicalTherapy   Evaluation    Lala Saxena   MRN: [id number]     PT Received On: 19  PT Start Time: 1016     PT Stop Time: 1120    PT Total Time (min): 64 min       Billable Minutes:  Evaluation 15, Gait Training 20, Therapeutic Activity 14, Therapeutic Exercise 15 and Total Time 49    Diagnosis: Cervical myelopathy, s/p cervical spinal fusion 2019  Past Medical History:   Diagnosis Date    Diabetes mellitus, type 2     Hyperlipidemia     Hypertension     Thyroid disease       Past Surgical History:   Procedure Laterality Date    ABDOMINAL SURGERY       SECTION      FRACTURE SURGERY           General Precautions: Standard, fall  Orthopedic Precautions: spinal precautions   Braces: Aspen collar    Spiritual, Cultural Beliefs, Episcopal Practices, Values that Affect Care: no    Patient History:  Lives With: spouse  Living Arrangements: house(2 SH with bedroom and half bath on 1st floor)  Home Accessibility: stairs within home(13-14 steps to get to 2nd level w/ BHR)  Home Layout: Able to live on 1st floor  Stair Railings at Home: inside, present at both sides  Transportation Anticipated: family or friend will provide  Living Environment Comment: pt lives w/ spouse in a 2 SH w/ a threshold step to enter. Pt's bedroom and half bathroom are on the 1st floor; Pt reports that her  and sister will be able to assist upon d/c  Equipment Currently Used at Home: cane, straight, walker, rolling, rollator, bath bench, bedside commode  DME owned (not currently used): none    Previous Level of Function:  PTA pt was (I) w/ all ADLs, transfers, and ambulating w/ no AD before onset of weakness. After onset of weakness pt required assistance w/ bathing and was ambulating w/ SPC in [employer], RW in the community, and rollator in the home. Pt rpts that she is no longer working but is able to drive.  Ambulation Skills: needs device  Transfer Skills: needs device  ADL Skills: needs device  Work/Leisure  Activity: unable to perform(pt rpt that she was able to drive)    Subjective:  Communicated with patient prior to session. Pt consented to today's evaluation and treatment session.  Chief Complaint: Decreased strength and level of function  Patient goals: Increase level of independence in preparation for d/c    Pain/Comfort  Pain Rating 1: 4/10  Location - Side 1: Bilateral  Location - Orientation 1: generalized  Location 1: (posterior shoulder between scaupula)  Pain Addressed 1: Distraction  Pain Rating Post-Intervention 1: 4/10    Objective:  Patient found sitting in w/c.  Patient found with: cervical collar, wound vac    Cognitive Exam:  Oriented to: Person, Place, Time and Situation  Follows Commands/attention: Follows multistep  commands  Communication: clear/fluent  Safety awareness/insight to disability: intact    Physical Exam:  Postural examination/scapula alignment:    -       Rounded shoulders    Skin integrity: Visible skin intact  Edema: None noted     Sensation:      -       Intact    Upper Extremity Range of Motion:  Right Upper Extremity: WFL  Left Upper Extremity: WFL    Upper Extremity Strength:  Right Upper Extremity: WFL except Elbow flexion 3+/5  Left Upper Extremity: WFL except Elbow flexion 3+/5    Lower Extremity Range of Motion:  Right Lower Extremity: WFL  Left Lower Extremity: WFL    Lower Extremity Strength:  Right Lower Extremity: WFL except 3+/5 HF  Left Lower Extremity: WFL except 3+/5 HF, 4-/5 KF     Fine motor coordination:     -       Intact    Gross motor coordination: WFL      AM-PAC 6 CLICK MOBILITY  Total Score:16    Bed Mobility:  Sit>Supine: from EOM w/ ModA at BLE and trunk  Supine>Sit: from mat w/ ModA at BLE and trunk    Transfers:  Sit<>Stand: to/from w/c w/ RW and SBA, to/from EOM w/ RW and SBA  Stand Pivot Transfer: from EOM>w/c w/ RW and SBA    Gait:  Amb x2 trials (98 ft, 43 ft) w/ RW and SBA   - Cues for increased step length and width   - Pt demo'd step-through gait  pattern    Advanced Gait:  Curb Step: Asc/Desc 4: curb step w/ RW and CGA   - Spt demo'd proper sequence to pt, pt able to verb and demo understanding    Therex:  Seated exercises: HF x10 reps, LAQ, Heel digs (w/ 3 sec isometric hold) x20 reps   - Pt unable to tolerate increased reps of hip flexion secondary to rpt muscle spasms    Additional Treatment:  Pt educated on PT role, PT POC, and spinal precautions.  Pt able to verb understanding of spinal precautions and repeat them back.    Patient left up in chair with all lines intact and call button in reach.    Assessment:  Lala Saxena is a 59 y.o. female with a medical diagnosis of Cervical myelopathy, s/p cervical spinal fusion 11/19/2019. Pt alexa today's initial evaluation well w/ good participation. Prior to progressed symptoms of cervical myelopathy pt was Cosme w/ bed mobility and transfers, required assistance w/ bathing, and was ambulating w/ SPC and RW in community and with rollator in home. Currently pt is at a SBA-ModA level of assistance for bed mobility, transfers, and ambulating w/ a RW. Pt will benefit from skilled therapy to increase level of independence and functional mobility in preparation for return home at d/c. Pt will begin PT POC.    Rehab identified problem list/impairments: weakness, impaired endurance, impaired sensation, impaired functional mobilty, gait instability, impaired balance, decreased lower extremity function, decreased upper extremity function, orthopedic precautions    Rehab potential is good.    Activity tolerance: Good    Discharge recommendations: home health PT     Barriers to discharge: Decreased caregiver support    Equipment recommendations: none     GOALS:   Multidisciplinary Problems     Physical Therapy Goals        Problem: Physical Therapy Goal    Goal Priority Disciplines Outcome Goal Variances Interventions   Physical Therapy Goal     PT, PT/OT      Description:  Goals to be met by: 12/18/2019    Patient will  increase functional independence with mobility by performin. Supine to sit with Modified [place]  2. Sit to supine with Modified [place]  3. Rolling to Left and Right with Modified [place].  4. Sit to stand transfer with Modified [place]  5. Bed to chair transfer with Supervision using Rolling Walker  6. Gait  x 120 feet with Supervision using Rolling Walker.   7. Ascend/descend 4 steps with bilateral Handrails Stand-by Assistance   8. Ascend/Descend 4 inch curb step with Supervision using Rolling Walker.  9. Stand for 5 minutes with Supervision using Rolling Walker while performing dynamic acticity  10.Perform car transfer (actual or simulated) w/ RW and SBA                      PLAN:    Patient to be seen 5 x/week  to address the above listed problems via gait training, therapeutic activities, therapeutic exercises, neuromuscular re-education  Plan of Care Expires: 19    Jerry Barkley, [facility] 2019

## 2019-11-27 NOTE — HPI
HPI: Patient is a 59-year-old female w/ pmh HTN, HLD, type 2 DM who presents the ED with complaint of gait abnormalities after told to present to ER due to MRI results obtained by neurology clinic. Patient states 1.5 month history of gait imbalance, hand clumsiness, and hand weakness. She reports feeling like she does not have control of her legs when she walks. She reports funny feeling in bilateral hands which is hard for her to describe but denies numbness in upper or lower extremities. She reports weakness in fingers of left hand worse than right but no other georgi weakness in bilateral upper extremities. She does consider her balance difficulties as weakness. She reports bladder incontinence over the last several weeks which is new for her. No bowel incontinence. She denies perianal paresthesias. She has been seeing neurology as an outpatient for these symptoms who obtained MRI of brain and c-spine and referred to ER after results read showing significant cord compression at C3-4. She was fully ambulatory at baseline before these symptoms began and does not take any blood thinners.

## 2019-11-27 NOTE — PLAN OF CARE
Problem: Occupational Therapy Goal  Goal: Occupational Therapy Goal  Description  Goals to be met by: 12/18/19     Patient will increase functional independence with ADLs by performing:    Feeding with Modified [place].  UE Dressing with Stand-by Assistance.  LE Dressing with Minimal Assistance.  Grooming while seated with Stand-by Assistance.  Toileting from toilet with Minimal Assistance for hygiene and clothing management.   Bathing from  sitting at sink with Moderate Assistance.  Supine to sit with Stand-by Assistance.  Step transfer with Stand-by Assistance with RW  Upper extremity exercise program x10 reps per set, with supervision.  Caregiver will be competent with (A) with self care assist and with functional transfers.     Outcome: Ongoing, Progressing

## 2019-11-27 NOTE — PT/OT/SLP EVAL
Occupational Therapy  Evaluation / Treatment    Lala Saxena   MRN: [id number]   Admitting Diagnosis: Cervical myelopathy     OT Date of Treatment: 19   OT Start Time: 835  OT Stop Time: 935  OT Total Time (min): 60 min    Billable Minutes:  Evaluation 20  Self Care/Home Management 40    Diagnosis: Cervical myelopathy    Past Medical History:   Diagnosis Date    Diabetes mellitus, type 2     Hyperlipidemia     Hypertension     Thyroid disease       Past Surgical History:   Procedure Laterality Date    ABDOMINAL SURGERY       SECTION      FRACTURE SURGERY           General Precautions: Standard, fall  Orthopedic Precautions: spinal precautions  Braces: Aspen collar    Spiritual, Cultural Beliefs, Baptism Practices, Values that Affect Care: no     Patient History:  Lives With: spouse  Living Arrangements: house(2 story with bedroom on 1st floor but no bath.)  Transportation Anticipated: family or friend will provide  Equipment Currently Used at Home: cane, straight, walker, rolling, bedside commode    Prior level of function:    Bed Mobility/Transfers: independent  Grooming: independent  Bathing: independent  Upper Body Dressing: independent  Lower Body Dressing: independent  Toileting: independent  Driving License: Yes  Mode of Transportation: Car, Family, Friends  Occupation: Other (Comment)(Pt was not working PTA)  Leisure and Hobbies: Pt indicating that she was very active and (I) in all aspects.     Dominant hand: right    Subjective:  Communicated with nurse prior to session.    Chief Complaint: Pt reporting pain in (B) shlds when lifting arms above shld level.  Patient/Family stated goals: Pt wants to return to PLOF    Pain/Comfort  Pain Rating 1: 6/10  Location - Side 1: Bilateral  Location - Orientation 1: generalized  Location 1: shoulder(and posterior neck)  Pain Addressed 1: Reposition, Distraction, Cessation of Activity, Pre-medicate for activity  Pain Rating Post-Intervention  1: 6/10    Objective:   Patient found with: cervical collar, wound vac    Cognitive Exam:  Oriented to: Person, Place, Time and Situation  Follows Commands/attention: Follows multistep  commands  Communication: clear/fluent  Memory:  No Deficits noted  Safety awareness/insight to disability: intact  Coping skills/emotional control: Appropriate to situation    Visual/perceptual:  Intact    Physical Exam:  Postural examination/scapula alignment:    -       Rounded shoulders  Skin integrity: Visible skin intact  Edema: None noted (B) UEs    Sensation:      -   Grossly  Intact    Upper Extremity Range of Motion:  Right Upper Extremity: WFL except shld flexion and abduction with both limited to 0-120 degs AROM   Left Upper Extremity: WFL except shld flexion and abduction with both limited to 0-80 degs AROM and 0-120 A/AROM  Pain reported with AROM  of shld flexion greater than 80    Upper Extremity Strength:  Right Upper Extremity: Grossly 4/5 Elbow to distal UE with Shld flexion and abduction not MMT secondary to shld pain.  Left Upper Extremity: Grossly 4/5 Elbow to distal UE with Shld flexion and abduction not MMT secondary to shld pain   Strength: WFLS    Fine motor coordination:   Mildly diminished as noted when attempting to manipulate small objects and fasteners.    Gross motor coordination: WFL    Occupational Performance:    Bed Mobility:    · Patient completed Rolling/Turning to Left with  minimum assistance  · Patient completed Rolling/Turning to Right with minimum assistance  · Patient completed Scooting/Bridging with moderate assistance  · Patient completed Supine to Sit with moderate assistance     Functional Mobility/Transfers:  · Patient completed Sit <> Stand Transfer with contact guard assistance  with  rolling walker   · Patient completed Bed <> Chair Transfer using Step Transfer technique with contact guard assistance with rolling walker  · Patient completed Toilet Transfer Step Transfer technique  with contact guard assistance with  rolling walker  · Functional Mobility: Pt ambulated from EOB to toilet in restroom with CGA a distance of 12 ft with increased time required.    Activities of Daily Living:  · Feeding:  set up  to open packets . .  · Grooming: set up with grooming performed seated sinkside. .  · Bathing: maximal assistance with sponge bath performed seated on BSC.  · Upper Body Dressing: maximal assistance donning /doffing pullover shirt.  · Lower Body Dressing: maximal assistance with Pt attempting to don pants , brief and socks.  · Toileting: maximal assistance performed from BSC with RW to stand.    Additional Treatment:  Pt edu on Plan of care,  safety when performing functional transfers, self care tasks and functional standing activities.  - White board updated  - Self care tasks completed-- as noted above       Patient left up in chair with call button in reach and nurse notified    [facility] 6 Click:  [facility] Total Score: 14  Assessment:  Lala Saxena is a 59 y.o. female with a medical diagnosis of Cervical myelopathy .   Pt presents with performance deficits of Physical skills including impaired functional mobility, strength, functional endurance, fine motor coordination, functional standing balance, and  functional use of ( B ) UEs and LEs . Pt also demonstrating decreased safety and functional performance of Functional Activities and self care activities as well as functional mobility. Pt is motivated and would benefit from OT intervention to further her functional (I)ce and safety.      Rehab identified problem list/impairments: weakness, impaired endurance, impaired self care skills, impaired functional mobilty, gait instability, impaired balance, decreased upper extremity function, decreased lower extremity function, decreased safety awareness, pain, decreased ROM, impaired fine motor    Rehab potential is good    Activity tolerance: Good    Discharge recommendations: home health OT      Barriers to discharge: Decreased caregiver support     Equipment recommendations: (TBD)     GOALS:   Multidisciplinary Problems     Occupational Therapy Goals        Problem: Occupational Therapy Goal    Goal Priority Disciplines Outcome Interventions   Occupational Therapy Goal     OT, PT/OT Ongoing, Progressing    Description:  Goals to be met by: 12/18/19     Patient will increase functional independence with ADLs by performing:    Feeding with Modified [place].  UE Dressing with Stand-by Assistance.  LE Dressing with Minimal Assistance.  Grooming while seated with Stand-by Assistance.  Toileting from toilet with Minimal Assistance for hygiene and clothing management.   Bathing from  sitting at sink with Moderate Assistance.  Supine to sit with Stand-by Assistance.  Step transfer with Stand-by Assistance with RW  Upper extremity exercise program x10 reps per set, with supervision.  Caregiver will be competent with (A) with self care assist and with functional transfers.                      PLAN: Patient to be seen 5 x/week, 6 x/week to address the above listed problems via self-care/home management, therapeutic activities, therapeutic exercises, neuromuscular re-education  Plan of Care expires: 12/27/19  Plan of Care reviewed with: patient    Noel Arthur OTR/L  11/27/2019

## 2019-11-27 NOTE — H&P
Hospital Medicine  History and Physical Exam    Admit Date: 2019  CARLOS TBD  Principal Problem:  Cervical myelopathy   Primary care Physician: Edward Hernandez MD  Code status: Full Code    HPI:   Patient is a 59-year-old female w/ pmh HTN, HLD, type 2 DM has had gait abnormality for 1.5 month with history of gait imbalance, hand clumsiness, and hand weakness. She was evaluated by neurology. MRI of C-spine showed signficant cord compression and was referred to ER on . She was discharged with medrol dosepak to return for elective surgery. Patient was admitted  and underwent posterior cervical spinal fusion and laminectomy for spinal stenosis decompression of C3-6 . Patient's operative and post-operative course was unremarkable. She states she has lower neck pain that radiates across her shoulders. She additionally complains of low back pain in certain positions. She states her BUE  paraesthesias have resolved. She finds the current pain regimen adequate over all.     Patient transferred to [facility] with PT and OT to improve functional status and ability to perform ADLs.     Past Medical History: Patient has a past medical history of Diabetes mellitus, type 2, Hyperlipidemia, Hypertension, and Thyroid disease.    Past Surgical History: Patient has a past surgical history that includes  section; Abdominal surgery; and Fracture surgery.    Social History: Patient reports that she has never smoked. She has never used smokeless tobacco. She reports that she drank alcohol. She reports that she does not use drugs.    Family History: family history is not on file.    Medications: reviewed     Allergies: Patient has No Known Allergies.    ROS  Constitutional: no fever or chills  Respiratory: no cough or shortness of breath  Cardiovascular: no chest pain or palpitations  Gastrointestinal: no nausea or vomiting, no abdominal pain or change in bowel habits  Genitourinary: no hematuria or  dysuria  Integument/Breast: no rash or pruritis  Hematologic/Lymphatic: no easy bruising or lymphadenopathy  Musculoskeletal: no arthralgias or myalgias  Neurological: no seizures or tremors  Behavioral/Psych: no depression or anxiety    PEx  Temp:  [98.4 °F (36.9 °C)]   Pulse:  [87-88]   Resp:  [16-18]   BP: (152-161)/(66-74)   SpO2:  [96 %]   Body mass index is 31.9 kg/m².     General appearance: no distress  Mental status: Alert and oriented x 3  HEENT:  conjunctivae/corneas clear, PERRL  Neck: supple, thyroid not enlarged  Pulm:   normal respiratory effort, CTA B, no c/w/r  Card: RRR, no murmur  Abd: soft, NT, ND, BS present; no masses, no organomegaly  Ext: no edema  Pulses: 2+, symmetric  Skin: color, texture, turgor normal. No rashes or lesions  Neuro: CN II-XII grossly intact, no focal numbness or weakness, normal strength and tone     Results for MARY BOSS (MRN [id number]) as of 11/27/2019 08:58   Ref. Range 11/20/2019 05:23   WBC Latest Ref Range: 3.90 - 12.70 K/uL 19.42 (H)   RBC Latest Ref Range: 4.00 - 5.40 M/uL 3.16 (L)   Hemoglobin Latest Ref Range: 12.0 - 16.0 g/dL 9.0 (L)   Hematocrit Latest Ref Range: 37.0 - 48.5 % 29.0 (L)   MCV Latest Ref Range: 82 - 98 fL 92   MCH Latest Ref Range: 27.0 - 31.0 pg 28.5   MCHC Latest Ref Range: 32.0 - 36.0 g/dL 31.0 (L)   RDW Latest Ref Range: 11.5 - 14.5 % 12.6   Platelets Latest Ref Range: 150 - 350 K/uL 278   MPV Latest Ref Range: 9.2 - 12.9 fL 11.2   Gran% Latest Ref Range: 38.0 - 73.0 % 80.9 (H)   Gran # (ANC) Latest Ref Range: 1.8 - 7.7 K/uL 15.7 (H)   Lymph% Latest Ref Range: 18.0 - 48.0 % 11.0 (L)   Lymph # Latest Ref Range: 1.0 - 4.8 K/uL 2.1   Mono% Latest Ref Range: 4.0 - 15.0 % 7.1   Mono # Latest Ref Range: 0.3 - 1.0 K/uL 1.4 (H)   Eosinophil% Latest Ref Range: 0.0 - 8.0 % 0.3   Eos # Latest Ref Range: 0.0 - 0.5 K/uL 0.1   Basophil% Latest Ref Range: 0.0 - 1.9 % 0.2   Baso # Latest Ref Range: 0.00 - 0.20 K/uL 0.04   nRBC Latest Ref Range: 0 /100  WBC 0   Differential Method Unknown Automated   Immature Grans (Abs) Latest Ref Range: 0.00 - 0.04 K/uL 0.10 (H)   Immature Granulocytes Latest Ref Range: 0.0 - 0.5 % 0.5   Sodium Latest Ref Range: 136 - 145 mmol/L 130 (L)   Potassium Latest Ref Range: 3.5 - 5.1 mmol/L 3.7   Chloride Latest Ref Range: 95 - 110 mmol/L 102   CO2 Latest Ref Range: 23 - 29 mmol/L 21 (L)   Anion Gap Latest Ref Range: 8 - 16 mmol/L 7 (L)   BUN, Bld Latest Ref Range: 6 - 20 mg/dL 13   Creatinine Latest Ref Range: 0.5 - 1.4 mg/dL 0.7   eGFR if non African American Latest Ref Range: >60 mL/min/1.73 m^2 >60.0   eGFR if African American Latest Ref Range: >60 mL/min/1.73 m^2 >60.0   Glucose Latest Ref Range: 70 - 110 mg/dL 126 (H)   Calcium Latest Ref Range: 8.7 - 10.5 mg/dL 8.3 (L)       Recent Labs   Lab 11/25/19  2246 11/26/19  0740 11/26/19  1140 11/26/19  1628 11/26/19  2113 11/27/19  0737   POCTGLUCOSE 168* 175* 265* 245* 234* 189*       Assessment and Plan:    Cervical myelopathy  S/p C3-6 laminectomy and fusion  Cervical DJD  debility  Follow up with orthopedics as scheduled  PT/OT: WBAT in cervical collar  Pain control: acetaminophen 650 mg TID, celecoxib 200 mg daily, gabapentin 100 mg TID, methocarbamol 1000 mg TID, oxycodone 5 mg prn  Add Methylsalicylate menthol to shoulders and gluteus medius  DVT PPx: heparin 5000 units subq TID    Essential hypertension  Continue losartan 25 mg daily and amlodipine 5 mg daily     Expected blood loss anemia  Unspecified chronic anemia  Leukocytosis  Ferrous sulfate 325 mg and ascorbic acid nightly  Iron levels, vitamin D level  Leukocytosis present on routine labs - follow up with PCP as outpatient for further evaluation and treatment as needed    Hypothyroidism - levothyroxine daily    DM II with Hyperlipidemia  Atorvastatin 10 mg daily    DM II with HTN and CKD II  Restart home glimepiride 4 mg daily and metformin 1 g BID  SSI and accuchecks while here     Continue the following medications for  treatment of the indicated conditions:  ·  Indication Medication Dose Route  Frequency       pain acetaminophen  650 mg Oral Q8H    HTN amLODIPine  5 mg Oral Daily    hyperlipidemia atorvastatin  10 mg Oral Daily    pain celecoxib  200 mg Oral Daily    pain gabapentin  100 mg Oral TID    DM II glimepiride  4 mg Oral Daily with breakfast    hypothyroidism levothyroxine  50 mcg Oral Before breakfast    HTN losartan  25 mg Oral Daily    DM II metFORMIN  1,000 mg Oral BID WM    pain methocarbamol  1,000 mg Oral TID    pain methyl salicylate-menthol 15-10%   Topical (Top) TID    BM reigmen senna-docusate 8.6-50 mg  1 tablet Oral BID       Future Appointments   Date Time Provider Department Center   12/2/2019  8:15 AM Quyen Ramírez PA-C [facility] SPINE [facility]   12/5/2019 11:00 AM Edward Hernandez MD [facility] IM [facility]   12/10/2019  9:15 AM Quyen Ramírez PA-C [facility] SPINE [facility]   12/23/2019  7:20 AM Janette Church MD [facility] MED [place]       I certify that SNF services are required to be given on an inpatient basis because Lala Saxena needs for skilled nursing care and/or skilled rehabilitation are required on a daily basis and such services can only practically be provided in a skilled nursing facility setting and are for an ongoing condition for which she received inpatient care in the hospital.     Time (minutes) spent in care of the patient (Greater than 1/2 spent in direct face-to-face contact) 60    Yenifer Munoz MD

## 2019-11-27 NOTE — HOSPITAL COURSE
On 11/19/2019, the patient arrived to the [facility] for proper pre-operative management.  Upon completion of pre-operative preparation, the patient was taken back to the operative theatre.  A C3-6 posterior cervical laminectomy and fusion was performed without complication and the patient was transported to the post anesthesia care unit in stable condition.  After appropriate recovery from the anaesthetic agents used during the surgery, the patient was then transported to the hospital inpatient floor.  The interim of the hospital stay from arrival on the floor up to discharge has been uncomplicated. The patient's diet has progressed to a regular diet with no nausea or vomiting.  The patient's pain has been controlled using a multimodal approach. Currently, the patient's pain is well controlled on an oral regimen.  The patient had a davalos catheter placed intraoperatively.  This davalos was discontinued post-operatively and the patient has been voiding without difficulty ever since.  The patient began participation in physical therapy on post-operative day one and has progressed throughout the entire hospital stay.  Currently the patient is ambulating with moderate assistance and the physical therapy team feels that the patient's progress is sufficient to allow the patient to be discharged to SNF safely.  The patient agrees with this assessment and desires a discharge to SNF today.

## 2019-11-27 NOTE — DISCHARGE SUMMARY
[facility]  Orthopedics  Discharge Summary      Patient Name: Lala Saxena  MRN: [id number]  Admission Date: 11/19/2019  Hospital Length of Stay: 5 days  Discharge Date and Time:  11/26/2019 10:29 PM  Attending Physician: Miguel Cruz MD  Discharging Provider: Derek Lopez MD  Primary Care Provider: Edward Hernandez MD    HPI:   HPI: Patient is a 59-year-old female w/ pmh HTN, HLD, type 2 DM who presents the ED with complaint of gait abnormalities after told to present to ER due to MRI results obtained by neurology clinic. Patient states 1.5 month history of gait imbalance, hand clumsiness, and hand weakness. She reports feeling like she does not have control of her legs when she walks. She reports funny feeling in bilateral hands which is hard for her to describe but denies numbness in upper or lower extremities. She reports weakness in fingers of left hand worse than right but no other georgi weakness in bilateral upper extremities. She does consider her balance difficulties as weakness. She reports bladder incontinence over the last several weeks which is new for her. No bowel incontinence. She denies perianal paresthesias. She has been seeing neurology as an outpatient for these symptoms who obtained MRI of brain and c-spine and referred to ER after results read showing significant cord compression at C3-4. She was fully ambulatory at baseline before these symptoms began and does not take any blood thinners.    Procedure(s) (LRB):  FUSION, SPINE, CERVICAL C3-6 (N/A)      Hospital Course:  On 11/19/2019, the patient arrived to the [facility] for proper pre-operative management.  Upon completion of pre-operative preparation, the patient was taken back to the operative theatre.  A C3-6 posterior cervical laminectomy and fusion was performed without complication and the patient was transported to the post anesthesia care unit in stable condition.  After appropriate  recovery from the anaesthetic agents used during the surgery, the patient was then transported to the hospital inpatient floor.  The interim of the hospital stay from arrival on the floor up to discharge has been uncomplicated. The patient's diet has progressed to a regular diet with no nausea or vomiting.  The patient's pain has been controlled using a multimodal approach. Currently, the patient's pain is well controlled on an oral regimen.  The patient had a davalos catheter placed intraoperatively.  This davalos was discontinued post-operatively and the patient has been voiding without difficulty ever since.  The patient began participation in physical therapy on post-operative day one and has progressed throughout the entire hospital stay.  Currently the patient is ambulating with moderate assistance and the physical therapy team feels that the patient's progress is sufficient to allow the patient to be discharged to SNF safely.  The patient agrees with this assessment and desires a discharge to SNF today.            Significant Diagnostic Studies: Labs: BMP: No results for input(s): GLU, NA, K, CL, CO2, BUN, CREATININE, CALCIUM, MG in the last 48 hours. and CBC No results for input(s): WBC, HGB, HCT, PLT in the last 48 hours.  Radiology: X-Ray: cervical spine    Pending Diagnostic Studies:     None        Final Active Diagnoses:    Diagnosis Date Noted POA    PRINCIPAL PROBLEM:  Cervical myelopathy [G95.9] 11/12/2019 Yes    Hyperlipidemia [E78.5] 11/19/2019 Yes    Type 2 diabetes mellitus, without long-term current use of insulin [E11.9] 11/07/2019 Yes    Essential hypertension [I10] 11/07/2019 Yes      Problems Resolved During this Admission:      Discharged Condition: good    Disposition: Home or Self Care    Follow Up:  Follow-up Information     Quyen Ramírez PA-C. Go on 12/10/2019.    Specialty:  Orthopedic Surgery  Why:  For wound re-check; 9:15 am  Contact information:  [address]  [place]  [place] [address]  [phone]                 Patient Instructions:   No discharge procedures on file.  Medications:  Reconciled Home Medications:      Medication List      START taking these medications    acetaminophen 650 MG Tbsr  Commonly known as:  TYLENOL  Take 1 tablet (650 mg total) by mouth every 8 (eight) hours.     celecoxib 200 MG capsule  Commonly known as:  CeleBREX  Take 1 capsule (200 mg total) by mouth once daily.     gabapentin 100 MG capsule  Commonly known as:  NEURONTIN  Take 1 capsule (100 mg total) by mouth 3 (three) times daily.     methocarbamol 500 MG Tab  Commonly known as:  ROBAXIN  Take 2 tablets (1,000 mg total) by mouth 3 (three) times daily. for 15 days     oxyCODONE 5 MG immediate release tablet  Commonly known as:  ROXICODONE  Take 1 tablet (5 mg total) by mouth every 4 (four) hours as needed for Pain.        CONTINUE taking these medications    amLODIPine 5 MG tablet  Commonly known as:  NORVASC  Take 5 mg by mouth.     atorvastatin 10 MG tablet  Commonly known as:  LIPITOR  Take 10 mg by mouth once daily.     glimepiride 4 MG tablet  Commonly known as:  AMARYL  Take 4 mg by mouth daily with breakfast.     levothyroxine 50 MCG tablet  Commonly known as:  SYNTHROID  Take 50 mcg by mouth once daily.     losartan 25 MG tablet  Commonly known as:  COZAAR  Take 30 mg by mouth once daily.     metFORMIN 1000 MG tablet  Commonly known as:  GLUCOPHAGE  Take 1,000 mg by mouth 2 (two) times daily with meals.        STOP taking these medications    methylPREDNISolone 4 mg tablet  Commonly known as:  MEDROL DOSEPACK            Derek Lopez MD  Orthopedics  [facility]

## 2019-11-27 NOTE — CONSULTS
"  [facility] - Skilled Nursing Care  Adult Nutrition  Consult Note    SUMMARY     Recommendations    Recommendation/Intervention:   1. Rec Diabetic 2000 kcal diet with low sodium restrictions.   2. Diabetic education upon F/U.    Goals: PO intake > 85 % EEN, EPN daily  Nutrition Goal Status: new  Communication of RD Recs: other (comment)(POC)    Reason for Assessment    Reason For Assessment: consult  Diagnosis: other (see comments)(cervical myelopathy)  Relevant Medical History: DM2, HTN, HLD, Thyroid dz  Interdisciplinary Rounds: did not attend  General Information Comments: s/p posterior cervical spinal fusion and laminectomy on . Pt reports having a good appetite; PO intake 75% of meals. c/o intermittent nausea and constipation, denies vomiting or diarrhea. Pt denies weight loss and states she believes she has gained weight since admitted to the hospital. Pt would like nutrition education on DM2 diet; pt has received education before but was told she could not eat any starchy carbohydrates (bread, rice, pasta). Advised pt she can have these things in moderation and that RD will bring handouts upon F/U. NFPE completed , no s/s of malnutrition. Pt would like to be on low sodium diet as she states the food is too salty.  Nutrition Discharge Planning: d/c on DM2 diet    Nutrition Risk Screen    Nutrition Risk Screen: no indicators present    Nutrition/Diet History    Patient Reported Diet/Restrictions/Preferences: diabetic diet, low salt  Spiritual, Cultural Beliefs, Rastafarian Practices, Values that Affect Care: no    Anthropometrics    Temp: 98.5 °F (36.9 °C)  Height Method: Stated  Height: 5' 4" (162.6 cm)  Height (inches): 64 in  Weight Method: Standard Scale  Weight: 84.3 kg (185 lb 13.6 oz)  Weight (lb): 185.85 lb  Ideal Body Weight (IBW), Female: 120 lb  % Ideal Body Weight, Female (lb): 154.88 lb  BMI (Calculated): 31.9  BMI Grade: 30 - 34.9- obesity - grade I  Usual Body Weight (UBW), k.09 kg  % " Usual Body Weight: 106.81  % Weight Change From Usual Weight: 6.59 %       Lab/Procedures/Meds    Pertinent Labs Reviewed: reviewed  Pertinent Labs Comments: Na 130, Glu 126, Ca 8.3, WBC 19.42  Pertinent Medications Reviewed: reviewed  Pertinent Medications Comments: amlodipine, ascorbic acid, statin, ferrous sulfate, gabapentin, glimepiride, levothyroxine, losartan, metformin, methocarbamol, senna-docusate     Estimated/Assessed Needs    Weight Used For Calorie Calculations: 84.3 kg (185 lb 13.6 oz)  Energy Calorie Requirements (kcal): 1678 kcal/day  Energy Need Method: Leeds-St Jeor(PAL 1.2)  Protein Requirements: 85 - 102 g/day(1.0 - 1.2 g/kg)  Weight Used For Protein Calculations: 84.3 kg (185 lb 13.6 oz)  Fluid Requirements (mL): 1 mL/kcal or PER MD   Estimated Fluid Requirement Method: RDA Method  RDA Method (mL): 1678  CHO Requirement: 247 g/day    Evaluation of Received Nutrient/Fluid Intake    Energy Calories Required: meeting needs  Protein Required: meeting needs  Fluid Required: meeting needs  Comments: LBM 11/25  Tolerance: tolerating  % Intake of Estimated Energy Needs: 75 - 100 %   % Meal Intake: 75 - 100 %     Nutrition Risk    Level of Risk/Frequency of Follow-up: high(1x/week)     Assessment and Plan    Nutrition Problem  Altered nutrition related lab values     Related to (etiology):   DM2     Signs and Symptoms (as evidenced by):   Glucose 126    Interventions:  Collaboration of care with other providers  Nutrition Education - diabetic diet     Nutrition Diagnosis Status:   New    Monitor and Evaluation    Food and Nutrient Intake: energy intake, food and beverage intake  Food and Nutrient Adminstration: diet order  Anthropometric Measurements: weight, weight change, body mass index  Biochemical Data, Medical Tests and Procedures: electrolyte and renal panel, gastrointestinal profile, glucose/endocrine profile, inflammatory profile, lipid profile  Nutrition-Focused Physical Findings: overall  appearance     Malnutrition Assessment    [place] (Muscle Loss): mild depletion  Subcutaneous Fat Loss (Final Summary): well nourished  Muscle Loss Evaluation (Final Summary): well nourished         Nutrition Follow-Up    RD Follow-up?: Yes

## 2019-11-27 NOTE — PLAN OF CARE
Recommendations     Recommendation/Intervention:   1. Rec Diabetic 2000 kcal diet with low sodium restrictions.   2. Diabetic education upon F/U.     Goals: PO intake > 85 % EEN, EPN daily  Nutrition Goal Status: new  Communication of RD Recs: other (comment)(POC)

## 2019-11-27 NOTE — PROGRESS NOTES
[facility]                                  Skilled Nursing Facility                   Progress Note     Admit Date: 11/26/2019  CARLOS TBD   Principal Problem:  Cervical myelopathy   HPI obtained from patient interview and chart review     Chief Complaint: Establish Care/ Initial Visit     HPI:   Mrs Saxena is a  59-year-old female with PMHx of HTN, HLD, T2DM  who presents to SNF following a hospitalization for cord compression s/p elective posterior cervical spinal fusion and laminectomy for spinal stenosis decompression of C3-6.  Admission to SNF for secondary weakness and debility.     Patient has experienced gait abnormality for 1.5 month with history of gait imbalance, hand clumsiness, and hand weakness. She was evaluated by neurology.  Out patient MRI of C-spine done on 11/8  showed signficant cord compression and patient was called and told to emergently present to ED on 11/11. She was discharged with medrol dosepak to return for elective surgery.  Patient went to [facility] on 11/18 for preop appointment with Dr. Cruz. Patient was admitted 11/19 and underwent posterior cervical spinal fusion and laminectomy for spinal stenosis decompression of C3-6 . Patient's operative and post-operative course was unremarkable; ESBL 100ml.  Postop patient will remain WBAT with cervical collar.     She states she has lower neck pain that radiates across her shoulders. She additionally complains of low back pain in certain positions. She states her BUE  paraesthesias have resolved. She finds the current pain regimen adequate over all.      Patient will be treated at [facility] with PT and OT to improve functional status and ability to perform ADLs.     Past Medical History: Patient has a past medical history of Diabetes mellitus, type 2, Hyperlipidemia, Hypertension, and Thyroid disease.    Past Surgical History: Patient has a past surgical  history that includes  section; Abdominal surgery; and Fracture surgery.    Social History: Patient reports that she has never smoked. She has never used smokeless tobacco. She reports that she drank alcohol. She reports that she does not use drugs.    Family History:  No significant family history to report    Allergies: Patient has No Known Allergies.    ROS  Constitutional: Negative for fever or fatigue.   Eyes: Negative for blurred vision, double vision and discharge.   Respiratory: Negative for cough, shortness of breath and wheezing.    Cardiovascular: Negative for chest pain, palpitations.+ leg swelling.   Gastrointestinal: Negative for abdominal pain, constipation, diarrhea, nausea and vomiting.   Genitourinary: Negative for dysuria, frequency and urgency.   Musculoskeletal:  + generalized weakness, neck pain.   Skin: Negative for itching and rash.   Neurological: Negative for dizziness, speech change, and headaches.   Psychiatric/Behavioral: Negative for depression. The patient is not nervous/anxious.      PEx  Temp:  [98.4 °F (36.9 °C)-98.5 °F (36.9 °C)]   Pulse:  [87-90]   Resp:  [16-18]   BP: (152-161)/(66-74)   SpO2:  [96 %]      Constitutional: Patient appears well-developed and in no distress   HENT:   Head: Normocephalic and atraumatic.   Eyes: Pupils are equal, round  Neck:  C-collar in place, surgical incision with Prevena wound VAC in place  Cardiovascular: Normal rate, regular rhythm and normal heart sounds.    Pulmonary/Chest: Effort normal and breath sounds are clear  Abdominal: Soft. Bowel sounds are normal.   Musculoskeletal: Normal range of motion.   Neurological: Alert and oriented to person, place, and time.   Psychiatric: Normal mood and affect. Behavior is normal.   Skin: Skin is warm and dry. Full skin assessment completed no skin breakdown noted. Surgical incision to back of neck; unable to visualize due to incisional Prevena wound VAC in place; dressing to be removed by Ortho  at postop appointment only    No results for input(s): GLUCOSE, NA, K, CL, CO2, BUN, CREATININE, MG in the last 24 hours.    Invalid input(s):  CALCIUM    No results for input(s): WBC, RBC, HGB, HCT, PLT, MCV, MCH, MCHC in the last 24 hours.      Assessment and Plan:     Cervical myelopathy  S/p C3-6 laminectomy and fusion  Cervical DJD  - Follow up with orthopedics as scheduled- 12/2 at 815AM  - PT/OT: WBAT in cervical collar    Acute postop pain  - Pain control: acetaminophen 650 mg q8h, celecoxib 200 mg daily, gabapentin 100 mg TID, methocarbamol 1000 mg TID, oxycodone 5 mg q5h prn, Methylsalicylate menthol to shoulders and gluteus medius    Debility  - Continue with PT/OT for gait training and strengthening and restoration of ADL's   - Encourage mobility, OOB in chair, and early ambulation as appropriate  - Fall precautions   - Monitor for bowel and bladder dysfunction  - Monitor for and prevent skin breakdown and pressure ulcers  - Continue DVT prophylaxis with heparin 5000 units subq TID     Essential hypertension  - Continue losartan 25 mg daily and amlodipine 5 mg daily     Expected blood loss anemia  Unspecified chronic anemia  Leukocytosis  - continue Ferrous sulfate 325 mg and ascorbic acid nightly  - Iron levels, vitamin D level  - Leukocytosis present on routine labs - follow up with PCP as outpatient for further evaluation and treatment as needed     Hypothyroidism   - continue levothyroxine 50 mcg daily     DM II with Hyperlipidemia  - Atorvastatin 10 mg daily     DM II with HTN and CKD II  - initiated home glimepiride 4 mg daily and metformin 1 g BID  - initiated SSI and accuchecks while here    Future Appointments   Date Time Provider Department Center   12/2/2019  8:15 AM Quyen Ramírez PA-C [facility] SPINE [address]   12/5/2019 11:00 AM Edward Hernandez MD [facility] IM [address] [facility]   12/10/2019  9:15 AM Quyen Ramírez PA-C [facility] SPINE [address]   12/23/2019  7:20 AM Janette Church MD [facility]  [place]       I certify that SNF services are required to be given on an inpatient basis because Lala Saxena needs for skilled nursing care and/or skilled rehabilitation are required on a daily basis and such services can only practically be provided in a skilled nursing facility setting and are for an ongoing condition for which she received inpatient care in the hospital.     Total time of the visit 68 minutes [id number]  Non physical exam/ non charting time: 50 minutes   Description of non physical exam/non charting time: counseling patient on clinical conditions and therapies provided regarding postop care, pain control regimen, importance of a bowel regimen, follow-up care, diabetes regimen and the beginning of discharge planning  Extensive chart review completed including all consultation notes.  All pertinent laboratory and radiographical images reviewed.        Janette Resendiz NP      DOS: 11/27/2019       Patient note was created using MModal Dictation.  Any errors in syntax or even information may not have been identified and edited on initial review prior to signing this note.

## 2019-11-28 LAB — POCT GLUCOSE: 98 MG/DL (ref 70–110)

## 2019-11-28 PROCEDURE — 25000003 PHARM REV CODE 250: Performed by: INTERNAL MEDICINE

## 2019-11-28 PROCEDURE — 25000003 PHARM REV CODE 250: Performed by: STUDENT IN AN ORGANIZED HEALTH CARE EDUCATION/TRAINING PROGRAM

## 2019-11-28 PROCEDURE — 11000004 HC SNF PRIVATE

## 2019-11-28 PROCEDURE — 63600175 PHARM REV CODE 636 W HCPCS: Performed by: INTERNAL MEDICINE

## 2019-11-28 RX ORDER — LACTULOSE 10 G/15ML
15 SOLUTION ORAL EVERY 6 HOURS PRN
Status: DISCONTINUED | OUTPATIENT
Start: 2019-11-28 | End: 2019-12-03 | Stop reason: HOSPADM

## 2019-11-28 RX ADMIN — METHOCARBAMOL TABLETS 1000 MG: 500 TABLET, COATED ORAL at 02:11

## 2019-11-28 RX ADMIN — GLIMEPIRIDE 4 MG: 1 TABLET ORAL at 09:11

## 2019-11-28 RX ADMIN — CELECOXIB 200 MG: 100 CAPSULE ORAL at 09:11

## 2019-11-28 RX ADMIN — LEVOTHYROXINE SODIUM 50 MCG: 50 TABLET ORAL at 06:11

## 2019-11-28 RX ADMIN — METFORMIN HYDROCHLORIDE 1000 MG: 500 TABLET, FILM COATED ORAL at 05:11

## 2019-11-28 RX ADMIN — MENTHOL, METHYL SALICYLATE: 10; 15 CREAM TOPICAL at 09:11

## 2019-11-28 RX ADMIN — ACETAMINOPHEN 650 MG: 325 TABLET ORAL at 09:11

## 2019-11-28 RX ADMIN — GABAPENTIN 100 MG: 100 CAPSULE ORAL at 09:11

## 2019-11-28 RX ADMIN — SENNOSIDES AND DOCUSATE SODIUM 1 TABLET: 8.6; 5 TABLET ORAL at 09:11

## 2019-11-28 RX ADMIN — ATORVASTATIN CALCIUM 10 MG: 10 TABLET, FILM COATED ORAL at 09:11

## 2019-11-28 RX ADMIN — Medication 250 MG: at 09:11

## 2019-11-28 RX ADMIN — OXYCODONE HYDROCHLORIDE 5 MG: 10 TABLET ORAL at 06:11

## 2019-11-28 RX ADMIN — METHOCARBAMOL TABLETS 1000 MG: 500 TABLET, COATED ORAL at 09:11

## 2019-11-28 RX ADMIN — HEPARIN SODIUM 5000 UNITS: 5000 INJECTION, SOLUTION INTRAVENOUS; SUBCUTANEOUS at 09:11

## 2019-11-28 RX ADMIN — HEPARIN SODIUM 5000 UNITS: 5000 INJECTION, SOLUTION INTRAVENOUS; SUBCUTANEOUS at 02:11

## 2019-11-28 RX ADMIN — ACETAMINOPHEN 650 MG: 325 TABLET ORAL at 06:11

## 2019-11-28 RX ADMIN — HEPARIN SODIUM 5000 UNITS: 5000 INJECTION, SOLUTION INTRAVENOUS; SUBCUTANEOUS at 06:11

## 2019-11-28 RX ADMIN — LOSARTAN POTASSIUM 25 MG: 25 TABLET, FILM COATED ORAL at 09:11

## 2019-11-28 RX ADMIN — ACETAMINOPHEN 650 MG: 325 TABLET ORAL at 02:11

## 2019-11-28 RX ADMIN — METFORMIN HYDROCHLORIDE 1000 MG: 500 TABLET, FILM COATED ORAL at 09:11

## 2019-11-28 RX ADMIN — GABAPENTIN 100 MG: 100 CAPSULE ORAL at 02:11

## 2019-11-28 RX ADMIN — OXYCODONE HYDROCHLORIDE 5 MG: 10 TABLET ORAL at 05:11

## 2019-11-28 RX ADMIN — AMLODIPINE BESYLATE 5 MG: 5 TABLET ORAL at 09:11

## 2019-11-28 RX ADMIN — MENTHOL, METHYL SALICYLATE: 10; 15 CREAM TOPICAL at 02:11

## 2019-11-28 NOTE — PLAN OF CARE
Plan of care reviewed with patient. Reviewed medication regimen with patient. Patient verbalized understanding. Will continue to monitor.

## 2019-11-29 LAB — POCT GLUCOSE: 77 MG/DL (ref 70–110)

## 2019-11-29 PROCEDURE — 11000004 HC SNF PRIVATE

## 2019-11-29 PROCEDURE — 97535 SELF CARE MNGMENT TRAINING: CPT

## 2019-11-29 PROCEDURE — 97530 THERAPEUTIC ACTIVITIES: CPT

## 2019-11-29 PROCEDURE — 25000003 PHARM REV CODE 250: Performed by: INTERNAL MEDICINE

## 2019-11-29 PROCEDURE — 97116 GAIT TRAINING THERAPY: CPT

## 2019-11-29 PROCEDURE — 25000003 PHARM REV CODE 250: Performed by: STUDENT IN AN ORGANIZED HEALTH CARE EDUCATION/TRAINING PROGRAM

## 2019-11-29 PROCEDURE — 97110 THERAPEUTIC EXERCISES: CPT

## 2019-11-29 PROCEDURE — 63600175 PHARM REV CODE 636 W HCPCS: Performed by: INTERNAL MEDICINE

## 2019-11-29 RX ORDER — IBUPROFEN 200 MG
24 TABLET ORAL
Status: DISCONTINUED | OUTPATIENT
Start: 2019-11-29 | End: 2019-12-03 | Stop reason: HOSPADM

## 2019-11-29 RX ORDER — GLUCAGON 1 MG
1 KIT INJECTION
Status: DISCONTINUED | OUTPATIENT
Start: 2019-11-29 | End: 2019-12-03 | Stop reason: HOSPADM

## 2019-11-29 RX ORDER — IBUPROFEN 200 MG
16 TABLET ORAL
Status: DISCONTINUED | OUTPATIENT
Start: 2019-11-29 | End: 2019-12-03 | Stop reason: HOSPADM

## 2019-11-29 RX ADMIN — CELECOXIB 200 MG: 100 CAPSULE ORAL at 09:11

## 2019-11-29 RX ADMIN — GLIMEPIRIDE 4 MG: 1 TABLET ORAL at 09:11

## 2019-11-29 RX ADMIN — GABAPENTIN 100 MG: 100 CAPSULE ORAL at 09:11

## 2019-11-29 RX ADMIN — MENTHOL, METHYL SALICYLATE: 10; 15 CREAM TOPICAL at 04:11

## 2019-11-29 RX ADMIN — METHOCARBAMOL TABLETS 1000 MG: 500 TABLET, COATED ORAL at 09:11

## 2019-11-29 RX ADMIN — METFORMIN HYDROCHLORIDE 1000 MG: 500 TABLET, FILM COATED ORAL at 04:11

## 2019-11-29 RX ADMIN — MENTHOL, METHYL SALICYLATE: 10; 15 CREAM TOPICAL at 09:11

## 2019-11-29 RX ADMIN — ACETAMINOPHEN 650 MG: 325 TABLET ORAL at 09:11

## 2019-11-29 RX ADMIN — Medication 250 MG: at 09:11

## 2019-11-29 RX ADMIN — SENNOSIDES AND DOCUSATE SODIUM 1 TABLET: 8.6; 5 TABLET ORAL at 09:11

## 2019-11-29 RX ADMIN — GABAPENTIN 100 MG: 100 CAPSULE ORAL at 02:11

## 2019-11-29 RX ADMIN — LOSARTAN POTASSIUM 25 MG: 25 TABLET, FILM COATED ORAL at 07:11

## 2019-11-29 RX ADMIN — METHOCARBAMOL TABLETS 1000 MG: 500 TABLET, COATED ORAL at 02:11

## 2019-11-29 RX ADMIN — AMLODIPINE BESYLATE 5 MG: 5 TABLET ORAL at 07:11

## 2019-11-29 RX ADMIN — HEPARIN SODIUM 5000 UNITS: 5000 INJECTION, SOLUTION INTRAVENOUS; SUBCUTANEOUS at 02:11

## 2019-11-29 RX ADMIN — ACETAMINOPHEN 650 MG: 325 TABLET ORAL at 02:11

## 2019-11-29 RX ADMIN — METFORMIN HYDROCHLORIDE 1000 MG: 500 TABLET, FILM COATED ORAL at 09:11

## 2019-11-29 RX ADMIN — HEPARIN SODIUM 5000 UNITS: 5000 INJECTION, SOLUTION INTRAVENOUS; SUBCUTANEOUS at 09:11

## 2019-11-29 RX ADMIN — ATORVASTATIN CALCIUM 10 MG: 10 TABLET, FILM COATED ORAL at 09:11

## 2019-11-29 NOTE — PT/OT/SLP PROGRESS
Occupational Therapy  Treatment    Lala Saxena   MRN: [id number]   Admitting Diagnosis: Cervical myelopathy    OT Date of Treatment: 11/29/19       Billable Minutes:  Self Care/Home Management 46    General Precautions: Standard, fall  Orthopedic Precautions: spinal precautions  Braces: Aspen collar    Spiritual, Cultural Beliefs, Uatsdin Practices, Values that Affect Care: no    Subjective:  Communicated with patient prior to session.  Agreeable to ADLs  Patient able to recall 2/3 spinal precaution.     Pain/Comfort  Pain Rating 1: 0/10  Pain Rating Post-Intervention 1: 0/10    Objective:  Patient found with: cervical collar, wound vac  Seated in bed side chair with  present.    Occupational Performance:    Functional Mobility/Transfers:  · Patient completed Sit <> Stand Transfer with contact guard assistance  with  rolling walker   · Patient completed Toilet Transfer bed side chair > toilet Step Transfer technique with stand by assistance and contact guard assistance with  rolling walker  · Functional Mobility: Patient ambulate din room with RW with SBA/CGA ~20-25 feet for ADL participation.     Activities of Daily Living:  · Grooming: minimum assistance to apply toothpaste to toothbrush seated in w/c at sink level  · Bathing: stand by assistance at sink level  · Upper Body Dressing: moderate assistance don/doff button up shirt  · Lower Body Dressing: moderate assistance don/doff brief and pants using AE  · Toileting: stand by assistance on BSC over toilet     AMPA 6 Click:  AMPA Total Score: 14    Additional Treatment:  Patient educated on LB dressing AE.     Patient left up in chair with call button in reach and nurse present    ASSESSMENT:  Lala Saxena is a 59 y.o. female with a medical diagnosis of Cervical myelopathy and presents with the deficits listed below. Patient tolerated session without incident and will benefit from continued OT services to increase safety and independence with  ADLs, t/fs, and functional mobility.     Rehab identified problem list/impairments: weakness, impaired endurance, impaired self care skills, impaired functional mobilty, gait instability, impaired balance, decreased upper extremity function, decreased lower extremity function, decreased safety awareness, pain, decreased ROM, impaired fine motor    Rehab potential is good    Activity tolerance: Good    Discharge recommendations: home health OT     Barriers to discharge: Decreased caregiver support     Equipment recommendations: (TBD)     GOALS:   Multidisciplinary Problems     Occupational Therapy Goals        Problem: Occupational Therapy Goal    Goal Priority Disciplines Outcome Interventions   Occupational Therapy Goal     OT, PT/OT Ongoing, Progressing    Description:  Goals to be met by: 12/18/19     Patient will increase functional independence with ADLs by performing:    Feeding with Modified [place].  UE Dressing with Stand-by Assistance.  LE Dressing with Minimal Assistance.  Grooming while seated with Stand-by Assistance.  Toileting from toilet with Minimal Assistance for hygiene and clothing management.   Bathing from  sitting at sink with Moderate Assistance.  Supine to sit with Stand-by Assistance.  Step transfer with Stand-by Assistance with RW  Upper extremity exercise program x10 reps per set, with supervision.  Caregiver will be competent with (A) with self care assist and with functional transfers.                      Plan:  Patient to be seen 5 x/week, 6 x/week to address the above listed problems via self-care/home management, therapeutic activities, therapeutic exercises, neuromuscular re-education  Plan of Care expires: 12/27/19  Plan of Care reviewed with: patient  The NICHOLAS and DEL have collaborated and discussed the patient's status, treatment plan and progress toward established goals.   JULY Lakhani 11/29/2019   JULY Lakhani  11/29/2019

## 2019-11-29 NOTE — NURSING
Instructed  in room on skilled unit routine as told to pt. Told him that the NP would be in Monday to discuss care.  and pt agreed and are satisfied with our discussion

## 2019-11-29 NOTE — NURSING
"Back to room to answer any other questions. Pt verbalized understanding but also said "this isn't what I thought it would be". I requested that she stay until at least Monday when she could speak with SHAHANA Batista who has been following her care. She will speak with me later   "

## 2019-11-29 NOTE — NURSING
"To pt's room for medication admin. Pt stated "I haven't seen a doctor yet and I really want to go home" I described Dr. Munoz and pt stated she did see MD. I informed pt "the doctor sees the patient next day after admit and then patients are assigned to NP who sees patients 2-3 days a week" I also informed pt that she was here for exercise and monitoring of the wound vac which if she went home it could become infected. Here therapy is 5 days a week and at home only 3. I also told pt she could go home AMA. She stated she would make some phone calls and let me know.  "

## 2019-11-29 NOTE — PT/OT/SLP PROGRESS
"Physical Therapy  Treatment    Lala Saxena   MRN: [id number]   Admitting Diagnosis: Cervical myelopathy    PT Received On: 11/29/19          Billable Minutes:  Gait Training 15, Therapeutic Activity 23 and Therapeutic Exercise 15    Treatment Type: Treatment  PT/PTA: PTA     PTA Visit Number: 2       General Precautions: Standard, fall  Orthopedic Precautions: spinal precautions   Braces: Aspen collar    Spiritual, Cultural Beliefs, Baptism Practices, Values that Affect Care: no    Subjective:  "I'm good" Pt agreebale to therapy    Pain/Comfort  Pain Rating 1: 0/10  Pain Rating Post-Intervention 1: 0/10    Objective:  Patient found in bed side chair with Aspen collar donned       AM-PAC 6 CLICK MOBILITY  Total Score:17    Transfers:  Sit<>Stand: t/f chair, t/f wc S RW while maintaining spinal precautions  Stand Pivot Transfer: chair<>wc S RW while maintaining spinal precautions    Gait:  Amb 2 trials 120' each RW SBA, CGA during object negotiation, edu'd on RW mgmt especially on turns, slow filiberto step through gait pattern    Advanced Gait:  Stairs: 4 steps BHR CGA edu'd on tech  Curb Step: 4" RW CGA edu'd on tech     Therex:  2 x 10 AP, LAQ, HF, abd/add, GS    Balance:  Dynamic standing at counter unilateral to no UE support while performing card matching activity 5:40 S, edu'd on tech for maintaining spinal precautions    Additional Treatment:  Extensive discussion and education given to patient and  throughout treatment including safety/tech and S/A required upon d/c, HH needs,DME needs, scheduling family training, increased OOB activity with questions answered or directed to appropriate person  Patient educated on importance of increased time out of bed and YUNIOR throughout the day    Patient left up in chair with call button in reach.    Assessment:  Lala Saxena is a 59 y.o. female with a medical diagnosis of Cervical myelopathy.  Patient tolerated treatment well focusing on transfers, gait, " therex and standing balance/tolerance. Patient will continue to improve with skilled physical therapy services in order to return to functional baselin.    Rehab identified problem list/impairments: weakness, impaired endurance, impaired sensation, impaired functional mobilty, gait instability, impaired balance, decreased lower extremity function, decreased upper extremity function, orthopedic precautions    Rehab potential is good.    Activity tolerance: Good    Discharge recommendations: home health PT     Barriers to discharge: Decreased caregiver support    Equipment recommendations: none     GOALS:   Multidisciplinary Problems     Physical Therapy Goals        Problem: Physical Therapy Goal    Goal Priority Disciplines Outcome Goal Variances Interventions   Physical Therapy Goal     PT, PT/OT Ongoing, Progressing     Description:  Goals to be met by: 2019    Patient will increase functional independence with mobility by performin. Supine to sit with Modified [place]  2. Sit to supine with Modified [place]  3. Rolling to Left and Right with Modified [place].  4. Sit to stand transfer with Modified [place]  5. Bed to chair transfer with Supervision using Rolling Walker  6. Gait  x 120 feet with Supervision using Rolling Walker.   7. Ascend/descend 4 steps with bilateral Handrails Stand-by Assistance   8. Ascend/Descend 4 inch curb step with Supervision using Rolling Walker.  9. Stand for 5 minutes with Supervision using Rolling Walker while performing dynamic activity met 2019  10.Perform car transfer (actual or simulated) w/ RW and SBA                       PLAN:    Patient to be seen 5 x/week  to address the above listed problems via gait training, therapeutic activities, therapeutic exercises, neuromuscular re-education  Plan of Care expires: 19  Plan of Care reviewed with: patient    Minna Reaves, PTA  2019

## 2019-11-29 NOTE — PLAN OF CARE
Problem: Adult Inpatient Plan of Care  Goal: Plan of Care Review  Outcome: Ongoing, Progressing     Problem: Fall Injury Risk  Goal: Absence of Fall and Fall-Related Injury  Outcome: Ongoing, Progressing     Problem: Skin Injury Risk Increased  Goal: Skin Health and Integrity  Outcome: Ongoing, Progressing     Afebrile. Denies pain. No new skin wounds. Frequent toileting offered with assistance.  Plan of care reviewed with patient . Will continue to frequently monitor for pain and safety.

## 2019-11-29 NOTE — PLAN OF CARE
Repositions minimal assist, no new skin breakdowns noted. Cervical collar in place, wound vac in place to posterior neck/back, suction confirmed. Afebrile. Monitored for pain and safety. Safety maintained. Denies pain

## 2019-11-29 NOTE — NURSING
"Patient refused all 6:00 am medications. Expressed the importance of medications. Patient stated "she does not feel like taking any medicine. Charge nurse, [place] notified.   "

## 2019-11-29 NOTE — PLAN OF CARE
Problem: Physical Therapy Goal  Goal: Physical Therapy Goal  Description  Goals to be met by: 2019    Patient will increase functional independence with mobility by performin. Supine to sit with Modified [place]  2. Sit to supine with Modified [place]  3. Rolling to Left and Right with Modified [place].  4. Sit to stand transfer with Modified [place]  5. Bed to chair transfer with Supervision using Rolling Walker  6. Gait  x 120 feet with Supervision using Rolling Walker.   7. Ascend/descend 4 steps with bilateral Handrails Stand-by Assistance   8. Ascend/Descend 4 inch curb step with Supervision using Rolling Walker.  9. Stand for 5 minutes with Supervision using Rolling Walker while performing dynamic activity met 2019  10.Perform car transfer (actual or simulated) w/ RW and SBA      Outcome: Ongoing, Progressing

## 2019-11-30 LAB
POCT GLUCOSE: 103 MG/DL (ref 70–110)
POCT GLUCOSE: 116 MG/DL (ref 70–110)
POCT GLUCOSE: 119 MG/DL (ref 70–110)
POCT GLUCOSE: 77 MG/DL (ref 70–110)
POCT GLUCOSE: 92 MG/DL (ref 70–110)

## 2019-11-30 PROCEDURE — 97110 THERAPEUTIC EXERCISES: CPT

## 2019-11-30 PROCEDURE — 97116 GAIT TRAINING THERAPY: CPT

## 2019-11-30 PROCEDURE — 25000003 PHARM REV CODE 250: Performed by: STUDENT IN AN ORGANIZED HEALTH CARE EDUCATION/TRAINING PROGRAM

## 2019-11-30 PROCEDURE — 63600175 PHARM REV CODE 636 W HCPCS: Performed by: INTERNAL MEDICINE

## 2019-11-30 PROCEDURE — 25000003 PHARM REV CODE 250: Performed by: INTERNAL MEDICINE

## 2019-11-30 PROCEDURE — 11000004 HC SNF PRIVATE

## 2019-11-30 PROCEDURE — 97530 THERAPEUTIC ACTIVITIES: CPT

## 2019-11-30 RX ADMIN — HEPARIN SODIUM 5000 UNITS: 5000 INJECTION, SOLUTION INTRAVENOUS; SUBCUTANEOUS at 01:11

## 2019-11-30 RX ADMIN — METHOCARBAMOL TABLETS 1000 MG: 500 TABLET, COATED ORAL at 08:11

## 2019-11-30 RX ADMIN — METHOCARBAMOL TABLETS 1000 MG: 500 TABLET, COATED ORAL at 09:11

## 2019-11-30 RX ADMIN — ACETAMINOPHEN 650 MG: 325 TABLET ORAL at 01:11

## 2019-11-30 RX ADMIN — ACETAMINOPHEN 650 MG: 325 TABLET ORAL at 10:11

## 2019-11-30 RX ADMIN — MENTHOL, METHYL SALICYLATE: 10; 15 CREAM TOPICAL at 08:11

## 2019-11-30 RX ADMIN — METFORMIN HYDROCHLORIDE 1000 MG: 500 TABLET, FILM COATED ORAL at 05:11

## 2019-11-30 RX ADMIN — ATORVASTATIN CALCIUM 10 MG: 10 TABLET, FILM COATED ORAL at 09:11

## 2019-11-30 RX ADMIN — METHOCARBAMOL TABLETS 1000 MG: 500 TABLET, COATED ORAL at 03:11

## 2019-11-30 RX ADMIN — CELECOXIB 200 MG: 100 CAPSULE ORAL at 09:11

## 2019-11-30 RX ADMIN — MENTHOL, METHYL SALICYLATE: 10; 15 CREAM TOPICAL at 09:11

## 2019-11-30 RX ADMIN — LOSARTAN POTASSIUM 25 MG: 25 TABLET, FILM COATED ORAL at 09:11

## 2019-11-30 RX ADMIN — ACETAMINOPHEN 650 MG: 325 TABLET ORAL at 04:11

## 2019-11-30 RX ADMIN — HEPARIN SODIUM 5000 UNITS: 5000 INJECTION, SOLUTION INTRAVENOUS; SUBCUTANEOUS at 05:11

## 2019-11-30 RX ADMIN — HEPARIN SODIUM 5000 UNITS: 5000 INJECTION, SOLUTION INTRAVENOUS; SUBCUTANEOUS at 10:11

## 2019-11-30 RX ADMIN — OXYCODONE HYDROCHLORIDE 5 MG: 10 TABLET ORAL at 04:11

## 2019-11-30 RX ADMIN — GABAPENTIN 100 MG: 100 CAPSULE ORAL at 03:11

## 2019-11-30 RX ADMIN — OXYCODONE HYDROCHLORIDE 5 MG: 10 TABLET ORAL at 11:11

## 2019-11-30 RX ADMIN — SENNOSIDES AND DOCUSATE SODIUM 1 TABLET: 8.6; 5 TABLET ORAL at 08:11

## 2019-11-30 RX ADMIN — FERROUS SULFATE TAB EC 325 MG (65 MG FE EQUIVALENT) 325 MG: 325 (65 FE) TABLET DELAYED RESPONSE at 08:11

## 2019-11-30 RX ADMIN — METFORMIN HYDROCHLORIDE 1000 MG: 500 TABLET, FILM COATED ORAL at 09:11

## 2019-11-30 RX ADMIN — AMLODIPINE BESYLATE 5 MG: 5 TABLET ORAL at 09:11

## 2019-11-30 RX ADMIN — SENNOSIDES AND DOCUSATE SODIUM 1 TABLET: 8.6; 5 TABLET ORAL at 09:11

## 2019-11-30 RX ADMIN — GLIMEPIRIDE 4 MG: 1 TABLET ORAL at 09:11

## 2019-11-30 RX ADMIN — LEVOTHYROXINE SODIUM 50 MCG: 50 TABLET ORAL at 05:11

## 2019-11-30 RX ADMIN — MENTHOL, METHYL SALICYLATE: 10; 15 CREAM TOPICAL at 03:11

## 2019-11-30 RX ADMIN — GABAPENTIN 100 MG: 100 CAPSULE ORAL at 09:11

## 2019-11-30 RX ADMIN — GABAPENTIN 100 MG: 100 CAPSULE ORAL at 08:11

## 2019-11-30 RX ADMIN — Medication 250 MG: at 08:11

## 2019-11-30 NOTE — PT/OT/SLP PROGRESS
"Physical Therapy  Treatment    Lala Saxena   MRN: [id number]   Admitting Diagnosis: Cervical myelopathy    PT Received On: 11/30/19          Billable Minutes:  Gait Training 15 and Therapeutic Activity 23    Treatment Type: Treatment  PT/PTA: PTA     PTA Visit Number: 3       General Precautions: Standard, fall  Orthopedic Precautions: spinal precautions   Braces: Aspen collar    Spiritual, Cultural Beliefs, Roman Catholic Practices, Values that Affect Care: no    Subjective:  "I'm good" Pt agreebale to therapy    Pain/Comfort  Pain Rating 1: 0/10  Pain Rating Post-Intervention 1: 0/10    Objective:  Patient found in  in room with Aspen collar and  present for family training       AM-PAC 6 CLICK MOBILITY  Total Score:17    Bed Mobility:  edu'd on tech, pt stated understanding will practice next visit    Transfers:  Sit<>Stand: t/f wc S RW, edu'd on tech pt and  stated and demo'd understanding  Car Transfer: SBA RW, patient and  edu'd on tech both stated and demonstrated understanding    Gait:  Amb 3 trials 120' and 2 x 100' S RW,  edu'd on tech for A/S, stated and demo'd understanding     Advanced Gait:  Stairs: 4 steps BHR SBA, vcs for tech,  edu'd on tech for A/S, stated and demo'd understanding  Curb Step: 4" RW CGA vcs for tech,  edu'd on tech for A/S, stated and demo'd understanding    Balance:  No LOB noted throughout session    Additional Treatment:  Patient and  educated on importance of increased time out of bed and YUNIOR throughout the day    Discussed HH needs, DME needs and A/S required for activity upon d/c    Patient left up in chair with call button in reach.    Assessment:  Lala Saxena is a 59 y.o. female with a medical diagnosis of Cervical myelopathy.  Patient tolerated treatment well focusing on transfers, gait and therex.  present for family training, demo'd and stated understanding of A/S required upon d/c. Patient will continue to " improve with skilled physical therapy services in order to return to functional baseline.    Rehab identified problem list/impairments: weakness, impaired endurance, impaired sensation, impaired functional mobilty, gait instability, impaired balance, decreased lower extremity function, decreased upper extremity function, orthopedic precautions    Rehab potential is good.    Activity tolerance: Good    Discharge recommendations: home health PT     Barriers to discharge: Decreased caregiver support    Equipment recommendations: none     GOALS:   Multidisciplinary Problems     Physical Therapy Goals        Problem: Physical Therapy Goal    Goal Priority Disciplines Outcome Goal Variances Interventions   Physical Therapy Goal     PT, PT/OT Ongoing, Progressing     Description:  Goals to be met by: 2019    Patient will increase functional independence with mobility by performin. Supine to sit with Modified [place]  2. Sit to supine with Modified [place]  3. Rolling to Left and Right with Modified [place].  4. Sit to stand transfer with Modified [place]  5. Bed to chair transfer with Supervision using Rolling Walker  6. Gait  x 120 feet with Supervision using Rolling Walker. Met 2019  7. Ascend/descend 4 steps with bilateral Handrails Stand-by Assistance met 2019  8. Ascend/Descend 4 inch curb step with Supervision using Rolling Walker.  9. Stand for 5 minutes with Supervision using Rolling Walker while performing dynamic activity met 2019  10.Perform car transfer (actual or simulated) w/ RW and SBA met 2019                        PLAN:    Patient to be seen 5 x/week  to address the above listed problems via gait training, therapeutic activities, therapeutic exercises, neuromuscular re-education  Plan of Care expires: 19  Plan of Care reviewed with: patient    Minna Reaves, PTA  2019

## 2019-11-30 NOTE — TREATMENT PLAN
Rehab Services' DME recommendations    Lala Washington  MRN: [id number]    [x] Home health PT and OT     Minna Reaves, PTA 11/30/2019

## 2019-11-30 NOTE — PLAN OF CARE
Problem: Physical Therapy Goal  Goal: Physical Therapy Goal  Description  Goals to be met by: 2019    Patient will increase functional independence with mobility by performin. Supine to sit with Modified [place]  2. Sit to supine with Modified [place]  3. Rolling to Left and Right with Modified [place].  4. Sit to stand transfer with Modified [place]  5. Bed to chair transfer with Supervision using Rolling Walker  6. Gait  x 120 feet with Supervision using Rolling Walker. Met 2019  7. Ascend/descend 4 steps with bilateral Handrails Stand-by Assistance met 2019  8. Ascend/Descend 4 inch curb step with Supervision using Rolling Walker.  9. Stand for 5 minutes with Supervision using Rolling Walker while performing dynamic activity met 2019  10.Perform car transfer (actual or simulated) w/ RW and SBA met 2019       Outcome: Ongoing, Progressing

## 2019-12-01 LAB
POCT GLUCOSE: 113 MG/DL (ref 70–110)
POCT GLUCOSE: 119 MG/DL (ref 70–110)
POCT GLUCOSE: 312 MG/DL (ref 70–110)
POCT GLUCOSE: 71 MG/DL (ref 70–110)

## 2019-12-01 PROCEDURE — 11000004 HC SNF PRIVATE

## 2019-12-01 PROCEDURE — 25000003 PHARM REV CODE 250: Performed by: INTERNAL MEDICINE

## 2019-12-01 PROCEDURE — 97112 NEUROMUSCULAR REEDUCATION: CPT

## 2019-12-01 PROCEDURE — 63600175 PHARM REV CODE 636 W HCPCS: Performed by: INTERNAL MEDICINE

## 2019-12-01 PROCEDURE — 25000003 PHARM REV CODE 250: Performed by: STUDENT IN AN ORGANIZED HEALTH CARE EDUCATION/TRAINING PROGRAM

## 2019-12-01 PROCEDURE — 97535 SELF CARE MNGMENT TRAINING: CPT

## 2019-12-01 RX ADMIN — ACETAMINOPHEN 650 MG: 325 TABLET ORAL at 03:12

## 2019-12-01 RX ADMIN — LOSARTAN POTASSIUM 25 MG: 25 TABLET, FILM COATED ORAL at 10:12

## 2019-12-01 RX ADMIN — GLIMEPIRIDE 4 MG: 1 TABLET ORAL at 10:12

## 2019-12-01 RX ADMIN — METHOCARBAMOL TABLETS 1000 MG: 500 TABLET, COATED ORAL at 10:12

## 2019-12-01 RX ADMIN — GABAPENTIN 100 MG: 100 CAPSULE ORAL at 10:12

## 2019-12-01 RX ADMIN — HEPARIN SODIUM 5000 UNITS: 5000 INJECTION, SOLUTION INTRAVENOUS; SUBCUTANEOUS at 03:12

## 2019-12-01 RX ADMIN — LEVOTHYROXINE SODIUM 50 MCG: 50 TABLET ORAL at 06:12

## 2019-12-01 RX ADMIN — ACETAMINOPHEN 650 MG: 325 TABLET ORAL at 09:12

## 2019-12-01 RX ADMIN — METHOCARBAMOL TABLETS 1000 MG: 500 TABLET, COATED ORAL at 03:12

## 2019-12-01 RX ADMIN — CELECOXIB 200 MG: 100 CAPSULE ORAL at 10:12

## 2019-12-01 RX ADMIN — HEPARIN SODIUM 5000 UNITS: 5000 INJECTION, SOLUTION INTRAVENOUS; SUBCUTANEOUS at 09:12

## 2019-12-01 RX ADMIN — METFORMIN HYDROCHLORIDE 1000 MG: 500 TABLET, FILM COATED ORAL at 10:12

## 2019-12-01 RX ADMIN — SENNOSIDES AND DOCUSATE SODIUM 1 TABLET: 8.6; 5 TABLET ORAL at 10:12

## 2019-12-01 RX ADMIN — HEPARIN SODIUM 5000 UNITS: 5000 INJECTION, SOLUTION INTRAVENOUS; SUBCUTANEOUS at 06:12

## 2019-12-01 RX ADMIN — METHOCARBAMOL TABLETS 1000 MG: 500 TABLET, COATED ORAL at 09:12

## 2019-12-01 RX ADMIN — AMLODIPINE BESYLATE 5 MG: 5 TABLET ORAL at 10:12

## 2019-12-01 RX ADMIN — SENNOSIDES AND DOCUSATE SODIUM 1 TABLET: 8.6; 5 TABLET ORAL at 09:12

## 2019-12-01 RX ADMIN — GABAPENTIN 100 MG: 100 CAPSULE ORAL at 03:12

## 2019-12-01 RX ADMIN — FERROUS SULFATE TAB EC 325 MG (65 MG FE EQUIVALENT) 325 MG: 325 (65 FE) TABLET DELAYED RESPONSE at 09:12

## 2019-12-01 RX ADMIN — ATORVASTATIN CALCIUM 10 MG: 10 TABLET, FILM COATED ORAL at 10:12

## 2019-12-01 RX ADMIN — GABAPENTIN 100 MG: 100 CAPSULE ORAL at 09:12

## 2019-12-01 RX ADMIN — Medication 250 MG: at 09:12

## 2019-12-01 RX ADMIN — ACETAMINOPHEN 650 MG: 325 TABLET ORAL at 06:12

## 2019-12-01 NOTE — PLAN OF CARE
Plan  of care reviewed with patient. Reviewed scheduled appointments with patient and . Patient and  verbalized understanding. Will continue to monitor.

## 2019-12-01 NOTE — PLAN OF CARE
Problem: Occupational Therapy Goal  Goal: Occupational Therapy Goal  Description  Goals to be met by: 12/18/19     Patient will increase functional independence with ADLs by performing:    Feeding with Modified [place].  UE Dressing with Stand-by Assistance. GOAL MET 12/01/19  LE Dressing with Minimal Assistance. GOAL MET 12/01/19  Grooming while seated with Stand-by Assistance.GOAL MET 12/01/19  Toileting from toilet with Minimal Assistance for hygiene and clothing management. GOAL MET 12/01/19  Bathing from  sitting at sink with Moderate Assistance.  Supine to sit with Stand-by Assistance.  Step transfer with Stand-by Assistance with RW  Upper extremity exercise program x10 reps per set, with supervision.  Caregiver will be competent with (A) with self care assist and with functional transfers. GOAL MET 12/01/19      Outcome: Ongoing, Progressing    Pt was agreeable to OT and was noted to make progress towards her goals in therapy.  During today's session, pt met 5 goals: UB dressing, LB dressing, grooming, toileting, and caregiver competency with transfers. Pt's goals remain appropriate at this time.  She will continue to benefit from skilled OT services in order to assist her with increasing her safety and level of independence with self care and mobility tasks.     Sandy Lovett, OT  12/1/2019

## 2019-12-01 NOTE — PT/OT/SLP PROGRESS
"Occupational Therapy  Treatment    Lala Saxena   MRN: [id number]   Admitting Diagnosis: Cervical myelopathy    OT Date of Treatment: 12/01/19       Billable Minutes:  Self Care/Home Management 40 and Neuromuscular Re-education 15    General Precautions: Standard, fall  Orthopedic Precautions: spinal precautions  Braces: Aspen collar    Spiritual, Cultural Beliefs, Tenriism Practices, Values that Affect Care: no    Subjective:  Communicated with nurse prior to session.  "I thought I couldn't bed or twist at my waist.  I misunderstood."  Pt referring to spinal precautions - thought it was entire back, not just cervical spine (area of sx)    Pain/Comfort  Pain Rating 1: 0/10  Pain Rating Post-Intervention 1: 0/10    Objective:  Patient found with: cervical collar( present)    Occupational Performance:    Bed Mobility:    · N/A     Functional Mobility/Transfers:  · Patient completed Sit <> Stand Transfer with stand by assistance  with  rolling walker   · Patient completed Toilet Transfer Stand Pivot technique with stand by assistance with  rolling walker  · Functional Mobility: Pt able to perform sit to stand for all ADLs with SBA using W    Activities of Daily Living:  · Grooming: supervision set up A at w/c level  · Bathing: not observed  - per pt and spouse's report, pt bathed last night (sponge bath) and only needed A with feet  · Upper Body Dressing: supervision set up A - no buttons necessary as opening of shirt was wide enough to get around neck brace - performed sitting in w/c - needed increased time to doff but able to do without assist  · Lower Body Dressing: minimum assistance pt and spouse educated on use of reacher and sock aide to assist with socks - pt demonstrated hip flexion ample to doff socks but needed sockaide to hansa socks - pt able to hansa/doff pants without use of AD with increased time  · Toileting: not observed - pt and spouse reported that pt has been using the toilet on her own - " during LB dressing, pt demonstrated the dexterity and AROM in order to support her ability to perform toileting on her own.     [facility] 6 Click:  [facility] Total Score: 19    OT Exercises: Pt working on FM skills in order to improve ADL function due to pt's decreased FM dexterity, strength, and sensation.  Pt issued red theraputty and educated on exercises to perform to strengthen and improve FM pinches/grasps:  Pt reviewed, lateral key pinch, tip to tip pinch, thumb opposition, tripod grasp and gross grasp exercises.     Additional Treatment:  · Pt completed ADLs and [facility] mobility activities for tx session as noted above  · Pt reviewed spinal precautions with limitations noted for cervical spine only - pt had misunderstood and thought they were in place for all of back (lumbar and thoracic) - with review of correct spinal precautions, pt able to perform more of ADLs as she is able to cross her legs and bed at waist to perform LB dressing/ADLS  · Pt educated on role of OT and POC      Patient left up in chair with call button in reach and  present    ASSESSMENT:  Lala Saxena is a 59 y.o. female with a medical diagnosis of Cervical myelopathy and deficits noted below.  Pt was agreeable to OT and was noted to make progress towards her goals in therapy.  During today's session, pt met 5 goals: UB dressing, LB dressing, grooming, toileting, and caregiver competency with transfers. Pt's goals remain appropriate at this time.  She will continue to benefit from skilled OT services in order to assist her with increasing her safety and level of independence with self care and mobility tasks.       Rehab identified problem list/impairments: weakness, impaired endurance, impaired self care skills, impaired functional mobilty, gait instability, impaired balance, decreased upper extremity function, decreased lower extremity function, decreased safety awareness, pain, decreased ROM, impaired fine motor    Rehab potential is  good    Activity tolerance: Good    Discharge recommendations: home health OT     Barriers to discharge: Decreased caregiver support     Equipment recommendations: (TBD)     GOALS:   Multidisciplinary Problems     Occupational Therapy Goals        Problem: Occupational Therapy Goal    Goal Priority Disciplines Outcome Interventions   Occupational Therapy Goal     OT, PT/OT Ongoing, Progressing    Description:  Goals to be met by: 12/18/19     Patient will increase functional independence with ADLs by performing:    Feeding with Modified [place].  UE Dressing with Stand-by Assistance. GOAL MET 12/01/19  LE Dressing with Minimal Assistance. GOAL MET 12/01/19  Grooming while seated with Stand-by Assistance.GOAL MET 12/01/19  Toileting from toilet with Minimal Assistance for hygiene and clothing management. GOAL MET 12/01/19  Bathing from  sitting at sink with Moderate Assistance.  Supine to sit with Stand-by Assistance.  Step transfer with Stand-by Assistance with RW  Upper extremity exercise program x10 reps per set, with supervision.  Caregiver will be competent with (A) with self care assist and with functional transfers. GOAL MET 12/01/19                       Plan:  Patient to be seen 5 x/week, 6 x/week to address the above listed problems via self-care/home management, therapeutic activities, therapeutic exercises, neuromuscular re-education  Plan of Care expires: 12/27/19  Plan of Care reviewed with: patient, spouse    Sandy LOFTON Rachell, OT  12/01/2019

## 2019-12-02 ENCOUNTER — OFFICE VISIT (OUTPATIENT)
Dept: ORTHOPEDICS | Facility: CLINIC | Age: 59
DRG: 552 | End: 2019-12-02
Attending: INTERNAL MEDICINE
Payer: COMMERCIAL

## 2019-12-02 VITALS — WEIGHT: 184 LBS | HEIGHT: 64 IN | BODY MASS INDEX: 31.41 KG/M2

## 2019-12-02 DIAGNOSIS — Z98.1 S/P CERVICAL SPINAL FUSION: Primary | ICD-10-CM

## 2019-12-02 LAB
25(OH)D3+25(OH)D2 SERPL-MCNC: 9 NG/ML (ref 30–96)
ANION GAP SERPL CALC-SCNC: 7 MMOL/L (ref 8–16)
BASOPHILS # BLD AUTO: 0.05 K/UL (ref 0–0.2)
BASOPHILS NFR BLD: 0.4 % (ref 0–1.9)
BUN SERPL-MCNC: 15 MG/DL (ref 6–20)
CALCIUM SERPL-MCNC: 10 MG/DL (ref 8.7–10.5)
CHLORIDE SERPL-SCNC: 100 MMOL/L (ref 95–110)
CO2 SERPL-SCNC: 31 MMOL/L (ref 23–29)
CREAT SERPL-MCNC: 0.7 MG/DL (ref 0.5–1.4)
DIFFERENTIAL METHOD: ABNORMAL
EOSINOPHIL # BLD AUTO: 0.3 K/UL (ref 0–0.5)
EOSINOPHIL NFR BLD: 2.7 % (ref 0–8)
ERYTHROCYTE [DISTWIDTH] IN BLOOD BY AUTOMATED COUNT: 12.9 % (ref 11.5–14.5)
EST. GFR  (AFRICAN AMERICAN): >60 ML/MIN/1.73 M^2
EST. GFR  (NON AFRICAN AMERICAN): >60 ML/MIN/1.73 M^2
ESTIMATED AVG GLUCOSE: 148 MG/DL (ref 68–131)
FERRITIN SERPL-MCNC: 645 NG/ML (ref 20–300)
GLUCOSE SERPL-MCNC: 127 MG/DL (ref 70–110)
HBA1C MFR BLD HPLC: 6.8 % (ref 4–5.6)
HCT VFR BLD AUTO: 30.2 % (ref 37–48.5)
HGB BLD-MCNC: 9.3 G/DL (ref 12–16)
IMM GRANULOCYTES # BLD AUTO: 0.12 K/UL (ref 0–0.04)
IMM GRANULOCYTES NFR BLD AUTO: 1 % (ref 0–0.5)
IRON SERPL-MCNC: 81 UG/DL (ref 30–160)
LYMPHOCYTES # BLD AUTO: 2.2 K/UL (ref 1–4.8)
LYMPHOCYTES NFR BLD: 17.2 % (ref 18–48)
MAGNESIUM SERPL-MCNC: 1.7 MG/DL (ref 1.6–2.6)
MCH RBC QN AUTO: 29.1 PG (ref 27–31)
MCHC RBC AUTO-ENTMCNC: 30.8 G/DL (ref 32–36)
MCV RBC AUTO: 94 FL (ref 82–98)
MONOCYTES # BLD AUTO: 0.7 K/UL (ref 0.3–1)
MONOCYTES NFR BLD: 5.5 % (ref 4–15)
NEUTROPHILS # BLD AUTO: 9.3 K/UL (ref 1.8–7.7)
NEUTROPHILS NFR BLD: 73.2 % (ref 38–73)
NRBC BLD-RTO: 0 /100 WBC
PHOSPHATE SERPL-MCNC: 3.8 MG/DL (ref 2.7–4.5)
PLATELET # BLD AUTO: 601 K/UL (ref 150–350)
PMV BLD AUTO: 10 FL (ref 9.2–12.9)
POCT GLUCOSE: 135 MG/DL (ref 70–110)
POCT GLUCOSE: 142 MG/DL (ref 70–110)
POCT GLUCOSE: 151 MG/DL (ref 70–110)
POCT GLUCOSE: 72 MG/DL (ref 70–110)
POTASSIUM SERPL-SCNC: 4.4 MMOL/L (ref 3.5–5.1)
RBC # BLD AUTO: 3.2 M/UL (ref 4–5.4)
SATURATED IRON: 28 % (ref 20–50)
SODIUM SERPL-SCNC: 138 MMOL/L (ref 136–145)
TOTAL IRON BINDING CAPACITY: 293 UG/DL (ref 250–450)
TRANSFERRIN SERPL-MCNC: 198 MG/DL (ref 200–375)
WBC # BLD AUTO: 12.62 K/UL (ref 3.9–12.7)

## 2019-12-02 PROCEDURE — 85025 COMPLETE CBC W/AUTO DIFF WBC: CPT

## 2019-12-02 PROCEDURE — 63600175 PHARM REV CODE 636 W HCPCS: Performed by: INTERNAL MEDICINE

## 2019-12-02 PROCEDURE — 83036 HEMOGLOBIN GLYCOSYLATED A1C: CPT

## 2019-12-02 PROCEDURE — 99024 POSTOP FOLLOW-UP VISIT: CPT | Mod: S$GLB,,, | Performed by: PHYSICIAN ASSISTANT

## 2019-12-02 PROCEDURE — 25000003 PHARM REV CODE 250: Performed by: INTERNAL MEDICINE

## 2019-12-02 PROCEDURE — 97530 THERAPEUTIC ACTIVITIES: CPT

## 2019-12-02 PROCEDURE — 83540 ASSAY OF IRON: CPT

## 2019-12-02 PROCEDURE — 99024 PR POST-OP FOLLOW-UP VISIT: ICD-10-PCS | Mod: S$GLB,,, | Performed by: PHYSICIAN ASSISTANT

## 2019-12-02 PROCEDURE — 99999 PR PBB SHADOW E&M-EST. PATIENT-LVL IV: CPT | Mod: PBBFAC,,, | Performed by: PHYSICIAN ASSISTANT

## 2019-12-02 PROCEDURE — 97116 GAIT TRAINING THERAPY: CPT

## 2019-12-02 PROCEDURE — 97110 THERAPEUTIC EXERCISES: CPT

## 2019-12-02 PROCEDURE — 80048 BASIC METABOLIC PNL TOTAL CA: CPT

## 2019-12-02 PROCEDURE — 25000003 PHARM REV CODE 250: Performed by: NURSE PRACTITIONER

## 2019-12-02 PROCEDURE — 36415 COLL VENOUS BLD VENIPUNCTURE: CPT

## 2019-12-02 PROCEDURE — 83735 ASSAY OF MAGNESIUM: CPT

## 2019-12-02 PROCEDURE — 11000004 HC SNF PRIVATE

## 2019-12-02 PROCEDURE — 82306 VITAMIN D 25 HYDROXY: CPT

## 2019-12-02 PROCEDURE — 84100 ASSAY OF PHOSPHORUS: CPT

## 2019-12-02 PROCEDURE — 25000003 PHARM REV CODE 250: Performed by: STUDENT IN AN ORGANIZED HEALTH CARE EDUCATION/TRAINING PROGRAM

## 2019-12-02 PROCEDURE — 99999 PR PBB SHADOW E&M-EST. PATIENT-LVL IV: ICD-10-PCS | Mod: PBBFAC,,, | Performed by: PHYSICIAN ASSISTANT

## 2019-12-02 PROCEDURE — 82728 ASSAY OF FERRITIN: CPT

## 2019-12-02 RX ORDER — DOCUSATE SODIUM 283 MG/5ML
1 LIQUID RECTAL ONCE
Status: DISCONTINUED | OUTPATIENT
Start: 2019-12-02 | End: 2019-12-03 | Stop reason: HOSPADM

## 2019-12-02 RX ORDER — POLYETHYLENE GLYCOL 3350 17 G/17G
17 POWDER, FOR SOLUTION ORAL DAILY
Status: DISCONTINUED | OUTPATIENT
Start: 2019-12-02 | End: 2019-12-03 | Stop reason: HOSPADM

## 2019-12-02 RX ORDER — LANOLIN ALCOHOL/MO/W.PET/CERES
400 CREAM (GRAM) TOPICAL 2 TIMES DAILY
Status: DISCONTINUED | OUTPATIENT
Start: 2019-12-02 | End: 2019-12-03 | Stop reason: HOSPADM

## 2019-12-02 RX ORDER — INSULIN ASPART 100 [IU]/ML
0-5 INJECTION, SOLUTION INTRAVENOUS; SUBCUTANEOUS
Status: DISCONTINUED | OUTPATIENT
Start: 2019-12-02 | End: 2019-12-03 | Stop reason: HOSPADM

## 2019-12-02 RX ORDER — LOSARTAN POTASSIUM 50 MG/1
50 TABLET ORAL DAILY
Status: DISCONTINUED | OUTPATIENT
Start: 2019-12-03 | End: 2019-12-03 | Stop reason: HOSPADM

## 2019-12-02 RX ORDER — AMOXICILLIN 250 MG
2 CAPSULE ORAL 2 TIMES DAILY
Status: DISCONTINUED | OUTPATIENT
Start: 2019-12-02 | End: 2019-12-03 | Stop reason: HOSPADM

## 2019-12-02 RX ADMIN — LEVOTHYROXINE SODIUM 50 MCG: 50 TABLET ORAL at 05:12

## 2019-12-02 RX ADMIN — METHOCARBAMOL TABLETS 1000 MG: 500 TABLET, COATED ORAL at 10:12

## 2019-12-02 RX ADMIN — METHOCARBAMOL TABLETS 1000 MG: 500 TABLET, COATED ORAL at 09:12

## 2019-12-02 RX ADMIN — METFORMIN HYDROCHLORIDE 1000 MG: 500 TABLET, FILM COATED ORAL at 04:12

## 2019-12-02 RX ADMIN — HEPARIN SODIUM 5000 UNITS: 5000 INJECTION, SOLUTION INTRAVENOUS; SUBCUTANEOUS at 05:12

## 2019-12-02 RX ADMIN — SENNOSIDES AND DOCUSATE SODIUM 2 TABLET: 8.6; 5 TABLET ORAL at 10:12

## 2019-12-02 RX ADMIN — HEPARIN SODIUM 5000 UNITS: 5000 INJECTION, SOLUTION INTRAVENOUS; SUBCUTANEOUS at 10:12

## 2019-12-02 RX ADMIN — ATORVASTATIN CALCIUM 10 MG: 10 TABLET, FILM COATED ORAL at 09:12

## 2019-12-02 RX ADMIN — GLIMEPIRIDE 4 MG: 1 TABLET ORAL at 09:12

## 2019-12-02 RX ADMIN — Medication 400 MG: at 10:12

## 2019-12-02 RX ADMIN — GABAPENTIN 100 MG: 100 CAPSULE ORAL at 10:12

## 2019-12-02 RX ADMIN — Medication 250 MG: at 10:12

## 2019-12-02 RX ADMIN — METHOCARBAMOL TABLETS 1000 MG: 500 TABLET, COATED ORAL at 02:12

## 2019-12-02 RX ADMIN — FERROUS SULFATE TAB EC 325 MG (65 MG FE EQUIVALENT) 325 MG: 325 (65 FE) TABLET DELAYED RESPONSE at 10:12

## 2019-12-02 RX ADMIN — ACETAMINOPHEN 650 MG: 325 TABLET ORAL at 02:12

## 2019-12-02 RX ADMIN — GABAPENTIN 100 MG: 100 CAPSULE ORAL at 09:12

## 2019-12-02 RX ADMIN — SENNOSIDES AND DOCUSATE SODIUM 1 TABLET: 8.6; 5 TABLET ORAL at 09:12

## 2019-12-02 RX ADMIN — HEPARIN SODIUM 5000 UNITS: 5000 INJECTION, SOLUTION INTRAVENOUS; SUBCUTANEOUS at 02:12

## 2019-12-02 RX ADMIN — POLYETHYLENE GLYCOL 3350 17 G: 17 POWDER, FOR SOLUTION ORAL at 02:12

## 2019-12-02 RX ADMIN — ACETAMINOPHEN 650 MG: 325 TABLET ORAL at 05:12

## 2019-12-02 RX ADMIN — METFORMIN HYDROCHLORIDE 1000 MG: 500 TABLET, FILM COATED ORAL at 09:12

## 2019-12-02 RX ADMIN — GABAPENTIN 100 MG: 100 CAPSULE ORAL at 02:12

## 2019-12-02 RX ADMIN — AMLODIPINE BESYLATE 5 MG: 5 TABLET ORAL at 09:12

## 2019-12-02 RX ADMIN — ACETAMINOPHEN 650 MG: 325 TABLET ORAL at 10:12

## 2019-12-02 RX ADMIN — CELECOXIB 200 MG: 100 CAPSULE ORAL at 09:12

## 2019-12-02 RX ADMIN — LOSARTAN POTASSIUM 25 MG: 25 TABLET, FILM COATED ORAL at 09:12

## 2019-12-02 NOTE — PROGRESS NOTES
Patient left off of unit  via wheelchair with transportation service to Spine center for appt.  Wound vac remains intact with suction. Hard collar on. Patient denies pain No acute distress noted.

## 2019-12-02 NOTE — PROGRESS NOTES
Date: 12/02/2019    Supervising Physician: Miguel Cruz M.D.    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 Overall the patient is doing well but today notes she does not have any pain.   She reports her gait is improved, as well as her hand coordination.  She is at SNF.  She is doing well.  She is in a collar today.   she denies fever, chills, and sweats since the time of the surgery.       Exam: Wound vac taken down.  Incision is healing well, clean, dry and intact.   There is no sign of infection. Neuro exam is stable. No signs of DVT.    Radiographs: no new imaging today    Assessment/Plan: 2 weeks post op.    Doing well postoperatively. Wound vac removed today.  Continue c collar for at least 12 weeks.     I will plan to see the patient back for the next postop visit in 4 weeks with imaging.     Thank you for the opportunity to participate in this patient's care. Please give me a call if there are any concerns or questions.

## 2019-12-02 NOTE — PROGRESS NOTES
[facility]                                  Skilled Nursing Facility                   Progress Note     Admit Date: 2019  CARLOS TBD   Principal Problem:  Cervical myelopathy   HPI obtained from patient interview and chart review     Chief Complaint: Revaluation of medical treatment and therapy status: Lab review; hypertension; patient reports constipation      HPI:   Mrs Saxena is a  59-year-old female with PMHx of HTN, HLD, T2DM  who presents to SNF following a hospitalization for cord compression s/p elective posterior cervical spinal fusion and laminectomy for spinal stenosis decompression of C3-6.  Admission to SNF for secondary weakness and debility.     Interval History All of today's labs reviewed and are listed below.  Mag low at 1.7, no leukocytosis.  H&H stable at 9.3/20, platelets elevated to 601.  24 hr vital sign ranges listed below.  24 hr blood pressure range is 160/70 to 160/78.  24 hr blood glucose range is .  Patient endorses abdominal discomfort and constipation.  Patient's last bowel movement was 2 days ago.  Patient has active bowel sounds throughout that are hypoactive.  She reports passing flatus.  Patient denies trouble sleeping at night, shortness of breath or cough,  nausea, or vomiting.  Patient reports an adequate appetite.  Patient denies dysuria.   Patient progessing with PT/OT. Continuing to follow and treat all acute and chronic conditions.      Past Medical History: Patient has a past medical history of Diabetes mellitus, type 2, Hyperlipidemia, Hypertension, and Thyroid disease.    Past Surgical History: Patient has a past surgical history that includes  section; Abdominal surgery; and Fracture surgery.    Social History: Patient reports that she has never smoked. She has never used smokeless tobacco. She reports that she drank alcohol. She reports that she does not use  drugs.    Family History:  No significant family history to report    Allergies: Patient has No Known Allergies.    ROS  Constitutional: Negative for fever or fatigue.   Eyes: Negative for blurred vision, double vision and discharge.   Respiratory: Negative for cough, shortness of breath and wheezing.    Cardiovascular: Negative for chest pain, palpitations.+ leg swelling.   Gastrointestinal: + for abdominal pain, constipation.  Negative for diarrhea, nausea and vomiting.   Genitourinary: Negative for dysuria, frequency and urgency.   Musculoskeletal:  + generalized weakness, neck pain.   Skin: Negative for itching and rash.   Neurological: Negative for dizziness, speech change, and headaches.   Psychiatric/Behavioral: Negative for depression. The patient is not nervous/anxious.      PEx  Temp:  [97.1 °F (36.2 °C)-97.3 °F (36.3 °C)]   Pulse:  [84-89]   Resp:  [18]   BP: (160)/(70-78)   SpO2:  [100 %]      Constitutional: Patient appears well-developed and in no distress   HENT:   Head: Normocephalic and atraumatic.   Eyes: Pupils are equal, round  Neck:  C-collar in place  Cardiovascular: Normal rate, regular rhythm and normal heart sounds.    Pulmonary/Chest: Effort normal and breath sounds are clear  Abdominal: Soft. Bowel sounds are hypoactive.   Musculoskeletal: Normal range of motion.   Neurological: Alert and oriented to person, place, and time.   Psychiatric: Normal mood and affect. Behavior is normal.   Skin: Skin is warm and dry. Surgical incision to back of neck; unable to visualize due to dressing placed by NSx at post op apt today.     Recent Labs   Lab 12/02/19  0550      K 4.4      CO2 31*   BUN 15   CREATININE 0.7   MG 1.7       Recent Labs   Lab 12/02/19  0550   WBC 12.62   RBC 3.20*   HGB 9.3*   HCT 30.2*   *   MCV 94   MCH 29.1   MCHC 30.8*         Assessment and Plan:       Problems addressed today       Essential hypertension  - Continue amlodipine 5 mg daily  - 12/2 increase  losartan to 50 mg daily    Constipation  - initiated MiraLax daily, increase senna docusate 2 tabs BID; order docusate enema if MiraLax unsuccessful today    Hypomagnesemia  - initiated magnesium oxide 400 mg BID x2 days     Cervical myelopathy  S/p C3-6 laminectomy and fusion  Cervical DJD  - PT/OT: WBAT in cervical collar fro 12 weeks   - 12/2 postop appointment completed today- Prevena wound VAC removed- foam dressing in place    Acute postop pain  - Pain control: acetaminophen 650 mg q8h, celecoxib 200 mg daily, gabapentin 100 mg TID, methocarbamol 1000 mg TID, oxycodone 5 mg q5h prn, Methylsalicylate menthol to shoulders and gluteus medius  - 12/2 pain well controlled    DM II with HTN and CKD II  - continue home glimepiride 4 mg daily and metformin 1 g BID  - continue SSI and accuchecks while here     Expected blood loss anemia  Unspecified chronic anemia  Leukocytosis  - continue Ferrous sulfate 325 mg and ascorbic acid nightly  - Iron levels, vitamin D level  - Leukocytosis present on routine labs - follow up with PCP as outpatient for further evaluation and treatment as needed  - 12/2 leukocytosis resolved.  H&H stable       Ongoing but stable problems      Debility  - Continue with PT/OT for gait training and strengthening and restoration of ADL's   - Encourage mobility, OOB in chair, and early ambulation as appropriate  - Fall precautions   - Monitor for bowel and bladder dysfunction  - Monitor for and prevent skin breakdown and pressure ulcers  - Continue DVT prophylaxis with heparin 5000 units subq TID     Hypothyroidism   - continue levothyroxine 50 mcg daily     DM II with Hyperlipidemia  - Atorvastatin 10 mg daily       Future Appointments   Date Time Provider Department Center   12/5/2019 11:00 AM Edward Hernandez MD [facility] IM [address] PCW   12/23/2019  7:20 AM Janette Church MD [facility] FAM MED [place]   1/3/2020  8:30 AM [facility]   1/3/2020  9:30 AM Quyen Ramírez PA-C  [facility] SPINE [address] NP      DOS: 12/2/2019       Patient note was created using MModal Dictation.  Any errors in syntax or even information may not have been identified and edited on initial review prior to signing this note.

## 2019-12-02 NOTE — PT/OT/SLP PROGRESS
Physical Therapy  Treatment    Lala Saxena   MRN: [id number]   Admitting Diagnosis: Cervical myelopathy    PT Received On: 12/02/19  Total Time (min): (--)       Billable Minutes:  Gait Training 10, Therapeutic Activity 10 and Therapeutic Exercise 18    Treatment Type: Treatment  PT/PTA: PTA     PTA Visit Number: 4       General Precautions: Standard, fall  Orthopedic Precautions: spinal precautions   Braces: Aspen collar    Spiritual, Cultural Beliefs, Judaism Practices, Values that Affect Care: no    Subjective:  Communicated with nursing prior to session.  Pt agreed to work with therapy.     Pain/Comfort  Pain Rating 1: 0/10  Pain Rating Post-Intervention 1: 0/10    Objective:  Patient found seated w/c w/ aspen collar donned.        AM-PAC 6 CLICK MOBILITY  Total Score:17    Bed Mobility:  Sit>Supine: not performed  Supine>Sit: not performed    Transfers:  Sit<>Stand: SPV w/ RW   Stand Pivot Transfer: SPV w/ RW     Gait:  Amb x2 trials 138ft and 120 ft w/ RW and SPV. No LOB noted.      Advanced Gait:  Curb Step: ascend/descend 4 inch curb step w/ RW and SBA     Therex:  BLE therex x20 reps:    -AP   -LAQ   -Hip Flexion    -GS      Additional Treatment:  -recumbent stepper x15 min L1-2.    Patient left up in chair with call button in reach and nursing notified.    Assessment:  Lala Saxena is a 59 y.o. female with a medical diagnosis of Cervical myelopathy.  Pt tolerated treatment well, and will continue to benefit from PT services at this time. Continue with PT POC as indicated.    Rehab identified problem list/impairments: weakness, impaired endurance, impaired sensation, impaired functional mobilty, gait instability, impaired balance, decreased lower extremity function, decreased upper extremity function, orthopedic precautions    Rehab potential is good.    Activity tolerance: Good    Discharge recommendations: home health PT     Barriers to discharge: Decreased caregiver support    Equipment  recommendations: none     GOALS:   Multidisciplinary Problems     Physical Therapy Goals        Problem: Physical Therapy Goal    Goal Priority Disciplines Outcome Goal Variances Interventions   Physical Therapy Goal     PT, PT/OT Ongoing, Progressing     Description:  Goals to be met by: 2019    Patient will increase functional independence with mobility by performin. Supine to sit with Modified [place]  2. Sit to supine with Modified [place]  3. Rolling to Left and Right with Modified [place].  4. Sit to stand transfer with Modified [place]  5. Bed to chair transfer with Supervision using Rolling Walker  6. Gait  x 120 feet with Supervision using Rolling Walker. Met 2019  7. Ascend/descend 4 steps with bilateral Handrails Stand-by Assistance met 2019  8. Ascend/Descend 4 inch curb step with Supervision using Rolling Walker.  9. Stand for 5 minutes with Supervision using Rolling Walker while performing dynamic activity met 2019  10.Perform car transfer (actual or simulated) w/ RW and SBA met 2019                        PLAN:    Patient to be seen 5 x/week  to address the above listed problems via gait training, therapeutic activities, therapeutic exercises, neuromuscular re-education  Plan of Care expires: 19  Plan of Care reviewed with: patient    Leslee Diallo, PTA  2019

## 2019-12-02 NOTE — PLAN OF CARE
Problem: Adult Inpatient Plan of Care  Goal: Plan of Care Review  Outcome: Ongoing, Progressing  Goal: Patient-Specific Goal (Individualization)  Outcome: Ongoing, Progressing  Goal: Absence of Hospital-Acquired Illness or Injury  Outcome: Ongoing, Progressing  Goal: Optimal Comfort and Wellbeing  Outcome: Ongoing, Progressing  Goal: Readiness for Transition of Care  Outcome: Ongoing, Progressing  Goal: Rounds/Family Conference  Outcome: Ongoing, Progressing     Problem: Diabetes Comorbidity  Goal: Blood Glucose Level Within Desired Range  Outcome: Ongoing, Progressing     Problem: Fall Injury Risk  Goal: Absence of Fall and Fall-Related Injury  Outcome: Ongoing, Progressing     Problem: Skin Injury Risk Increased  Goal: Skin Health and Integrity  Outcome: Ongoing, Progressing

## 2019-12-02 NOTE — NURSING
[facility].NO COMPLAINT OF DISCOMFORT VERBALIZED.

## 2019-12-02 NOTE — LETTER
December 2, 2019      Yenifer Munoz MD  [address]  [place] [address]           [facility]  [address]  [facility] [address]  Phone: [phone]          Patient: Lala Saxena   MR Number: [id number]   YOB: 1960   Date of Visit: 12/2/2019       Dear Dr. Yenifer Munoz:    Thank you for referring Lala Saxena to me for evaluation. Attached you will find relevant portions of my assessment and plan of care.    If you have questions, please do not hesitate to call me. I look forward to following Lala Saxena along with you.    Sincerely,    uQyen Ramírez PA-C    Enclosure  CC:  No Recipients    If you would like to receive this communication electronically, please contact [email or web address] or [phone] to request more information on [employer] Link access.    For providers and/or their staff who would like to refer a patient to [facility], please contact us through our one-stop-shop provider referral line, [facility], at [phone].    If you feel you have received this communication in error or would no longer like to receive these types of communications, please e-mail [email or web address]

## 2019-12-02 NOTE — PT/OT/SLP PROGRESS
Occupational Therapy  Treatment    Lala Saxena   MRN: [id number]   Admitting Diagnosis: Cervical myelopathy    OT Date of Treatment: 12/02/19       Billable Minutes:  Therapeutic Activity 42    General Precautions: Standard, fall  Orthopedic Precautions: spinal precautions  Braces: Aspen collar    Spiritual, Cultural Beliefs, Sikh Practices, Values that Affect Care: no    Subjective:  Communicated with patient prior to session.    Pain/Comfort  Pain Rating 1: 0/10  Pain Rating Post-Intervention 1: 0/10    Objective:  Patient found with: cervical collar seated in bed side chair.     Occupational Performance:    Functional Mobility/Transfers:  · Patient completed Sit <> Stand Transfer with modified independence  with  rolling walker   · Functional Mobility: Patient ambulated throughout room and room <> rehab gym a total of ~200 feet with RW with S.     [facility] 6 Click:  [facility] Total Score: 19    Additional Treatment:  Patient completed pattern using large pegs to increase in hand manipulation and fine motor skills.   Patient also completed fine motor activity of various sizes of nuts/bolts while standing at counter level ~24 minutes to increase fine motor skills and dexterity needed for self care.     Patient left up in chair with call button in reach    ASSESSMENT:  Lala Saxena is a 59 y.o. female with a medical diagnosis of Cervical myelopathy and presents with the deficits listed below. Patient tolerated session well. Continue OT POC.     Rehab identified problem list/impairments: weakness, impaired endurance, impaired self care skills, impaired functional mobilty, gait instability, impaired balance, decreased upper extremity function, decreased lower extremity function, decreased safety awareness, pain, decreased ROM, impaired fine motor    Rehab potential is good    Activity tolerance: Good    Discharge recommendations: home health OT     Barriers to discharge: Decreased caregiver support     Equipment  recommendations: (TBD)     GOALS:   Multidisciplinary Problems     Occupational Therapy Goals        Problem: Occupational Therapy Goal    Goal Priority Disciplines Outcome Interventions   Occupational Therapy Goal     OT, PT/OT Ongoing, Progressing    Description:  Goals to be met by: 12/18/19     Patient will increase functional independence with ADLs by performing:    Feeding with Modified [place].  UE Dressing with Stand-by Assistance. GOAL MET 12/01/19  LE Dressing with Minimal Assistance. GOAL MET 12/01/19  Grooming while seated with Stand-by Assistance.GOAL MET 12/01/19  Toileting from toilet with Minimal Assistance for hygiene and clothing management. GOAL MET 12/01/19  Bathing from  sitting at sink with Moderate Assistance.  Supine to sit with Stand-by Assistance.  Step transfer with Stand-by Assistance with RW  Upper extremity exercise program x10 reps per set, with supervision.  Caregiver will be competent with (A) with self care assist and with functional transfers. GOAL MET 12/01/19                       Plan:  Patient to be seen 5 x/week, 6 x/week to address the above listed problems via self-care/home management, therapeutic activities, therapeutic exercises, neuromuscular re-education  Plan of Care expires: 12/27/19  Plan of Care reviewed with: patient    Debbie JULY Maddox  12/02/2019

## 2019-12-02 NOTE — NURSING
RETURNED TO FLOOR FROM CLINIC ACCOMPANIED BY TRANSPORTER IN WHEELCHAIR.FOAM DRESSING OVER NECK INCISION DRY AND INTACT AND INCISIONAL WOUND VAC WAS REMOVED IN CLINIC.WEARING CERVICAL COLLAR.

## 2019-12-03 VITALS
WEIGHT: 184.31 LBS | OXYGEN SATURATION: 100 % | HEIGHT: 64 IN | DIASTOLIC BLOOD PRESSURE: 68 MMHG | HEART RATE: 82 BPM | RESPIRATION RATE: 18 BRPM | SYSTOLIC BLOOD PRESSURE: 154 MMHG | TEMPERATURE: 98 F | BODY MASS INDEX: 31.47 KG/M2

## 2019-12-03 LAB
POCT GLUCOSE: 123 MG/DL (ref 70–110)
POCT GLUCOSE: 92 MG/DL (ref 70–110)

## 2019-12-03 PROCEDURE — 25000003 PHARM REV CODE 250: Performed by: STUDENT IN AN ORGANIZED HEALTH CARE EDUCATION/TRAINING PROGRAM

## 2019-12-03 PROCEDURE — 25000003 PHARM REV CODE 250: Performed by: INTERNAL MEDICINE

## 2019-12-03 PROCEDURE — 25000003 PHARM REV CODE 250: Performed by: NURSE PRACTITIONER

## 2019-12-03 PROCEDURE — 63600175 PHARM REV CODE 636 W HCPCS: Performed by: INTERNAL MEDICINE

## 2019-12-03 RX ORDER — OXYCODONE HYDROCHLORIDE 5 MG/1
5 TABLET ORAL EVERY 6 HOURS PRN
Qty: 20 TABLET | Refills: 0 | Status: SHIPPED | OUTPATIENT
Start: 2019-12-03 | End: 2021-12-07

## 2019-12-03 RX ORDER — OXYCODONE HYDROCHLORIDE 5 MG/1
5 TABLET ORAL EVERY 6 HOURS PRN
Qty: 20 TABLET | Refills: 0 | Status: SHIPPED | OUTPATIENT
Start: 2019-12-03 | End: 2019-12-03 | Stop reason: SDUPTHER

## 2019-12-03 RX ORDER — GABAPENTIN 100 MG/1
100 CAPSULE ORAL 3 TIMES DAILY
Qty: 30 CAPSULE | Refills: 0 | Status: SHIPPED | OUTPATIENT
Start: 2019-12-03 | End: 2019-12-03 | Stop reason: SDUPTHER

## 2019-12-03 RX ORDER — LOSARTAN POTASSIUM 50 MG/1
50 TABLET ORAL DAILY
Qty: 30 TABLET | Refills: 2 | Status: SHIPPED | OUTPATIENT
Start: 2019-12-04 | End: 2020-03-20 | Stop reason: SDUPTHER

## 2019-12-03 RX ORDER — AMOXICILLIN 250 MG
2 CAPSULE ORAL 2 TIMES DAILY
Qty: 180 TABLET | Refills: 2 | Status: SHIPPED | OUTPATIENT
Start: 2019-12-03

## 2019-12-03 RX ORDER — CELECOXIB 200 MG/1
200 CAPSULE ORAL DAILY
Qty: 30 CAPSULE | Refills: 0 | Status: SHIPPED | OUTPATIENT
Start: 2019-12-03 | End: 2019-12-03 | Stop reason: SDUPTHER

## 2019-12-03 RX ORDER — CELECOXIB 200 MG/1
200 CAPSULE ORAL DAILY
Qty: 30 CAPSULE | Refills: 0 | Status: SHIPPED | OUTPATIENT
Start: 2019-12-03 | End: 2020-01-02

## 2019-12-03 RX ORDER — POLYETHYLENE GLYCOL 3350 17 G/17G
17 POWDER, FOR SOLUTION ORAL DAILY
Qty: 30 PACKET | Refills: 2 | Status: SHIPPED | OUTPATIENT
Start: 2019-12-04 | End: 2021-12-07

## 2019-12-03 RX ORDER — METHOCARBAMOL 500 MG/1
500 TABLET, FILM COATED ORAL 3 TIMES DAILY PRN
Qty: 60 TABLET | Refills: 0 | Status: SHIPPED | OUTPATIENT
Start: 2019-12-03 | End: 2019-12-03 | Stop reason: SDUPTHER

## 2019-12-03 RX ORDER — METHOCARBAMOL 500 MG/1
500 TABLET, FILM COATED ORAL 3 TIMES DAILY PRN
Qty: 60 TABLET | Refills: 0 | Status: SHIPPED | OUTPATIENT
Start: 2019-12-03 | End: 2019-12-18

## 2019-12-03 RX ORDER — GABAPENTIN 100 MG/1
100 CAPSULE ORAL 3 TIMES DAILY
Qty: 30 CAPSULE | Refills: 0 | Status: SHIPPED | OUTPATIENT
Start: 2019-12-03 | End: 2020-01-14 | Stop reason: SDUPTHER

## 2019-12-03 RX ADMIN — METHOCARBAMOL TABLETS 1000 MG: 500 TABLET, COATED ORAL at 08:12

## 2019-12-03 RX ADMIN — METFORMIN HYDROCHLORIDE 1000 MG: 500 TABLET, FILM COATED ORAL at 08:12

## 2019-12-03 RX ADMIN — ACETAMINOPHEN 650 MG: 325 TABLET ORAL at 05:12

## 2019-12-03 RX ADMIN — ATORVASTATIN CALCIUM 10 MG: 10 TABLET, FILM COATED ORAL at 08:12

## 2019-12-03 RX ADMIN — LOSARTAN POTASSIUM 50 MG: 50 TABLET ORAL at 08:12

## 2019-12-03 RX ADMIN — Medication 400 MG: at 08:12

## 2019-12-03 RX ADMIN — GABAPENTIN 100 MG: 100 CAPSULE ORAL at 08:12

## 2019-12-03 RX ADMIN — AMLODIPINE BESYLATE 5 MG: 5 TABLET ORAL at 08:12

## 2019-12-03 RX ADMIN — GLIMEPIRIDE 4 MG: 1 TABLET ORAL at 08:12

## 2019-12-03 RX ADMIN — HEPARIN SODIUM 5000 UNITS: 5000 INJECTION, SOLUTION INTRAVENOUS; SUBCUTANEOUS at 05:12

## 2019-12-03 RX ADMIN — LEVOTHYROXINE SODIUM 50 MCG: 50 TABLET ORAL at 05:12

## 2019-12-03 RX ADMIN — CELECOXIB 200 MG: 100 CAPSULE ORAL at 08:12

## 2019-12-03 NOTE — NURSING
DISCHARGE INSTRUCTIONS GIVEN AND PATIENT UNDERSTANDS.DISCHARGED BY WHEELCHAIR ACCOMPANIED BY PCT TO LOBBY AND  TO HOME WITH PERSONAL BELONGINGS.

## 2019-12-03 NOTE — CLINICAL REVIEW
Clinical Pharmacy Chart Review Note      Admit Date: 11/26/2019   LOS: 7 days       Lala Saxena is a 59 y.o. female admitted to SNF for PT/OT after hospitalization for cervical myelopathy.    Active Hospital Problems    Diagnosis  POA    *Cervical myelopathy [G95.9]  Yes    Debility [R53.81]  Yes    Acute blood loss anemia [D62]  Yes    Hypothyroidism due to acquired atrophy of thyroid [E03.4]  Yes    Fusion of spine of cervical region [M43.22]  Yes    Hyperlipidemia [E78.5]  Yes    Essential hypertension [I10]  Yes    Type 2 diabetes mellitus, without long-term current use of insulin [E11.9]  Yes      Resolved Hospital Problems   No resolved problems to display.     Review of patient's allergies indicates:  No Known Allergies  Patient Active Problem List    Diagnosis Date Noted    Debility 11/27/2019    Acute blood loss anemia 11/27/2019    Hypothyroidism due to acquired atrophy of thyroid 11/27/2019    Fusion of spine of cervical region 11/26/2019    Hyperlipidemia 11/19/2019    Cervical myelopathy 11/12/2019    Type 2 diabetes mellitus, without long-term current use of insulin 11/07/2019    Essential hypertension 11/07/2019    Ataxia 11/05/2019    Left hand weakness 11/05/2019       Scheduled Meds:    acetaminophen  650 mg Oral Q8H    amLODIPine  5 mg Oral Daily    ascorbic acid (vitamin C)  250 mg Oral QHS    atorvastatin  10 mg Oral Daily    celecoxib  200 mg Oral Daily    docusate sodium  1 enema Rectal Once    ferrous sulfate  325 mg Oral QHS    gabapentin  100 mg Oral TID    glimepiride  4 mg Oral Daily with breakfast    heparin (porcine)  5,000 Units Subcutaneous Q8H    levothyroxine  50 mcg Oral Before breakfast    losartan  50 mg Oral Daily    magnesium oxide  400 mg Oral BID    metFORMIN  1,000 mg Oral BID WM    methocarbamol  1,000 mg Oral TID    methyl salicylate-menthol 15-10%   Topical (Top) TID    polyethylene glycol  17 g Oral Daily    senna-docusate 8.6-50  mg  2 tablet Oral BID     Continuous Infusions:   PRN Meds: acetaminophen, calcium carbonate, dextrose 50%, dextrose 50%, glucagon (human recombinant), glucose, glucose, insulin aspart U-100, lactulose, melatonin, oxyCODONE    OBJECTIVE:     Vital Signs (Last 24H)  Temp:  [97.9 °F (36.6 °C)-98.2 °F (36.8 °C)]   Pulse:  [82-84]   Resp:  [18]   BP: (143-154)/(65-68)   SpO2:  [100 %]     Laboratory:  CBC:   Recent Labs   Lab 12/02/19  0550   WBC 12.62   RBC 3.20*   HGB 9.3*   HCT 30.2*   *   MCV 94   MCH 29.1   MCHC 30.8*     BMP:   Recent Labs   Lab 12/02/19  0550   *      K 4.4      CO2 31*   BUN 15   CREATININE 0.7   CALCIUM 10.0   MG 1.7     CMP:   Recent Labs   Lab 12/02/19  0550   *   CALCIUM 10.0      K 4.4   CO2 31*      BUN 15   CREATININE 0.7     LFTs: No results for input(s): ALT, AST, ALKPHOS, BILITOT, PROT, ALBUMIN in the last 168 hours.  Coagulation: No results for input(s): PT, INR, APTT in the last 168 hours.  Cardiac markers: No results for input(s): CKMB, TROPONINT, MYOGLOBIN in the last 168 hours.  ABGs: No results for input(s): PH, PCO2, PO2, HCO3, POCSATURATED, BE in the last 168 hours.  Microbiology Results (last 7 days)     ** No results found for the last 168 hours. **        Specimen (12h ago, onward)    None        No results for input(s): COLORU, CLARITYU, SPECGRAV, PHUR, PROTEINUA, GLUCOSEU, BILIRUBINCON, BLOODU, WBCU, RBCU, BACTERIA, MUCUS, NITRITE, LEUKOCYTESUR, UROBILINOGEN, HYALINECASTS in the last 168 hours.  Others:   Recent Labs   Lab 12/02/19  0550   [id number]*     Lab Results   Component Value Date    HGBA1C 6.8 (H) 12/02/2019     Lab Results   Component Value Date    TSH 2.070 11/13/2019         ASSESSMENT/PLAN:   I have reviewed the medications in compliance with CMS Regulation F756 of the [place]. No irregularities were noted at this time.    Medications reviewed by PharmD, please re-consult if needed.        Leslee Womack,  Pharm. D.  Clinical Pharmacist  [facility]

## 2019-12-03 NOTE — PROGRESS NOTES
Home health orders faxed to [facility]. Pt needs no DME discharge complete. Nurse informed.

## 2019-12-03 NOTE — HPI
Mrs Saxena is a  59-year-old female with PMHx of HTN, HLD, T2DM  who presents to SNF following a hospitalization for cord compression s/p elective posterior cervical spinal fusion and laminectomy for spinal stenosis decompression of C3-6.  Admission to SNF for secondary weakness and debility.      Patient has experienced gait abnormality for 1.5 month with history of gait imbalance, hand clumsiness, and hand weakness. She was evaluated by neurology.  Out patient MRI of C-spine done on 11/8  showed signficant cord compression and patient was called and told to emergently present to ED on 11/11. She was discharged with medrol dosepak to return for elective surgery.  [facility] for preop appointment with Dr. Cruz. Patient was admitted 11/19 and underwent posterior cervical spinal fusion and laminectomy for spinal stenosis decompression of C3-6 . Patient's operative and post-operative course was unremarkable; ESBL 100ml.  Postop patient will remain WBAT with cervical collar.      She states she has lower neck pain that radiates across her shoulders. She additionally complains of low back pain in certain positions. She states her BUE  paraesthesias have resolved. She finds the current pain regimen adequate over all.      Patient will be treated at [facility] with PT and OT to improve functional status and ability to perform ADLs.

## 2019-12-03 NOTE — DISCHARGE SUMMARY
[facility] Medicine  Discharge Summary      Patient Name: Lala Saxena  MRN: [id number]  Admission Date: 11/26/2019  Hospital Length of Stay: 7 days  Discharge Date and Time:  12/03/2019 2:28 PM  Attending Physician: No att. providers found   Discharging Provider: Janette Resendiz NP  Primary Care Provider: Edward Hernandez MD      HPI:   Mrs Saxena is a  59-year-old female with PMHx of HTN, HLD, T2DM  who presents to SNF following a hospitalization for cord compression s/p elective posterior cervical spinal fusion and laminectomy for spinal stenosis decompression of C3-6.  Admission to SNF for secondary weakness and debility.      Patient has experienced gait abnormality for 1.5 month with history of gait imbalance, hand clumsiness, and hand weakness. She was evaluated by neurology.  Out patient MRI of C-spine done on 11/8  showed signficant cord compression and patient was called and told to emergently present to ED on 11/11. She was discharged with medrol dosepak to return for elective surgery.  Patient went to [facility] Clinic on 11/18 for preop appointment with Dr. Cruz. Patient was admitted 11/19 and underwent posterior cervical spinal fusion and laminectomy for spinal stenosis decompression of C3-6 . Patient's operative and post-operative course was unremarkable; ESBL 100ml.  Postop patient will remain WBAT with cervical collar.      She states she has lower neck pain that radiates across her shoulders. She additionally complains of low back pain in certain positions. She states her BUE  paraesthesias have resolved. She finds the current pain regimen adequate over all.      Patient will be treated at [facility] with PT and OT to improve functional status and ability to perform ADLs.     * No surgery found *      Hospital Course:   Patient progressed well with PT and OT. Patient had no significant events during their stay at SNF. Home health was set up. DME was ordered if  needed. Follow up appointment to be made by patient within one week. All prescriptions and discharge instructions were ordered to be given to the patient prior to discharge.     PEx  Constitutional: Patient appears well-developed and in no distress   HENT:   Head: Normocephalic and atraumatic.   Eyes: Pupils are equal, round  Neck:  C-collar in place  Cardiovascular: Normal rate, regular rhythm and normal heart sounds.    Pulmonary/Chest: Effort normal and breath sounds are clear  Abdominal: Soft. Bowel sounds are hypoactive.   Musculoskeletal: Normal range of motion.   Neurological: Alert and oriented to person, place, and time.   Psychiatric: Normal mood and affect. Behavior is normal.   Skin: Skin is warm and dry. Surgical incision to back of neck     Consults:   Consults (From admission, onward)        Status Ordering Provider     Inpatient consult to Registered Dietitian/Nutritionist  Once     Provider:  (Not yet assigned)    MONO Garcia          No new Assessment & Plan notes have been filed under this hospital service since the last note was generated.  Service: Hospital Medicine    Final Active Diagnoses:    Diagnosis Date Noted POA    PRINCIPAL PROBLEM:  Cervical myelopathy [G95.9] 11/12/2019 Yes    Debility [R53.81] 11/27/2019 Yes    Acute blood loss anemia [D62] 11/27/2019 Yes    Hypothyroidism due to acquired atrophy of thyroid [E03.4] 11/27/2019 Yes    Fusion of spine of cervical region [M43.22] 11/26/2019 Yes    Hyperlipidemia [E78.5] 11/19/2019 Yes    Essential hypertension [I10] 11/07/2019 Yes    Type 2 diabetes mellitus, without long-term current use of insulin [E11.9] 11/07/2019 Yes      Problems Resolved During this Admission:       Discharged Condition: good    Disposition: Home-Health Care [facility]    Follow Up:  Follow-up Information     Schedule an appointment as soon as possible for a visit with Edward Hernandez MD.    Specialty:  Family Medicine  Why:  WITHIN 1-2  WEEKS  Contact information:  [address] [facility] [address]  [phone]                 Patient Instructions:      No driving until:   Order Comments: Cleared by PCP     Notify your health care provider if you experience any of the following:  temperature >100.4     Notify your health care provider if you experience any of the following:  persistent nausea and vomiting or diarrhea     Notify your health care provider if you experience any of the following:  severe uncontrolled pain     Notify your health care provider if you experience any of the following:  redness, tenderness, or signs of infection (pain, swelling, redness, odor or green/yellow discharge around incision site)     Notify your health care provider if you experience any of the following:  difficulty breathing or increased cough     Notify your health care provider if you experience any of the following:  persistent dizziness, light-headedness, or visual disturbances     Notify your health care provider if you experience any of the following:  increased confusion or weakness     Activity as tolerated       Significant Diagnostic Studies: Labs:   BMP:   Recent Labs   Lab 12/02/19  0550   *      K 4.4      CO2 31*   BUN 15   CREATININE 0.7   CALCIUM 10.0   MG 1.7    and CBC   Recent Labs   Lab 12/02/19  0550   WBC 12.62   HGB 9.3*   HCT 30.2*   *       Pending Diagnostic Studies:     None         Medications:  Reconciled Home Medications:      Medication List      START taking these medications    celecoxib 200 MG capsule  Commonly known as:  CeleBREX  Take 1 capsule (200 mg total) by mouth once daily.     gabapentin 100 MG capsule  Commonly known as:  NEURONTIN  Take 1 capsule (100 mg total) by mouth 3 (three) times daily.     methocarbamol 500 MG Tab  Commonly known as:  ROBAXIN  Take 1 tablet (500 mg total) by mouth 3 (three) times daily as needed (Muscle spasms).     oxyCODONE 5 MG immediate release  tablet  Commonly known as:  ROXICODONE  Take 1 tablet (5 mg total) by mouth every 6 (six) hours as needed for Pain.     polyethylene glycol 17 gram Pwpk  Commonly known as:  GLYCOLAX  Take 17 g by mouth once daily.  Start taking on:  December 4, 2019     senna-docusate 8.6-50 mg 8.6-50 mg per tablet  Commonly known as:  PERICOLACE  Take 2 tablets by mouth 2 (two) times daily.        CHANGE how you take these medications    losartan 50 MG tablet  Commonly known as:  COZAAR  Take 1 tablet (50 mg total) by mouth once daily.  Start taking on:  December 4, 2019  What changed:    · medication strength  · how much to take        CONTINUE taking these medications    acetaminophen 650 MG Tbsr  Commonly known as:  TYLENOL  Take 1 tablet (650 mg total) by mouth every 8 (eight) hours.     amLODIPine 5 MG tablet  Commonly known as:  NORVASC  Take 5 mg by mouth.     atorvastatin 10 MG tablet  Commonly known as:  LIPITOR  Take 10 mg by mouth once daily.     glimepiride 4 MG tablet  Commonly known as:  AMARYL  Take 4 mg by mouth daily with breakfast.     levothyroxine 50 MCG tablet  Commonly known as:  SYNTHROID  Take 50 mcg by mouth once daily.     metFORMIN 1000 MG tablet  Commonly known as:  GLUCOPHAGE  Take 1,000 mg by mouth 2 (two) times daily with meals.            Indwelling Lines/Drains at time of discharge:   Lines/Drains/Airways     None                 Time spent on the discharge of patient: 36 minutes  Patient was seen and examined on the date of discharge and determined to be suitable for discharge.         Janette Resendiz NP  Department of Hospital Medicine  [facility] - Sk[facility] Nursing Care

## 2019-12-03 NOTE — HOSPITAL COURSE
Patient progressed well with PT and OT. Patient had no significant events during their stay at SNF. Home health was set up. DME was ordered if needed. Follow up appointment to be made by patient within one week. All prescriptions and discharge instructions were ordered to be given to the patient prior to discharge.     PEx  Constitutional: Patient appears well-developed and in no distress   HENT:   Head: Normocephalic and atraumatic.   Eyes: Pupils are equal, round  Neck:  C-collar in place  Cardiovascular: Normal rate, regular rhythm and normal heart sounds.    Pulmonary/Chest: Effort normal and breath sounds are clear  Abdominal: Soft. Bowel sounds are hypoactive.   Musculoskeletal: Normal range of motion.   Neurological: Alert and oriented to person, place, and time.   Psychiatric: Normal mood and affect. Behavior is normal.   Skin: Skin is warm and dry. Surgical incision to back of neck

## 2019-12-03 NOTE — PLAN OF CARE
Problem: Adult Inpatient Plan of Care  Goal: Plan of Care Review  Outcome: Met  Goal: Patient-Specific Goal (Individualization)  Outcome: Met  Goal: Absence of Hospital-Acquired Illness or Injury  Outcome: Met  Goal: Optimal Comfort and Wellbeing  Outcome: Met  Goal: Readiness for Transition of Care  Outcome: Met  Goal: Rounds/Family Conference  Outcome: Met

## 2019-12-03 NOTE — PLAN OF CARE
[facility]    HOME HEALTH ORDERS  FACE TO FACE ENCOUNTER    Patient Name: Lala Saxena  YOB: 1960    PCP: Edward Hernandez MD   PCP Address: [address] [place], [place] [id number]  PCP Phone Number: [phone]  PCP Fax: [phone]    Encounter Date: 12/03/2019    Admit to Home Health    Diagnoses:  Active Hospital Problems    Diagnosis  POA    *Cervical myelopathy [G95.9]  Yes    Debility [R53.81]  Yes    Acute blood loss anemia [D62]  Yes    Hypothyroidism due to acquired atrophy of thyroid [E03.4]  Yes    Fusion of spine of cervical region [M43.22]  Yes    Hyperlipidemia [E78.5]  Yes    Essential hypertension [I10]  Yes    Type 2 diabetes mellitus, without long-term current use of insulin [E11.9]  Yes      Resolved Hospital Problems   No resolved problems to display.       Future Appointments   Date Time Provider Department Center   12/5/2019 11:00 AM Edward Hernandez MD [facility] IM [facility] PCW   12/23/2019  7:20 AM Janette Church MD [facility] MED [place]   1/3/2020  8:30 AM [facility] OI EOS [facility] EOS [facility]   1/3/2020  9:30 AM Quyen Ramírez PA-C [facility] SPINE [facility]     Follow-up Information     Schedule an appointment as soon as possible for a visit with Edward Hernandez MD.    Specialty:  Family Medicine  Why:  WITHIN 1-2 WEEKS  Contact information:  [address] [place], [place] [id number] [phone]                 I have seen and examined this patient face to face today. My clinical findings that support the need for the home health skilled services and home bound status are the following:  Weakness/numbness causing balance and gait disturbance due to Surgery making it taxing to leave home.    Allergies:Review of patient's allergies indicates:  No Known Allergies    Diet: diabetic diet: 2000 calorie    Activities: activity as tolerated.  Maintain C-collar for 12 weeks postop- surgery was 11/19- must be cleared by Ortho to remove  C-collar    Nursing:   SN to complete comprehensive assessment including routine vital signs. Instruct on disease process and s/s of complications to report to MD. Review/verify medication list sent home with the patient at time of discharge  and instruct patient/caregiver as needed. Frequency may be adjusted depending on start of care date.    Notify MD if SBP > 160 or < 90; DBP > 90 or < 50; HR > 120 or < 50; Temp > 101    CONSULTS:    Physical Therapy to evaluate and treat. Evaluate for home safety and equipment needs; Establish/upgrade home exercise program. Perform / instruct on therapeutic exercises, gait training, transfer training, and Range of Motion.  Occupational Therapy to evaluate and treat. Evaluate home environment for safety and equipment needs. Perform/Instruct on transfers, ADL training, ROM, and therapeutic exercises.  Aide to provide assistance with personal care, ADLs, and vital signs.    WOUND CARE ORDERS  Keep incision open to air; may cover with bandage if incision is rubbing in C-collar..  Cleanse with soap and water daily.  Do not submerge under water.     Medications: Review discharge medications with patient and family and provide education.      I certify that this patient is confined to her home and needs intermittent skilled nursing care, physical therapy and occupational therapy.

## 2019-12-06 DIAGNOSIS — Z12.39 BREAST CANCER SCREENING: ICD-10-CM

## 2019-12-10 ENCOUNTER — OFFICE VISIT (OUTPATIENT)
Dept: ORTHOPEDICS | Facility: CLINIC | Age: 59
End: 2019-12-10
Payer: COMMERCIAL

## 2019-12-10 VITALS — BODY MASS INDEX: 31.41 KG/M2 | WEIGHT: 184 LBS | HEIGHT: 64 IN

## 2019-12-10 DIAGNOSIS — Z98.1 S/P CERVICAL SPINAL FUSION: Primary | ICD-10-CM

## 2019-12-10 PROCEDURE — 99999 PR PBB SHADOW E&M-EST. PATIENT-LVL III: ICD-10-PCS | Mod: PBBFAC,,, | Performed by: PHYSICIAN ASSISTANT

## 2019-12-10 PROCEDURE — 99999 PR PBB SHADOW E&M-EST. PATIENT-LVL III: CPT | Mod: PBBFAC,,, | Performed by: PHYSICIAN ASSISTANT

## 2019-12-10 PROCEDURE — 99024 POSTOP FOLLOW-UP VISIT: CPT | Mod: S$GLB,,, | Performed by: PHYSICIAN ASSISTANT

## 2019-12-10 PROCEDURE — 99024 PR POST-OP FOLLOW-UP VISIT: ICD-10-PCS | Mod: S$GLB,,, | Performed by: PHYSICIAN ASSISTANT

## 2019-12-10 NOTE — PROGRESS NOTES
Date: 12/10/2019    Supervising Physician: Miguel Cruz M.D.    Date of Surgery: 11/19/2019    Procedure: C3-6 posterior laminectomy and fusion for cervical spondylitic myelopathy with acute progression    History: Lala Saxena is seen today for follow-up following the above listed procedure. Overall the patient is doing well but today notes she does not have any pain.   She reports her gait is improved, as well as her hand coordination.  She is at home.  She is doing well.  She is in a collar today.  She says the collar is hot and itchy.  she denies fever, chills, and sweats since the time of the surgery.       Exam:  Incision is healing well.   There is no sign of infection. Neuro exam is stable. No signs of DVT.    Radiographs: no new imaging today    Assessment/Plan: 3 weeks post op.    Doing well postoperatively. Continue c collar for at least 12 weeks.  She may remove collar to eat, sleep and shower.      I will plan to see the patient back for the next postop visit in 3 weeks with imaging.     Thank you for the opportunity to participate in this patient's care. Please give me a call if there are any concerns or questions.

## 2019-12-11 ENCOUNTER — TELEPHONE (OUTPATIENT)
Dept: ORTHOPEDICS | Facility: CLINIC | Age: 59
End: 2019-12-11

## 2019-12-11 DIAGNOSIS — Z98.1 S/P CERVICAL SPINAL FUSION: Primary | ICD-10-CM

## 2020-01-03 ENCOUNTER — HOSPITAL ENCOUNTER (OUTPATIENT)
Dept: RADIOLOGY | Facility: HOSPITAL | Age: 60
Discharge: HOME | End: 2020-01-03
Attending: PHYSICIAN ASSISTANT
Payer: COMMERCIAL

## 2020-01-03 ENCOUNTER — OFFICE VISIT (OUTPATIENT)
Dept: ORTHOPEDICS | Facility: CLINIC | Age: 60
End: 2020-01-03
Payer: COMMERCIAL

## 2020-01-03 VITALS — BODY MASS INDEX: 31.41 KG/M2 | WEIGHT: 184 LBS | HEIGHT: 64 IN

## 2020-01-03 DIAGNOSIS — Z98.1 S/P CERVICAL SPINAL FUSION: ICD-10-CM

## 2020-01-03 DIAGNOSIS — Z98.1 S/P CERVICAL SPINAL FUSION: Primary | ICD-10-CM

## 2020-01-03 PROCEDURE — 99999 PR PBB SHADOW E&M-EST. PATIENT-LVL III: CPT | Mod: PBBFAC,,, | Performed by: PHYSICIAN ASSISTANT

## 2020-01-03 PROCEDURE — 99024 PR POST-OP FOLLOW-UP VISIT: ICD-10-PCS | Mod: S$GLB,,, | Performed by: PHYSICIAN ASSISTANT

## 2020-01-03 PROCEDURE — 72040 XR CERVICAL SPINE AP LATERAL: ICD-10-PCS | Mod: 26,,, | Performed by: RADIOLOGY

## 2020-01-03 PROCEDURE — 72040 X-RAY EXAM NECK SPINE 2-3 VW: CPT | Mod: TC

## 2020-01-03 PROCEDURE — 99999 PR PBB SHADOW E&M-EST. PATIENT-LVL III: ICD-10-PCS | Mod: PBBFAC,,, | Performed by: PHYSICIAN ASSISTANT

## 2020-01-03 PROCEDURE — 72040 X-RAY EXAM NECK SPINE 2-3 VW: CPT | Mod: 26,,, | Performed by: RADIOLOGY

## 2020-01-03 PROCEDURE — 99024 POSTOP FOLLOW-UP VISIT: CPT | Mod: S$GLB,,, | Performed by: PHYSICIAN ASSISTANT

## 2020-01-03 NOTE — PROGRESS NOTES
Date: 01/03/2020    Supervising Physician: Miguel Cruz M.D.    Date of Surgery: 11/19/2019    Procedure: C3-6 posterior laminectomy and fusion for cervical spondylitic myelopathy with acute progression    History: Lala Saxena is seen today for follow-up following the above listed procedure. Overall the patient is doing well but today notes she does not have any pain.   She reports her gait is continuing to improve, as well as her hand coordination.  She is walking with a cane.  She is at home.  She is doing great.  She is in a collar today.  she denies fever, chills, and sweats since the time of the surgery.       Exam:  Incision is healing well.   There is no sign of infection. Neuro exam is stable. No signs of DVT.    Radiographs: imaging today shows hardware in place, no evidence of failure.     Assessment/Plan: 6 weeks post op.    Doing well postoperatively. Continue c collar for at least 12 weeks.      I will plan to see the patient back for the next postop visit in 6 weeks with imaging.     Thank you for the opportunity to participate in this patient's care. Please give me a call if there are any concerns or questions.

## 2020-01-14 ENCOUNTER — OFFICE VISIT (OUTPATIENT)
Dept: INTERNAL MEDICINE | Facility: CLINIC | Age: 60
End: 2020-01-14
Payer: COMMERCIAL

## 2020-01-14 VITALS
HEART RATE: 95 BPM | BODY MASS INDEX: 31.09 KG/M2 | WEIGHT: 182.13 LBS | DIASTOLIC BLOOD PRESSURE: 72 MMHG | HEIGHT: 64 IN | SYSTOLIC BLOOD PRESSURE: 146 MMHG | OXYGEN SATURATION: 99 %

## 2020-01-14 DIAGNOSIS — E78.2 MIXED HYPERLIPIDEMIA: ICD-10-CM

## 2020-01-14 DIAGNOSIS — G95.9 CERVICAL MYELOPATHY: ICD-10-CM

## 2020-01-14 DIAGNOSIS — I10 ESSENTIAL HYPERTENSION: Primary | ICD-10-CM

## 2020-01-14 DIAGNOSIS — E03.4 HYPOTHYROIDISM DUE TO ACQUIRED ATROPHY OF THYROID: ICD-10-CM

## 2020-01-14 DIAGNOSIS — E11.40 TYPE 2 DIABETES MELLITUS WITH DIABETIC NEUROPATHY, WITHOUT LONG-TERM CURRENT USE OF INSULIN: ICD-10-CM

## 2020-01-14 DIAGNOSIS — R11.0 NAUSEA: ICD-10-CM

## 2020-01-14 PROBLEM — R27.0 ATAXIA: Status: RESOLVED | Noted: 2019-11-05 | Resolved: 2020-01-14

## 2020-01-14 PROBLEM — R29.898 LEFT HAND WEAKNESS: Status: RESOLVED | Noted: 2019-11-05 | Resolved: 2020-01-14

## 2020-01-14 PROBLEM — D62 ACUTE BLOOD LOSS ANEMIA: Status: RESOLVED | Noted: 2019-11-27 | Resolved: 2020-01-14

## 2020-01-14 PROCEDURE — 3008F PR BODY MASS INDEX (BMI) DOCUMENTED: ICD-10-PCS | Mod: CPTII,S$GLB,, | Performed by: FAMILY MEDICINE

## 2020-01-14 PROCEDURE — 3008F BODY MASS INDEX DOCD: CPT | Mod: CPTII,S$GLB,, | Performed by: FAMILY MEDICINE

## 2020-01-14 PROCEDURE — 3078F DIAST BP <80 MM HG: CPT | Mod: CPTII,S$GLB,, | Performed by: FAMILY MEDICINE

## 2020-01-14 PROCEDURE — 99999 PR PBB SHADOW E&M-EST. PATIENT-LVL III: CPT | Mod: PBBFAC,,, | Performed by: FAMILY MEDICINE

## 2020-01-14 PROCEDURE — 3077F SYST BP >= 140 MM HG: CPT | Mod: CPTII,S$GLB,, | Performed by: FAMILY MEDICINE

## 2020-01-14 PROCEDURE — 3078F PR MOST RECENT DIASTOLIC BLOOD PRESSURE < 80 MM HG: ICD-10-PCS | Mod: CPTII,S$GLB,, | Performed by: FAMILY MEDICINE

## 2020-01-14 PROCEDURE — 3077F PR MOST RECENT SYSTOLIC BLOOD PRESSURE >= 140 MM HG: ICD-10-PCS | Mod: CPTII,S$GLB,, | Performed by: FAMILY MEDICINE

## 2020-01-14 PROCEDURE — 99214 PR OFFICE/OUTPT VISIT, EST, LEVL IV, 30-39 MIN: ICD-10-PCS | Mod: S$GLB,,, | Performed by: FAMILY MEDICINE

## 2020-01-14 PROCEDURE — 3044F HG A1C LEVEL LT 7.0%: CPT | Mod: CPTII,S$GLB,, | Performed by: FAMILY MEDICINE

## 2020-01-14 PROCEDURE — 99999 PR PBB SHADOW E&M-EST. PATIENT-LVL III: ICD-10-PCS | Mod: PBBFAC,,, | Performed by: FAMILY MEDICINE

## 2020-01-14 PROCEDURE — 3044F PR MOST RECENT HEMOGLOBIN A1C LEVEL <7.0%: ICD-10-PCS | Mod: CPTII,S$GLB,, | Performed by: FAMILY MEDICINE

## 2020-01-14 PROCEDURE — 99214 OFFICE O/P EST MOD 30 MIN: CPT | Mod: S$GLB,,, | Performed by: FAMILY MEDICINE

## 2020-01-14 RX ORDER — ONDANSETRON 4 MG/1
4 TABLET, ORALLY DISINTEGRATING ORAL EVERY 8 HOURS PRN
Qty: 30 TABLET | Refills: 0 | Status: SHIPPED | OUTPATIENT
Start: 2020-01-14 | End: 2021-12-07

## 2020-01-14 RX ORDER — GABAPENTIN 100 MG/1
100 CAPSULE ORAL 3 TIMES DAILY
Qty: 90 CAPSULE | Refills: 11 | Status: SHIPPED | OUTPATIENT
Start: 2020-01-14 | End: 2020-02-13

## 2020-01-14 RX ORDER — AMLODIPINE BESYLATE 10 MG/1
10 TABLET ORAL DAILY
COMMUNITY
End: 2020-02-06 | Stop reason: SDUPTHER

## 2020-01-14 RX ORDER — CELECOXIB 200 MG/1
200 CAPSULE ORAL 2 TIMES DAILY PRN
COMMUNITY
End: 2020-05-20 | Stop reason: SDUPTHER

## 2020-01-14 RX ORDER — GLIMEPIRIDE 4 MG/1
4 TABLET ORAL
Qty: 90 TABLET | Refills: 3 | Status: SHIPPED | OUTPATIENT
Start: 2020-01-14 | End: 2020-03-20 | Stop reason: SDUPTHER

## 2020-01-14 RX ORDER — LEVOTHYROXINE SODIUM 50 UG/1
50 TABLET ORAL DAILY
Qty: 90 TABLET | Refills: 3 | Status: SHIPPED | OUTPATIENT
Start: 2020-01-14 | End: 2020-03-20 | Stop reason: SDUPTHER

## 2020-01-14 NOTE — PROGRESS NOTES
"Subjective:       Patient ID: Lala Saxena is a 59 y.o. female.    Chief Complaint: Follow-up    HPI    59yoF with PMHx HTN, hypothyroid, DM for follow up. LOV 11/7/19. Former PCP in [place] who started most of meds was Dr. Bernadette Juarez     C/o constipation and nausea today and wondering about using miralax and how often to use it.  Has not taken opioid since right after operation.    Participating in home exercises to work on strength and walking around house without use of cane. "feels 100% better compared to before the procedure." Walked up stairs twice this past week which is new for her. Has a walking track across the street which is her goal eventually.    #Ortho: symptomatic cervical myelopathy; s/p C3-6 laminectomy and fusion on 11/19/19  - est with Dr. Cruz  - LOV 1/3/20 for post op 6wks --> f/u 6 wks, continue c collar for 12 wks total     #Neurology -- LOV 11/5/19 Dr. Rao  - MRI 11/8/19 --> ED --> Ortho     #DM - last A1c 12/2/19 = 6.8  - current reg: Metformin 1000mg bid, Sulfonylurea, Lipitor     #HTN - new diagosis earlier this year by MD in [place]  - elevated today  - current reg: Norvasc 10mg qd; Losartan 50mg qd    Review of Systems   Constitutional: Negative for appetite change, fatigue and fever.   HENT: Negative for ear pain, sinus pain and sore throat.    Respiratory: Negative for cough and shortness of breath.    Cardiovascular: Negative for chest pain and palpitations.   Gastrointestinal: Positive for constipation. Negative for abdominal pain, diarrhea, nausea and vomiting.   Genitourinary: Negative for dysuria.   Musculoskeletal: Positive for neck pain. Negative for back pain.   Skin: Negative for rash.   Neurological: Positive for weakness. Negative for numbness and headaches.         Past Medical History:   Diagnosis Date    Acute blood loss anemia 11/27/2019    Ataxia 11/5/2019    Diabetes mellitus, type 2     Hyperlipidemia     Hypertension     Left hand weakness " "11/5/2019    Thyroid disease         Prior to Admission medications    Medication Sig Start Date End Date Taking? Authorizing Provider   acetaminophen (TYLENOL) 650 MG TbSR Take 1 tablet (650 mg total) by mouth every 8 (eight) hours. 11/19/19   Derek Lopez MD   amLODIPine (NORVASC) 5 MG tablet Take 5 mg by mouth. 9/25/19 9/24/20  Historical Provider, MD   atorvastatin (LIPITOR) 10 MG tablet Take 10 mg by mouth once daily.    Historical Provider, MD   gabapentin (NEURONTIN) 100 MG capsule Take 1 capsule (100 mg total) by mouth 3 (three) times daily. 12/3/19 1/2/20  Janette Resendiz NP   glimepiride (AMARYL) 4 MG tablet Take 4 mg by mouth daily with breakfast.  9/9/19   Historical Provider, MD   levothyroxine (SYNTHROID) 50 MCG tablet Take 50 mcg by mouth once daily.    Historical Provider, MD   losartan (COZAAR) 50 MG tablet Take 1 tablet (50 mg total) by mouth once daily. 12/4/19 12/3/20  Janette Resendiz NP   metFORMIN (GLUCOPHAGE) 1000 MG tablet Take 1,000 mg by mouth 2 (two) times daily with meals.  9/9/19   Historical Provider, MD   oxyCODONE (ROXICODONE) 5 MG immediate release tablet Take 1 tablet (5 mg total) by mouth every 6 (six) hours as needed for Pain. 12/3/19   Janette Resendiz NP   polyethylene glycol (GLYCOLAX) 17 gram PwPk Take 17 g by mouth once daily. 12/4/19   Janette Resendiz NP   senna-docusate 8.6-50 mg (PERICOLACE) 8.6-50 mg per tablet Take 2 tablets by mouth 2 (two) times daily. 12/3/19   Janette Resendiz NP        Past medical history, surgical history, and family medical history reviewed and updated as appropriate.    Medications and allergies reviewed.     Objective:          Vitals:    01/14/20 0932   BP: (!) 146/72   BP Location: Left arm   Patient Position: Sitting   BP Method: Medium (Manual)   Pulse: 95   SpO2: 99%   Weight: 82.6 kg (182 lb 1.6 oz)   Height: 5' 4" (1.626 m)     Body mass index is 31.26 kg/m².  Physical Exam   Constitutional: She is oriented to person, " place, and time. She appears well-developed and well-nourished.   Eyes: EOM are normal.   Neck:   Cervical neck collar s/p surgery   Cardiovascular: Normal rate, regular rhythm and normal heart sounds.   Pulmonary/Chest: Effort normal and breath sounds normal. No respiratory distress. She has no wheezes.   Neurological: She is alert and oriented to person, place, and time.   Psychiatric: She has a normal mood and affect. Her behavior is normal.   Vitals reviewed.      Lab Results   Component Value Date    WBC 12.62 12/02/2019    HGB 9.3 (L) 12/02/2019    HCT 30.2 (L) 12/02/2019     (H) 12/02/2019    ALT 16 11/11/2019    AST 14 11/11/2019     12/02/2019    K 4.4 12/02/2019     12/02/2019    CREATININE 0.7 12/02/2019    BUN 15 12/02/2019    CO2 31 (H) 12/02/2019    TSH 2.070 11/13/2019    INR 1.0 11/11/2019    HGBA1C 6.8 (H) 12/02/2019       Assessment:       1. Essential hypertension    2. Cervical myelopathy    3. Mixed hyperlipidemia    4. Type 2 diabetes mellitus with diabetic neuropathy, without long-term current use of insulin    5. Hypothyroidism due to acquired atrophy of thyroid    6. Nausea        Plan:   Lala was seen today for follow-up.    Diagnoses and all orders for this visit:    Refills today, f/u ortho next month, will revisit labwork next ov with me and recheck tsh, a1c, lipids, etc.    Essential hypertension  Continue current doses, f/u next visit.     Cervical myelopathy  -     gabapentin (NEURONTIN) 100 MG capsule; Take 1 capsule (100 mg total) by mouth 3 (three) times daily.    Mixed hyperlipidemia    Type 2 diabetes mellitus with diabetic neuropathy, without long-term current use of insulin  -     glimepiride (AMARYL) 4 MG tablet; Take 1 tablet (4 mg total) by mouth daily with breakfast.    Hypothyroidism due to acquired atrophy of thyroid  -     levothyroxine (SYNTHROID) 50 MCG tablet; Take 1 tablet (50 mcg total) by mouth once daily.    Nausea  -     ondansetron  (ZOFRAN-ODT) 4 MG TbDL; Take 1 tablet (4 mg total) by mouth every 8 (eight) hours as needed.    Follow up in about 3 months (around 4/14/2020).    Edward Hernandez MD  Family Medicine  [facility]  1/14/2020

## 2020-02-06 RX ORDER — AMLODIPINE BESYLATE 10 MG/1
10 TABLET ORAL DAILY
Qty: 90 TABLET | Refills: 3 | Status: SHIPPED | OUTPATIENT
Start: 2020-02-06 | End: 2021-04-13

## 2020-02-06 NOTE — TELEPHONE ENCOUNTER
----- Message from Yvonne Haddad sent at 2/6/2020 12:38 PM CST -----  Contact: [phone]  Type: Rx    Name of medication(s): amLODIPine (NORVASC) 10 MG tablet    Is this a refill? New rx? Refill     Pharmacy Name, Phone, & Location:[place] [address]   [phone] (Phone)  [phone] (Fax    Comments:please advise, thanks

## 2020-02-17 ENCOUNTER — HOSPITAL ENCOUNTER (OUTPATIENT)
Dept: RADIOLOGY | Facility: HOSPITAL | Age: 60
Discharge: HOME | End: 2020-02-17
Attending: PHYSICIAN ASSISTANT
Payer: COMMERCIAL

## 2020-02-17 ENCOUNTER — OFFICE VISIT (OUTPATIENT)
Dept: ORTHOPEDICS | Facility: CLINIC | Age: 60
End: 2020-02-17
Payer: COMMERCIAL

## 2020-02-17 VITALS — WEIGHT: 182 LBS | BODY MASS INDEX: 31.07 KG/M2 | HEIGHT: 64 IN

## 2020-02-17 DIAGNOSIS — Z98.1 S/P CERVICAL SPINAL FUSION: ICD-10-CM

## 2020-02-17 DIAGNOSIS — Z98.1 S/P CERVICAL SPINAL FUSION: Primary | ICD-10-CM

## 2020-02-17 PROCEDURE — 72040 XR CERVICAL SPINE AP LATERAL: ICD-10-PCS | Mod: 26,,, | Performed by: RADIOLOGY

## 2020-02-17 PROCEDURE — 99024 PR POST-OP FOLLOW-UP VISIT: ICD-10-PCS | Mod: S$GLB,,, | Performed by: PHYSICIAN ASSISTANT

## 2020-02-17 PROCEDURE — 99999 PR PBB SHADOW E&M-EST. PATIENT-LVL III: ICD-10-PCS | Mod: PBBFAC,,, | Performed by: PHYSICIAN ASSISTANT

## 2020-02-17 PROCEDURE — 72040 X-RAY EXAM NECK SPINE 2-3 VW: CPT | Mod: TC

## 2020-02-17 PROCEDURE — 72040 X-RAY EXAM NECK SPINE 2-3 VW: CPT | Mod: 26,,, | Performed by: RADIOLOGY

## 2020-02-17 PROCEDURE — 99999 PR PBB SHADOW E&M-EST. PATIENT-LVL III: CPT | Mod: PBBFAC,,, | Performed by: PHYSICIAN ASSISTANT

## 2020-02-17 PROCEDURE — 99024 POSTOP FOLLOW-UP VISIT: CPT | Mod: S$GLB,,, | Performed by: PHYSICIAN ASSISTANT

## 2020-02-17 NOTE — PROGRESS NOTES
Date: 02/17/2020    Supervising Physician: Miguel Cruz M.D.    Date of Surgery: 11/19/2019    Procedure: C3-6 posterior laminectomy and fusion for cervical spondylitic myelopathy with acute progression    History: Lala Saxena is seen today for follow-up following the above listed procedure. Overall the patient is doing well but today notes she does not have any pain.   She reports her gait is continuing to improve, as well as her hand coordination.  She is walking with a cane.  She is at home.  She is doing great.  She is in a collar today.  She is pleased.       Exam:  Incision is healing well.   There is no sign of infection. Neuro exam is stable. No signs of DVT.    Radiographs: imaging today shows hardware in place, no evidence of failure.     Assessment/Plan: 12 weeks post op.    Doing well postoperatively. She may wean collar and progress activities.  She is not interested in PT at this time. She will continue doing exercises on her own at home.     I will plan to see the patient back for the next postop visit in 3 months.     Thank you for the opportunity to participate in this patient's care. Please give me a call if there are any concerns or questions.

## 2020-02-20 ENCOUNTER — TELEPHONE (OUTPATIENT)
Dept: ORTHOPEDICS | Facility: CLINIC | Age: 60
End: 2020-02-20

## 2020-02-20 NOTE — TELEPHONE ENCOUNTER
Returned patients call, unable to leave message.    ----- Message from [place] sent at 2/20/2020  1:35 PM CST -----  Contact: Pt  Pt would like a call back regarding getting signed up for therapy.    Pt can be reached at [phone]

## 2020-03-02 DIAGNOSIS — G95.9 CERVICAL MYELOPATHY: ICD-10-CM

## 2020-03-02 DIAGNOSIS — Z98.1 S/P CERVICAL SPINAL FUSION: Primary | ICD-10-CM

## 2020-03-20 DIAGNOSIS — E03.4 HYPOTHYROIDISM DUE TO ACQUIRED ATROPHY OF THYROID: ICD-10-CM

## 2020-03-20 DIAGNOSIS — E11.40 TYPE 2 DIABETES MELLITUS WITH DIABETIC NEUROPATHY, WITHOUT LONG-TERM CURRENT USE OF INSULIN: ICD-10-CM

## 2020-03-20 RX ORDER — METFORMIN HYDROCHLORIDE 1000 MG/1
1000 TABLET ORAL 2 TIMES DAILY WITH MEALS
Qty: 180 TABLET | Refills: 1 | Status: SHIPPED | OUTPATIENT
Start: 2020-03-20 | End: 2020-12-11

## 2020-03-20 RX ORDER — LOSARTAN POTASSIUM 50 MG/1
50 TABLET ORAL DAILY
Qty: 90 TABLET | Refills: 1 | Status: SHIPPED | OUTPATIENT
Start: 2020-03-20 | End: 2020-11-02

## 2020-03-20 RX ORDER — LEVOTHYROXINE SODIUM 50 UG/1
50 TABLET ORAL DAILY
Qty: 90 TABLET | Refills: 1 | Status: SHIPPED | OUTPATIENT
Start: 2020-03-20 | End: 2021-04-06

## 2020-03-20 RX ORDER — GLIMEPIRIDE 4 MG/1
4 TABLET ORAL
Qty: 90 TABLET | Refills: 1 | Status: SHIPPED | OUTPATIENT
Start: 2020-03-20 | End: 2021-04-27

## 2020-03-20 NOTE — TELEPHONE ENCOUNTER
----- Message from Evelyn Cordova sent at 3/20/2020  1:52 PM CDT -----  Contact: patient [phone]    RX name and strength: metFORMIN (GLUCOPHAGE) 1000  Directions (copy/paste from chart):  Take 1,000 mg by mouth 2 (two) times daily with meals  Is this a 30 day or 90 day RX:  90  Local pharmacy or mail order pharmacy:  local  Pharmacy name and phone #   [facility] [phone]      Requesting an RX refill or new RX.  Is this a refill or new RX:  refill  RX name and strength: losartan (COZAAR) 50 MG tablet  Directions (copy/paste from chart):  1 tab daily  Is this a 30 day or 90 day RX:  90  Local pharmacy or mail order pharmacy:    Pharmacy name and phone #[facility] [phone]       Comments:        Requesting an RX refill or new RX.  Is this a refill or new RX:  r efill  RX name and strength: glimepiride (AMARYL) 4 MG tablet  Directions (copy/paste from chart):  Route: Take 1 tablet (4 mg total) by mouth daily with breakfast.   Is this a 30 day or 90 day RX:  90  Local pharmacy or mail order pharmacy:  local  Pharmacy name and phone #  [facility] [phone]         Comments:        Requesting an RX refill or new RX.  Is this a refill or new RX:  refill  RX name and strength: levothyroxine (SYNTHROID) 50 MCG table  Directions (copy/paste from chart):   Take 1 tablet (50 mcg total) by mouth once daily. -   Is this a 30 day or 90 day RX:  90  Local pharmacy or mail order pharmacy:  local  Pharmacy name and phone # :    [facility] [phone]         Comments:

## 2020-03-21 RX ORDER — LOSARTAN POTASSIUM 50 MG/1
TABLET ORAL
Refills: 0 | OUTPATIENT
Start: 2020-03-21

## 2020-03-21 RX ORDER — CELECOXIB 200 MG/1
CAPSULE ORAL
Refills: 0 | OUTPATIENT
Start: 2020-03-21

## 2020-04-06 ENCOUNTER — TELEPHONE (OUTPATIENT)
Dept: INTERNAL MEDICINE | Facility: CLINIC | Age: 60
End: 2020-04-06

## 2020-04-06 NOTE — TELEPHONE ENCOUNTER
----- Message from Jesusliane Riccardo sent at 4/6/2020 11:03 AM CDT -----  Contact: self   Patient state her  tested positive for COVID-19 and she want to speak to the doctor about what she should do and is askig for a Rx for her. Patient state she is not having any symptoms. Please call and advise.

## 2020-04-30 ENCOUNTER — OFFICE VISIT (OUTPATIENT)
Dept: INTERNAL MEDICINE | Facility: CLINIC | Age: 60
End: 2020-04-30
Payer: COMMERCIAL

## 2020-04-30 VITALS
BODY MASS INDEX: 31.02 KG/M2 | HEART RATE: 88 BPM | OXYGEN SATURATION: 99 % | WEIGHT: 175.06 LBS | HEIGHT: 63 IN | SYSTOLIC BLOOD PRESSURE: 134 MMHG | DIASTOLIC BLOOD PRESSURE: 70 MMHG

## 2020-04-30 DIAGNOSIS — R82.90 ABNORMAL URINE ODOR: ICD-10-CM

## 2020-04-30 DIAGNOSIS — N30.00 ACUTE CYSTITIS WITHOUT HEMATURIA: ICD-10-CM

## 2020-04-30 DIAGNOSIS — R10.9 ABDOMINAL PRESSURE: Primary | ICD-10-CM

## 2020-04-30 DIAGNOSIS — I10 ESSENTIAL HYPERTENSION: ICD-10-CM

## 2020-04-30 DIAGNOSIS — E11.40 TYPE 2 DIABETES MELLITUS WITH DIABETIC NEUROPATHY, WITHOUT LONG-TERM CURRENT USE OF INSULIN: ICD-10-CM

## 2020-04-30 DIAGNOSIS — E03.4 HYPOTHYROIDISM DUE TO ACQUIRED ATROPHY OF THYROID: ICD-10-CM

## 2020-04-30 DIAGNOSIS — K59.00 CONSTIPATION, UNSPECIFIED CONSTIPATION TYPE: ICD-10-CM

## 2020-04-30 DIAGNOSIS — Z79.899 ENCOUNTER FOR LONG-TERM (CURRENT) USE OF OTHER MEDICATIONS: ICD-10-CM

## 2020-04-30 DIAGNOSIS — Z00.00 ANNUAL PHYSICAL EXAM: ICD-10-CM

## 2020-04-30 LAB
BACTERIA #/AREA URNS AUTO: ABNORMAL /HPF
BILIRUB UR QL STRIP: NEGATIVE
CLARITY UR REFRACT.AUTO: ABNORMAL
COLOR UR AUTO: YELLOW
GLUCOSE UR QL STRIP: NEGATIVE
HGB UR QL STRIP: NEGATIVE
KETONES UR QL STRIP: NEGATIVE
LEUKOCYTE ESTERASE UR QL STRIP: ABNORMAL
MICROSCOPIC COMMENT: ABNORMAL
NITRITE UR QL STRIP: NEGATIVE
PH UR STRIP: 5 [PH] (ref 5–8)
PROT UR QL STRIP: NEGATIVE
RBC #/AREA URNS AUTO: 2 /HPF (ref 0–4)
SP GR UR STRIP: 1 (ref 1–1.03)
SQUAMOUS #/AREA URNS AUTO: 1 /HPF
URN SPEC COLLECT METH UR: ABNORMAL
WBC #/AREA URNS AUTO: >100 /HPF (ref 0–5)
WBC CLUMPS UR QL AUTO: ABNORMAL

## 2020-04-30 PROCEDURE — 3075F SYST BP GE 130 - 139MM HG: CPT | Mod: CPTII,S$GLB,, | Performed by: FAMILY MEDICINE

## 2020-04-30 PROCEDURE — 3044F HG A1C LEVEL LT 7.0%: CPT | Mod: CPTII,S$GLB,, | Performed by: FAMILY MEDICINE

## 2020-04-30 PROCEDURE — 99999 PR PBB SHADOW E&M-EST. PATIENT-LVL III: CPT | Mod: PBBFAC,,, | Performed by: FAMILY MEDICINE

## 2020-04-30 PROCEDURE — 99214 PR OFFICE/OUTPT VISIT, EST, LEVL IV, 30-39 MIN: ICD-10-PCS | Mod: S$GLB,,, | Performed by: FAMILY MEDICINE

## 2020-04-30 PROCEDURE — 3078F PR MOST RECENT DIASTOLIC BLOOD PRESSURE < 80 MM HG: ICD-10-PCS | Mod: CPTII,S$GLB,, | Performed by: FAMILY MEDICINE

## 2020-04-30 PROCEDURE — 3044F PR MOST RECENT HEMOGLOBIN A1C LEVEL <7.0%: ICD-10-PCS | Mod: CPTII,S$GLB,, | Performed by: FAMILY MEDICINE

## 2020-04-30 PROCEDURE — 3008F BODY MASS INDEX DOCD: CPT | Mod: CPTII,S$GLB,, | Performed by: FAMILY MEDICINE

## 2020-04-30 PROCEDURE — 87077 CULTURE AEROBIC IDENTIFY: CPT

## 2020-04-30 PROCEDURE — 99999 PR PBB SHADOW E&M-EST. PATIENT-LVL III: ICD-10-PCS | Mod: PBBFAC,,, | Performed by: FAMILY MEDICINE

## 2020-04-30 PROCEDURE — 3008F PR BODY MASS INDEX (BMI) DOCUMENTED: ICD-10-PCS | Mod: CPTII,S$GLB,, | Performed by: FAMILY MEDICINE

## 2020-04-30 PROCEDURE — 81001 URINALYSIS AUTO W/SCOPE: CPT

## 2020-04-30 PROCEDURE — 87186 SC STD MICRODIL/AGAR DIL: CPT

## 2020-04-30 PROCEDURE — 87088 URINE BACTERIA CULTURE: CPT

## 2020-04-30 PROCEDURE — 3078F DIAST BP <80 MM HG: CPT | Mod: CPTII,S$GLB,, | Performed by: FAMILY MEDICINE

## 2020-04-30 PROCEDURE — 87086 URINE CULTURE/COLONY COUNT: CPT

## 2020-04-30 PROCEDURE — 99214 OFFICE O/P EST MOD 30 MIN: CPT | Mod: S$GLB,,, | Performed by: FAMILY MEDICINE

## 2020-04-30 PROCEDURE — 3075F PR MOST RECENT SYSTOLIC BLOOD PRESS GE 130-139MM HG: ICD-10-PCS | Mod: CPTII,S$GLB,, | Performed by: FAMILY MEDICINE

## 2020-04-30 RX ORDER — GABAPENTIN 100 MG/1
CAPSULE ORAL
COMMUNITY
Start: 2020-03-19

## 2020-04-30 NOTE — PROGRESS NOTES
Subjective:       Patient ID: Lala Saxena is a 60 y.o. female.    Chief Complaint: Abdominal Pain (Pressure pain at the bottom of her abdomen, bloating) and Constipation    HPI    60yoF with PMHx HTN, hypothyroid, DM for same day visit.    Today c/o constipation and nausea. BM yesterday but had been constipated x 1 wk prior. Normal is 3-4x/wk. Ex-lax otc which promoted bm. Foul odor to urine. Denies burning.    Chronic hx below:    #Endo: DM (last A1c 12/2019 = 6.8)  - current reg: Metformin 1000mg bid, Sulfonylurea, Lipitor     #Ortho: symptomatic cervical myelopathy; s/p C3-6 laminectomy and fusion on 11/19/19  - est with Dr. Cruz  - LOV 2/2020 for 12 wk post op --> f/u 3 months     #Neurology -- LOV 11/5/19 Dr. Rao  - MRI 11/8/19 --> ED --> Ortho     #HTN - new diagosis earlier this year by MD in [place]  - current reg: Norvasc 10mg qd; Losartan 50mg qd    Review of Systems   Constitutional: Negative for chills and fever.   Respiratory: Negative for cough and shortness of breath.    Cardiovascular: Negative for chest pain.   Gastrointestinal: Positive for abdominal pain.   Genitourinary: Negative for decreased urine volume, difficulty urinating, dysuria, flank pain, frequency, hematuria and urgency.   Musculoskeletal: Positive for back pain and neck pain. Negative for neck stiffness.         Past Medical History:   Diagnosis Date    Acute blood loss anemia 11/27/2019    Ataxia 11/5/2019    Diabetes mellitus, type 2     Hyperlipidemia     Hypertension     Left hand weakness 11/5/2019    Thyroid disease         Prior to Admission medications    Medication Sig Start Date End Date Taking? Authorizing Provider   amLODIPine (NORVASC) 10 MG tablet Take 1 tablet (10 mg total) by mouth once daily. 2/6/20   Edward Hernandez MD   atorvastatin (LIPITOR) 10 MG tablet Take 10 mg by mouth once daily.    Historical Provider, MD   celecoxib (CELEBREX) 200 MG capsule Take 200 mg by mouth 2 (two) times daily as  "needed for Pain.    Historical Provider, MD   gabapentin (NEURONTIN) 100 MG capsule Take 1 capsule (100 mg total) by mouth 3 (three) times daily. 1/14/20 2/13/20  Edward Hernandez MD   glimepiride (AMARYL) 4 MG tablet Take 1 tablet (4 mg total) by mouth daily with breakfast. 3/20/20   Edward Hernandez MD   levothyroxine (SYNTHROID) 50 MCG tablet Take 1 tablet (50 mcg total) by mouth once daily. 3/20/20   Edward Hernandez MD   losartan (COZAAR) 50 MG tablet Take 1 tablet (50 mg total) by mouth once daily. 3/20/20 3/20/21  Edward Hernandez MD   metFORMIN (GLUCOPHAGE) 1000 MG tablet Take 1 tablet (1,000 mg total) by mouth 2 (two) times daily with meals. 3/20/20   Edward Hernandez MD   ondansetron (ZOFRAN-ODT) 4 MG TbDL Take 1 tablet (4 mg total) by mouth every 8 (eight) hours as needed. 1/14/20   Edward Hernandez MD   oxyCODONE (ROXICODONE) 5 MG immediate release tablet Take 1 tablet (5 mg total) by mouth every 6 (six) hours as needed for Pain.  Patient not taking: Reported on 1/14/2020 12/3/19   Janette Resendiz NP   polyethylene glycol (GLYCOLAX) 17 gram PwPk Take 17 g by mouth once daily. 12/4/19   Janette Resendiz NP   senna-docusate 8.6-50 mg (PERICOLACE) 8.6-50 mg per tablet Take 2 tablets by mouth 2 (two) times daily.  Patient not taking: Reported on 1/14/2020 12/3/19   Janette Resendiz NP        Past medical history, surgical history, and family medical history reviewed and updated as appropriate.    Medications and allergies reviewed.     Objective:          Vitals:    04/30/20 1313   BP: 134/70   Pulse: 88   SpO2: 99%   Weight: 79.4 kg (175 lb 0.7 oz)   Height: 5' 3" (1.6 m)     Body mass index is 31.01 kg/m².  Physical Exam   Constitutional: She is oriented to person, place, and time. She appears well-developed and well-nourished. No distress.   Eyes: EOM are normal.   Cardiovascular: Normal rate, regular rhythm and normal heart sounds.   No murmur heard.  Pulmonary/Chest: Effort normal and breath sounds " normal. No respiratory distress. She has no wheezes.   Abdominal: Soft. Bowel sounds are normal. There is no tenderness.   Neurological: She is alert and oriented to person, place, and time.   Psychiatric: She has a normal mood and affect. Her behavior is normal.   Nursing note and vitals reviewed.      Lab Results   Component Value Date    WBC 12.62 12/02/2019    HGB 9.3 (L) 12/02/2019    HCT 30.2 (L) 12/02/2019     (H) 12/02/2019    ALT 16 11/11/2019    AST 14 11/11/2019     12/02/2019    K 4.4 12/02/2019     12/02/2019    CREATININE 0.7 12/02/2019    BUN 15 12/02/2019    CO2 31 (H) 12/02/2019    TSH 2.070 11/13/2019    INR 1.0 11/11/2019    HGBA1C 6.8 (H) 12/02/2019       Assessment:       1. Abdominal pressure    2. Abnormal urine odor    3. Constipation, unspecified constipation type    4. Annual physical exam    5. Type 2 diabetes mellitus with diabetic neuropathy, without long-term current use of insulin    6. Essential hypertension    7. Encounter for long-term (current) use of other medications    8. Hypothyroidism due to acquired atrophy of thyroid        Plan:   Lala was seen today for abdominal pain and constipation.    Diagnoses and all orders for this visit:    ua today, f/u results. Constipation rec of miralax, likely culprit of pain, getting better since previous bm.     Labs prior to f/u appt, wellness checkup.    Abdominal pressure  -     Urinalysis, Reflex to Urine Culture Urine, Clean Catch    Abnormal urine odor  -     Urinalysis, Reflex to Urine Culture Urine, Clean Catch    Constipation, unspecified constipation type  -     Urinalysis, Reflex to Urine Culture Urine, Clean Catch      Type 2 diabetes mellitus with diabetic neuropathy, without long-term current use of insulin  -     Lipid panel; Future  -     Hemoglobin A1c; Future    Essential hypertension  -     Comprehensive metabolic panel; Future    Encounter for long-term (current) use of other medications  -      Comprehensive metabolic panel; Future  -     CBC auto differential; Future  -     Lipid panel; Future  -     Hemoglobin A1c; Future  -     TSH; Future    Hypothyroidism due to acquired atrophy of thyroid  -     TSH; Future      Follow up in about 2 months (around 6/30/2020).    Edward Hernandez MD  Family Medicine  [facility]  4/30/2020

## 2020-05-01 ENCOUNTER — TELEPHONE (OUTPATIENT)
Dept: INTERNAL MEDICINE | Facility: CLINIC | Age: 60
End: 2020-05-01

## 2020-05-01 DIAGNOSIS — R82.71 BACTERIURIA: Primary | ICD-10-CM

## 2020-05-01 RX ORDER — NITROFURANTOIN 25; 75 MG/1; MG/1
100 CAPSULE ORAL 2 TIMES DAILY
Qty: 14 CAPSULE | Refills: 0 | Status: SHIPPED | OUTPATIENT
Start: 2020-05-01 | End: 2020-06-30

## 2020-05-01 NOTE — TELEPHONE ENCOUNTER
Spoke with patient about results over phone. Patient voiced understanding.     Starting empiric abx therapy. F/u culture results, adjust as needed.

## 2020-05-02 LAB — BACTERIA UR CULT: ABNORMAL

## 2020-05-04 ENCOUNTER — TELEPHONE (OUTPATIENT)
Dept: INTERNAL MEDICINE | Facility: CLINIC | Age: 60
End: 2020-05-04

## 2020-05-04 RX ORDER — CIPROFLOXACIN 500 MG/1
500 TABLET ORAL EVERY 12 HOURS
Qty: 14 TABLET | Refills: 0 | Status: SHIPPED | OUTPATIENT
Start: 2020-05-04 | End: 2020-06-30

## 2020-05-04 NOTE — TELEPHONE ENCOUNTER
----- Message from Edward Hernandez MD sent at 5/4/2020  8:55 AM CDT -----  I tried to call patient earlier but no answer. Please reach out to discuss urine culture results. I had sent in an antibiotic for empiric treatment prior to having culture results finalized and this bacteria is resistant to that first antibiotic meaning I need to switch her to an antibiotic that will treat the specific bacteria in her urine, Klebsiella. I sent in new antibiotic to her pharmacy. Stop the first one, start the new one. Thanks.

## 2020-05-18 ENCOUNTER — PATIENT OUTREACH (OUTPATIENT)
Dept: ADMINISTRATIVE | Facility: OTHER | Age: 60
End: 2020-05-18

## 2020-05-18 NOTE — PROGRESS NOTES
Chart reviewed.   Immunizations: updated  Orders placed: n/a  Upcoming appts to satisfy JAMES topics: Hemoglobin A1c 6/31

## 2020-05-20 ENCOUNTER — OFFICE VISIT (OUTPATIENT)
Dept: ORTHOPEDICS | Facility: CLINIC | Age: 60
End: 2020-05-20
Payer: COMMERCIAL

## 2020-05-20 VITALS — WEIGHT: 178.69 LBS | BODY MASS INDEX: 31.66 KG/M2 | HEIGHT: 63 IN

## 2020-05-20 DIAGNOSIS — Z98.1 HISTORY OF FUSION OF CERVICAL SPINE: Primary | ICD-10-CM

## 2020-05-20 PROCEDURE — 3008F BODY MASS INDEX DOCD: CPT | Mod: CPTII,S$GLB,, | Performed by: ORTHOPAEDIC SURGERY

## 2020-05-20 PROCEDURE — 99213 OFFICE O/P EST LOW 20 MIN: CPT | Mod: S$GLB,,, | Performed by: ORTHOPAEDIC SURGERY

## 2020-05-20 PROCEDURE — 99999 PR PBB SHADOW E&M-EST. PATIENT-LVL II: CPT | Mod: PBBFAC,,, | Performed by: ORTHOPAEDIC SURGERY

## 2020-05-20 PROCEDURE — 99999 PR PBB SHADOW E&M-EST. PATIENT-LVL II: ICD-10-PCS | Mod: PBBFAC,,, | Performed by: ORTHOPAEDIC SURGERY

## 2020-05-20 PROCEDURE — 99213 PR OFFICE/OUTPT VISIT, EST, LEVL III, 20-29 MIN: ICD-10-PCS | Mod: S$GLB,,, | Performed by: ORTHOPAEDIC SURGERY

## 2020-05-20 PROCEDURE — 3008F PR BODY MASS INDEX (BMI) DOCUMENTED: ICD-10-PCS | Mod: CPTII,S$GLB,, | Performed by: ORTHOPAEDIC SURGERY

## 2020-05-20 RX ORDER — CELECOXIB 200 MG/1
200 CAPSULE ORAL 2 TIMES DAILY PRN
Qty: 60 CAPSULE | Refills: 1 | Status: SHIPPED | OUTPATIENT
Start: 2020-05-20 | End: 2021-10-12 | Stop reason: SDUPTHER

## 2020-05-28 NOTE — PROGRESS NOTES
Date of Surgery: 11/19/2019    Procedure: C3-6 posterior laminectomy and fusion for cervical spondylitic myelopathy with acute progression    History: Lala Saxena is seen today for follow-up following the above listed procedure.  She has minimal pain    Exam:  Incision is healedl.   There is no sign of infection. Neuro exam is stable. No signs of DVT.    Radiographs: imaging today shows hardware in place, no evidence of failure.     Assessment/Plan:  6 months post op.  Continue to progress activities, follow up in 1 year.    I spent 15 minutes with the patient of which greater than 1/2 the time was devoted to counciling the patient regarding treatment options.

## 2020-06-18 ENCOUNTER — PATIENT OUTREACH (OUTPATIENT)
Dept: ADMINISTRATIVE | Facility: HOSPITAL | Age: 60
End: 2020-06-18

## 2020-06-18 NOTE — PROGRESS NOTES
VM left of need to call our office to schedule her PAP, Eye Exam,and  MMG. Labs scheduled. Made aware of Vaccines due Fit Kit to be dispensed at PCP visit.

## 2020-06-24 ENCOUNTER — LAB VISIT (OUTPATIENT)
Dept: LAB | Facility: HOSPITAL | Age: 60
End: 2020-06-24
Attending: FAMILY MEDICINE
Payer: MEDICARE

## 2020-06-24 DIAGNOSIS — E11.40 TYPE 2 DIABETES MELLITUS WITH DIABETIC NEUROPATHY, WITHOUT LONG-TERM CURRENT USE OF INSULIN: ICD-10-CM

## 2020-06-24 DIAGNOSIS — I10 ESSENTIAL HYPERTENSION: ICD-10-CM

## 2020-06-24 DIAGNOSIS — Z00.00 ANNUAL PHYSICAL EXAM: ICD-10-CM

## 2020-06-24 DIAGNOSIS — E03.4 HYPOTHYROIDISM DUE TO ACQUIRED ATROPHY OF THYROID: ICD-10-CM

## 2020-06-24 DIAGNOSIS — Z79.899 ENCOUNTER FOR LONG-TERM (CURRENT) USE OF OTHER MEDICATIONS: ICD-10-CM

## 2020-06-24 LAB
ALBUMIN SERPL BCP-MCNC: 3.7 G/DL (ref 3.5–5.2)
ALP SERPL-CCNC: 86 U/L (ref 55–135)
ALT SERPL W/O P-5'-P-CCNC: 16 U/L (ref 10–44)
ANION GAP SERPL CALC-SCNC: 9 MMOL/L (ref 8–16)
AST SERPL-CCNC: 15 U/L (ref 10–40)
BASOPHILS # BLD AUTO: 0.05 K/UL (ref 0–0.2)
BASOPHILS NFR BLD: 0.4 % (ref 0–1.9)
BILIRUB SERPL-MCNC: 0.3 MG/DL (ref 0.1–1)
BUN SERPL-MCNC: 18 MG/DL (ref 8–23)
CALCIUM SERPL-MCNC: 9.5 MG/DL (ref 8.7–10.5)
CHLORIDE SERPL-SCNC: 105 MMOL/L (ref 95–110)
CHOLEST SERPL-MCNC: 190 MG/DL (ref 120–199)
CHOLEST/HDLC SERPL: 5 {RATIO} (ref 2–5)
CO2 SERPL-SCNC: 24 MMOL/L (ref 23–29)
CREAT SERPL-MCNC: 0.8 MG/DL (ref 0.5–1.4)
DIFFERENTIAL METHOD: ABNORMAL
EOSINOPHIL # BLD AUTO: 0.3 K/UL (ref 0–0.5)
EOSINOPHIL NFR BLD: 2.7 % (ref 0–8)
ERYTHROCYTE [DISTWIDTH] IN BLOOD BY AUTOMATED COUNT: 13.2 % (ref 11.5–14.5)
EST. GFR  (AFRICAN AMERICAN): >60 ML/MIN/1.73 M^2
EST. GFR  (NON AFRICAN AMERICAN): >60 ML/MIN/1.73 M^2
ESTIMATED AVG GLUCOSE: 126 MG/DL (ref 68–131)
GLUCOSE SERPL-MCNC: 119 MG/DL (ref 70–110)
HBA1C MFR BLD HPLC: 6 % (ref 4–5.6)
HCT VFR BLD AUTO: 29.5 % (ref 37–48.5)
HDLC SERPL-MCNC: 38 MG/DL (ref 40–75)
HDLC SERPL: 20 % (ref 20–50)
HGB BLD-MCNC: 9 G/DL (ref 12–16)
IMM GRANULOCYTES # BLD AUTO: 0.03 K/UL (ref 0–0.04)
IMM GRANULOCYTES NFR BLD AUTO: 0.2 % (ref 0–0.5)
LDLC SERPL CALC-MCNC: 138.8 MG/DL (ref 63–159)
LYMPHOCYTES # BLD AUTO: 2.8 K/UL (ref 1–4.8)
LYMPHOCYTES NFR BLD: 22.4 % (ref 18–48)
MCH RBC QN AUTO: 28.1 PG (ref 27–31)
MCHC RBC AUTO-ENTMCNC: 30.5 G/DL (ref 32–36)
MCV RBC AUTO: 92 FL (ref 82–98)
MONOCYTES # BLD AUTO: 0.8 K/UL (ref 0.3–1)
MONOCYTES NFR BLD: 6.5 % (ref 4–15)
NEUTROPHILS # BLD AUTO: 8.3 K/UL (ref 1.8–7.7)
NEUTROPHILS NFR BLD: 67.8 % (ref 38–73)
NONHDLC SERPL-MCNC: 152 MG/DL
NRBC BLD-RTO: 0 /100 WBC
PLATELET # BLD AUTO: 389 K/UL (ref 150–350)
PMV BLD AUTO: 10.5 FL (ref 9.2–12.9)
POTASSIUM SERPL-SCNC: 4.1 MMOL/L (ref 3.5–5.1)
PROT SERPL-MCNC: 7.4 G/DL (ref 6–8.4)
RBC # BLD AUTO: 3.2 M/UL (ref 4–5.4)
SODIUM SERPL-SCNC: 138 MMOL/L (ref 136–145)
TRIGL SERPL-MCNC: 66 MG/DL (ref 30–150)
TSH SERPL DL<=0.005 MIU/L-ACNC: 3.74 UIU/ML (ref 0.4–4)
WBC # BLD AUTO: 12.29 K/UL (ref 3.9–12.7)

## 2020-06-24 PROCEDURE — 85025 COMPLETE CBC W/AUTO DIFF WBC: CPT

## 2020-06-24 PROCEDURE — 80053 COMPREHEN METABOLIC PANEL: CPT

## 2020-06-24 PROCEDURE — 80061 LIPID PANEL: CPT

## 2020-06-24 PROCEDURE — 83036 HEMOGLOBIN GLYCOSYLATED A1C: CPT

## 2020-06-24 PROCEDURE — 84443 ASSAY THYROID STIM HORMONE: CPT

## 2020-06-24 PROCEDURE — 36415 COLL VENOUS BLD VENIPUNCTURE: CPT

## 2020-06-30 ENCOUNTER — OFFICE VISIT (OUTPATIENT)
Dept: INTERNAL MEDICINE | Facility: CLINIC | Age: 60
End: 2020-06-30
Payer: MEDICARE

## 2020-06-30 ENCOUNTER — DOCUMENTATION ONLY (OUTPATIENT)
Dept: INTERNAL MEDICINE | Facility: CLINIC | Age: 60
End: 2020-06-30

## 2020-06-30 VITALS
BODY MASS INDEX: 32.46 KG/M2 | OXYGEN SATURATION: 98 % | HEART RATE: 91 BPM | HEIGHT: 63 IN | DIASTOLIC BLOOD PRESSURE: 74 MMHG | SYSTOLIC BLOOD PRESSURE: 146 MMHG | WEIGHT: 183.19 LBS

## 2020-06-30 DIAGNOSIS — Z01.419 WELL WOMAN EXAM: ICD-10-CM

## 2020-06-30 DIAGNOSIS — E78.2 MIXED HYPERLIPIDEMIA: ICD-10-CM

## 2020-06-30 DIAGNOSIS — E11.40 TYPE 2 DIABETES MELLITUS WITH DIABETIC NEUROPATHY, WITHOUT LONG-TERM CURRENT USE OF INSULIN: ICD-10-CM

## 2020-06-30 DIAGNOSIS — I10 ESSENTIAL HYPERTENSION: ICD-10-CM

## 2020-06-30 DIAGNOSIS — R30.0 DYSURIA: Primary | ICD-10-CM

## 2020-06-30 LAB
BILIRUB UR QL STRIP: NEGATIVE
CLARITY UR REFRACT.AUTO: CLEAR
COLOR UR AUTO: YELLOW
GLUCOSE UR QL STRIP: NEGATIVE
HGB UR QL STRIP: NEGATIVE
KETONES UR QL STRIP: NEGATIVE
LEUKOCYTE ESTERASE UR QL STRIP: NEGATIVE
NITRITE UR QL STRIP: NEGATIVE
PH UR STRIP: 5 [PH] (ref 5–8)
PROT UR QL STRIP: NEGATIVE
SP GR UR STRIP: 1.01 (ref 1–1.03)
URN SPEC COLLECT METH UR: NORMAL

## 2020-06-30 PROCEDURE — 99214 PR OFFICE/OUTPT VISIT, EST, LEVL IV, 30-39 MIN: ICD-10-PCS | Mod: S$GLB,,, | Performed by: FAMILY MEDICINE

## 2020-06-30 PROCEDURE — 99999 PR PBB SHADOW E&M-EST. PATIENT-LVL V: ICD-10-PCS | Mod: PBBFAC,,, | Performed by: FAMILY MEDICINE

## 2020-06-30 PROCEDURE — 99214 OFFICE O/P EST MOD 30 MIN: CPT | Mod: S$GLB,,, | Performed by: FAMILY MEDICINE

## 2020-06-30 PROCEDURE — 99999 PR PBB SHADOW E&M-EST. PATIENT-LVL V: CPT | Mod: PBBFAC,,, | Performed by: FAMILY MEDICINE

## 2020-06-30 PROCEDURE — 81003 URINALYSIS AUTO W/O SCOPE: CPT

## 2020-06-30 NOTE — PROGRESS NOTES
Subjective:       Patient ID: Lala Saxena is a 60 y.o. female.    Chief Complaint: Follow-up    HPI    60yoF with PMHx HTN, hypothyroid, DM for follow up.     C/o foul odor with urine over last few days. Similar complaint lov. Will check urine today.    #Cards: HTN, HLD  - current reg: Norvasc 10 qd; Losartan 50 qd, Lipitor 10 qd     #Endo: DM (last A1c 6/2020 = 6.0)  - current reg: Metformin 1000 bid, Glimepiride 4 qd, Lipitor 10 qd  - ophtho: last seen 2019  - foot exam 6/2020     #Ortho: symptomatic cervical myelopathy; s/p C3-6 laminectomy and fusion on 11/19/19  - est w/ Dr. Cruz, lov 5/2020 --> f/u 1yr     #Neurology -- LOV 11/2019 Dr. Rao    #Obgyn:  - wants to establish    #Normocytic anemia    Review of Systems   Constitutional: Negative for appetite change, fatigue and fever.   HENT: Negative for congestion.    Respiratory: Negative for cough and shortness of breath.    Cardiovascular: Negative for chest pain and palpitations.   Gastrointestinal: Negative for abdominal pain, constipation, diarrhea, nausea and vomiting.   Genitourinary: Positive for dysuria.   Musculoskeletal: Negative for back pain.   Skin: Negative for rash.   Neurological: Negative for weakness, numbness and headaches.         Past Medical History:   Diagnosis Date    Acute blood loss anemia 11/27/2019    Ataxia 11/5/2019    Diabetes mellitus, type 2     Hyperlipidemia     Hypertension     Left hand weakness 11/5/2019    Thyroid disease         Prior to Admission medications    Medication Sig Start Date End Date Taking? Authorizing Provider   amLODIPine (NORVASC) 10 MG tablet Take 1 tablet (10 mg total) by mouth once daily. 2/6/20  Yes Edward Hernandez MD   atorvastatin (LIPITOR) 10 MG tablet Take 10 mg by mouth once daily.   Yes Historical Provider, MD   celecoxib (CELEBREX) 200 MG capsule Take 1 capsule (200 mg total) by mouth 2 (two) times daily as needed for Pain. 5/20/20  Yes Miguel Cruz MD   ciprofloxacin  "HCl (CIPRO) 500 MG tablet Take 1 tablet (500 mg total) by mouth every 12 (twelve) hours. 5/4/20  Yes Edward Hernandez MD   gabapentin (NEURONTIN) 100 MG capsule  3/19/20  Yes Jocelyn Flores MD   glimepiride (AMARYL) 4 MG tablet Take 1 tablet (4 mg total) by mouth daily with breakfast. 3/20/20  Yes Edward Hernandez MD   levothyroxine (SYNTHROID) 50 MCG tablet Take 1 tablet (50 mcg total) by mouth once daily. 3/20/20  Yes Edward Hernandez MD   losartan (COZAAR) 50 MG tablet Take 1 tablet (50 mg total) by mouth once daily. 3/20/20 3/20/21 Yes Edward Hernandez MD   metFORMIN (GLUCOPHAGE) 1000 MG tablet Take 1 tablet (1,000 mg total) by mouth 2 (two) times daily with meals. 3/20/20  Yes Edward Hernandez MD   ondansetron (ZOFRAN-ODT) 4 MG TbDL Take 1 tablet (4 mg total) by mouth every 8 (eight) hours as needed. 1/14/20  Yes Edward Hernandez MD   oxyCODONE (ROXICODONE) 5 MG immediate release tablet Take 1 tablet (5 mg total) by mouth every 6 (six) hours as needed for Pain. 12/3/19  Yes Janette Resendiz NP   polyethylene glycol (GLYCOLAX) 17 gram PwPk Take 17 g by mouth once daily. 12/4/19  Yes Janette Resendiz NP   senna-docusate 8.6-50 mg (PERICOLACE) 8.6-50 mg per tablet Take 2 tablets by mouth 2 (two) times daily. 12/3/19  Yes Janette Resendiz NP   nitrofurantoin, macrocrystal-monohydrate, (MACROBID) 100 MG capsule Take 1 capsule (100 mg total) by mouth 2 (two) times daily.  Patient not taking: Reported on 6/30/2020 5/1/20   Edward Hernandez MD        Past medical history, surgical history, and family medical history reviewed and updated as appropriate.    Medications and allergies reviewed.     Objective:          Vitals:    06/30/20 1032   BP: (!) 146/74   BP Location: Right arm   Patient Position: Sitting   BP Method: Medium (Manual)   Pulse: 91   SpO2: 98%   Weight: 83.1 kg (183 lb 3.2 oz)   Height: 5' 3" (1.6 m)     Body mass index is 32.45 kg/m².  Physical Exam  Vitals signs and nursing note reviewed. "   Constitutional:       General: She is not in acute distress.     Appearance: Normal appearance. She is well-developed.   Eyes:      Extraocular Movements: Extraocular movements intact.   Cardiovascular:      Rate and Rhythm: Normal rate and regular rhythm.      Pulses: Normal pulses.           Dorsalis pedis pulses are 2+ on the right side and 2+ on the left side.        Posterior tibial pulses are 2+ on the right side and 2+ on the left side.      Heart sounds: Normal heart sounds. No murmur.   Pulmonary:      Effort: Pulmonary effort is normal. No respiratory distress.      Breath sounds: Normal breath sounds. No wheezing.   Abdominal:      General: Bowel sounds are normal. There is no distension.      Palpations: Abdomen is soft.      Tenderness: There is no abdominal tenderness.   Feet:      Right foot:      Protective Sensation: 5 sites tested. 5 sites sensed.      Skin integrity: No ulcer, skin breakdown, erythema or dry skin.      Left foot:      Protective Sensation: 5 sites tested. 5 sites sensed.      Skin integrity: No ulcer, skin breakdown, erythema or dry skin.   Neurological:      Mental Status: She is alert and oriented to person, place, and time.   Psychiatric:         Behavior: Behavior normal.         Lab Results   Component Value Date    WBC 12.29 06/24/2020    HGB 9.0 (L) 06/24/2020    HCT 29.5 (L) 06/24/2020     (H) 06/24/2020    CHOL 190 06/24/2020    TRIG 66 06/24/2020    HDL 38 (L) 06/24/2020    ALT 16 06/24/2020    AST 15 06/24/2020     06/24/2020    K 4.1 06/24/2020     06/24/2020    CREATININE 0.8 06/24/2020    BUN 18 06/24/2020    CO2 24 06/24/2020    TSH 3.737 06/24/2020    INR 1.0 11/11/2019    HGBA1C 6.0 (H) 06/24/2020       Assessment:       1. Dysuria    2. Well woman exam    3. Type 2 diabetes mellitus with diabetic neuropathy, without long-term current use of insulin    4. Essential hypertension    5. Mixed hyperlipidemia        Plan:   Lala was seen today for  follow-up.    Diagnoses and all orders for this visit:    ua today and f/u results    Stable conditions, reviewed recent labs today. Cont current med regimen.     Hm reviewed today, fitkit mgram eye screening ordered. Cont to update at future visits inclu vacc. obgyn referral.    Dysuria  -     Urinalysis, Reflex to Urine Culture Urine, Clean Catch    Well woman exam  -     Ambulatory referral/consult to Obstetrics / Gynecology; Future    Type 2 diabetes mellitus with diabetic neuropathy, without long-term current use of insulin  -     Ambulatory referral/consult to Ophthalmology; Future    Essential hypertension    Mixed hyperlipidemia        Health maintenance reviewed with patient.     Follow up in about 6 months (around 12/30/2020).    Edward Hernandez MD  Family Medicine  [facility]  6/30/2020

## 2020-07-01 ENCOUNTER — TELEPHONE (OUTPATIENT)
Dept: INTERNAL MEDICINE | Facility: CLINIC | Age: 60
End: 2020-07-01

## 2020-07-01 NOTE — TELEPHONE ENCOUNTER
----- Message from Edward Hernandez MD sent at 7/1/2020  7:57 AM CDT -----  Please reach out to patient with urine results. Negative and normal.

## 2020-07-01 NOTE — TELEPHONE ENCOUNTER
----- Message from Milagro Ambriz sent at 7/1/2020 10:30 AM CDT -----  Patient is returning a phone call.  Who left a message for the patient: Ai  Does patient know what this is regarding:  No  Comments:

## 2020-10-14 ENCOUNTER — TELEPHONE (OUTPATIENT)
Dept: INTERNAL MEDICINE | Facility: CLINIC | Age: 60
End: 2020-10-14

## 2020-10-15 ENCOUNTER — TELEPHONE (OUTPATIENT)
Dept: INTERNAL MEDICINE | Facility: CLINIC | Age: 60
End: 2020-10-15

## 2020-10-15 DIAGNOSIS — Z12.11 ENCOUNTER FOR COLORECTAL CANCER SCREENING: Primary | ICD-10-CM

## 2020-10-15 DIAGNOSIS — Z12.12 ENCOUNTER FOR COLORECTAL CANCER SCREENING: Primary | ICD-10-CM

## 2020-10-15 NOTE — TELEPHONE ENCOUNTER
----- Message from Trish Gu sent at 10/15/2020  2:37 PM CDT -----  Regarding: self   Patient called in regarding getting a colonoscopy order in  patient states she want in clinic order not home collect please advise

## 2020-10-26 DIAGNOSIS — Z01.818 PRE-OP TESTING: ICD-10-CM

## 2020-10-26 DIAGNOSIS — Z12.11 SPECIAL SCREENING FOR MALIGNANT NEOPLASMS, COLON: Primary | ICD-10-CM

## 2020-10-26 RX ORDER — POLYETHYLENE GLYCOL 3350, SODIUM SULFATE ANHYDROUS, SODIUM BICARBONATE, SODIUM CHLORIDE, POTASSIUM CHLORIDE 236; 22.74; 6.74; 5.86; 2.97 G/4L; G/4L; G/4L; G/4L; G/4L
4 POWDER, FOR SOLUTION ORAL ONCE
Qty: 4000 ML | Refills: 0 | Status: SHIPPED | OUTPATIENT
Start: 2020-10-26 | End: 2020-10-26

## 2020-11-02 ENCOUNTER — PATIENT OUTREACH (OUTPATIENT)
Dept: ADMINISTRATIVE | Facility: OTHER | Age: 60
End: 2020-11-02

## 2020-11-02 NOTE — PROGRESS NOTES
Care Everywhere: updated  Immunization: updated(delay in links)   Health Maintenance: updated  Media Review: review for outside mammogram report  Legacy Review:   Order placed:   Upcoming appts: colonoscopy 11/12, optometry 12/2

## 2020-11-03 ENCOUNTER — OFFICE VISIT (OUTPATIENT)
Dept: OBSTETRICS AND GYNECOLOGY | Facility: CLINIC | Age: 60
End: 2020-11-03
Payer: COMMERCIAL

## 2020-11-03 VITALS
SYSTOLIC BLOOD PRESSURE: 166 MMHG | WEIGHT: 187.63 LBS | DIASTOLIC BLOOD PRESSURE: 86 MMHG | HEIGHT: 63 IN | BODY MASS INDEX: 33.25 KG/M2

## 2020-11-03 DIAGNOSIS — Z01.419 WELL WOMAN EXAM: ICD-10-CM

## 2020-11-03 DIAGNOSIS — Z12.39 ENCOUNTER FOR SCREENING FOR MALIGNANT NEOPLASM OF BREAST, UNSPECIFIED SCREENING MODALITY: ICD-10-CM

## 2020-11-03 DIAGNOSIS — Z01.419 ENCOUNTER FOR GYNECOLOGICAL EXAMINATION: Primary | ICD-10-CM

## 2020-11-03 PROCEDURE — 99999 PR PBB SHADOW E&M-EST. PATIENT-LVL III: CPT | Mod: PBBFAC,,, | Performed by: OBSTETRICS & GYNECOLOGY

## 2020-11-03 PROCEDURE — 3008F PR BODY MASS INDEX (BMI) DOCUMENTED: ICD-10-PCS | Mod: CPTII,S$GLB,, | Performed by: OBSTETRICS & GYNECOLOGY

## 2020-11-03 PROCEDURE — 87624 HPV HI-RISK TYP POOLED RSLT: CPT

## 2020-11-03 PROCEDURE — 3008F BODY MASS INDEX DOCD: CPT | Mod: CPTII,S$GLB,, | Performed by: OBSTETRICS & GYNECOLOGY

## 2020-11-03 PROCEDURE — 99386 PREV VISIT NEW AGE 40-64: CPT | Mod: S$GLB,,, | Performed by: OBSTETRICS & GYNECOLOGY

## 2020-11-03 PROCEDURE — 3077F SYST BP >= 140 MM HG: CPT | Mod: CPTII,S$GLB,, | Performed by: OBSTETRICS & GYNECOLOGY

## 2020-11-03 PROCEDURE — 3079F PR MOST RECENT DIASTOLIC BLOOD PRESSURE 80-89 MM HG: ICD-10-PCS | Mod: CPTII,S$GLB,, | Performed by: OBSTETRICS & GYNECOLOGY

## 2020-11-03 PROCEDURE — 88175 CYTOPATH C/V AUTO FLUID REDO: CPT

## 2020-11-03 PROCEDURE — 3077F PR MOST RECENT SYSTOLIC BLOOD PRESSURE >= 140 MM HG: ICD-10-PCS | Mod: CPTII,S$GLB,, | Performed by: OBSTETRICS & GYNECOLOGY

## 2020-11-03 PROCEDURE — 99999 PR PBB SHADOW E&M-EST. PATIENT-LVL III: ICD-10-PCS | Mod: PBBFAC,,, | Performed by: OBSTETRICS & GYNECOLOGY

## 2020-11-03 PROCEDURE — 99386 PR PREVENTIVE VISIT,NEW,40-64: ICD-10-PCS | Mod: S$GLB,,, | Performed by: OBSTETRICS & GYNECOLOGY

## 2020-11-03 PROCEDURE — 3079F DIAST BP 80-89 MM HG: CPT | Mod: CPTII,S$GLB,, | Performed by: OBSTETRICS & GYNECOLOGY

## 2020-11-03 NOTE — LETTER
November 3, 2020      Edward Hernandez MD  [address] [facility] [address]           [facility] [address]  [address] [address]  Phone: [phone]  Fax: [phone]          Patient: Lala Saxena   MR Number: [id number]   YOB: 1960   Date of Visit: 11/3/2020       Dear Dr. Edward Hernandez:    Thank you for referring Lala Saxena to me for evaluation. Attached you will find relevant portions of my assessment and plan of care.    If you have questions, please do not hesitate to call me. I look forward to following Lala Saxena along with you.    Sincerely,    Julie R. Jeansonne, MD    Enclosure  CC:  No Recipients    If you would like to receive this communication electronically, please contact [email or web address] or [phone] to request more information on [facility] access.    For providers and/or their staff who would like to refer a patient to [facility], please contact us through our one-stop-shop provider referral line, [facility], at [phone].    If you feel you have received this communication in error or would no longer like to receive these types of communications, please e-mail [email or web address]

## 2020-11-03 NOTE — PROGRESS NOTES
SUBJECTIVE:     Chief Complaint: Well Woman       History of Present Illness:  Annual Exam  Patient presents for annual exam.   She c/o nothing today. Extremely anxious about exam. Difficult to obtain history due to anxiety.  LMP:   She denies any vd, vb, dyspareunia, dysuria, depression, anxiety.  Last pap was in unsure and was neg per patient.  Birth Control:  since age 43yo    GYN screening history: denies  Mammogram history: neg per patient, due, unsure how long ago last one was  Colonoscopy history: getting scheduled  Dexa history: na    FH:   Breast cancer: none  Colon cancer: none  Ovarian cancer: none    Review of patient's allergies indicates:  No Known Allergies    Past Medical History:   Diagnosis Date    Acute blood loss anemia 2019    Ataxia 2019    Diabetes mellitus, type 2     Hyperlipidemia     Hypertension     Left hand weakness 2019    Thyroid disease      Past Surgical History:   Procedure Laterality Date    ABDOMINAL SURGERY       SECTION      FRACTURE SURGERY       OB History    No obstetric history on file.       History reviewed. No pertinent family history.  Social History     Tobacco Use    Smoking status: Never Smoker    Smokeless tobacco: Never Used   Substance Use Topics    Alcohol use: Not Currently    Drug use: Never       Current Outpatient Medications   Medication Sig    amLODIPine (NORVASC) 10 MG tablet Take 1 tablet (10 mg total) by mouth once daily.    atorvastatin (LIPITOR) 10 MG tablet Take 10 mg by mouth once daily.    celecoxib (CELEBREX) 200 MG capsule Take 1 capsule (200 mg total) by mouth 2 (two) times daily as needed for Pain.    gabapentin (NEURONTIN) 100 MG capsule     glimepiride (AMARYL) 4 MG tablet Take 1 tablet (4 mg total) by mouth daily with breakfast.    levothyroxine (SYNTHROID) 50 MCG tablet Take 1 tablet (50 mcg total) by mouth once daily.    losartan (COZAAR) 50 MG tablet Take 1 tablet by mouth once daily     metFORMIN (GLUCOPHAGE) 1000 MG tablet Take 1 tablet (1,000 mg total) by mouth 2 (two) times daily with meals.    ondansetron (ZOFRAN-ODT) 4 MG TbDL Take 1 tablet (4 mg total) by mouth every 8 (eight) hours as needed.    polyethylene glycol (GLYCOLAX) 17 gram PwPk Take 17 g by mouth once daily.    senna-docusate 8.6-50 mg (PERICOLACE) 8.6-50 mg per tablet Take 2 tablets by mouth 2 (two) times daily.    oxyCODONE (ROXICODONE) 5 MG immediate release tablet Take 1 tablet (5 mg total) by mouth every 6 (six) hours as needed for Pain. (Patient not taking: Reported on 11/3/2020)     No current facility-administered medications for this visit.        Review of Systems:  GENERAL: No fever, chills, fatigability or weight loss.  CARDIOVASCULAR: No chest pain. No palpitations.  RESPIRATORY: No SOB, no wheezing.  BREAST: Denies pain. No lumps. No discharge.  VULVAR: No pain, no lesions and no itching.  VAGINAL: No relaxation, no itching, no discharge, no abnormal bleeding and no lesions.  ABDOMEN: No abdominal pain. Denies nausea. Denies vomiting. No diarrhea. No constipation  URINARY: No incontinence, no nocturia, no frequency and no dysuria.  NEUROLOGICAL: No headaches. No vision changes.       OBJECTIVE:     Vitals:    11/03/20 0915   BP: (!) 166/86       Physical Exam:  Gen: NAD, well developed, well-nourished  HEENT: Normocephalic, atraumatic  Eyes: EOM nl, conjuntivae normal  Neck: ROM normal, no thyromegaly  Respiratory: Effort normal   Abd: soft, nontender, no masses palpated  Breast: Normal bilaterally, no masses, lesions or tenderness. No nipple discharge on expression, no lymphadenopathy bilaterally.  SSE:  Vulva: no lesions or rashes  Cervix: No lesions noted, nonfriable, no vaginal discharge or vaginal bleeding noted. Vaginal atrophy noted.   BME:   Cervix: No CMT  Adnexa: nl bilaterally, no masses or fullness palpated  Uterus: normal, nonenlarged  Musculoskeletal: normal ROM  Neuro: alert, AAOx3  Skin: warm and  dry  Psych: mood/affect nl, behavior normal, judgement normal, thought content normal        ASSESSMENT:       ICD-10-CM ICD-9-CM    1. Encounter for gynecological examination  Z01.419 V72.31 Liquid-Based Pap Smear, Screening      HPV High Risk Genotypes, PCR   2. Encounter for screening for malignant neoplasm of breast, unspecified screening modality  Z12.39 V76.10 Mammo Digital Screening Bilat   3. Well woman exam  Z01.419 V72.31 Ambulatory referral/consult to Obstetrics / Gynecology          Plan:      Lala was seen today for well woman.    Diagnoses and all orders for this visit:    Encounter for gynecological examination  -     Liquid-Based Pap Smear, Screening  -     HPV High Risk Genotypes, PCR    Encounter for screening for malignant neoplasm of breast, unspecified screening modality  -     Mammo Digital Screening Bilat; Future    Well woman exam  -     Ambulatory referral/consult to Obstetrics / Gynecology        Orders Placed This Encounter   Procedures    HPV High Risk Genotypes, PCR    Mammo Digital Screening Bilat       Follow up in one year for annual, or prn.    Julie R Jeansonne

## 2020-11-04 ENCOUNTER — IMMUNIZATION (OUTPATIENT)
Dept: PHARMACY | Facility: CLINIC | Age: 60
End: 2020-11-04
Payer: COMMERCIAL

## 2020-11-06 ENCOUNTER — TELEPHONE (OUTPATIENT)
Dept: GASTROENTEROLOGY | Facility: CLINIC | Age: 60
End: 2020-11-06

## 2020-11-06 NOTE — TELEPHONE ENCOUNTER
----- Message from Sigifredo Mills sent at 11/6/2020 11:24 AM CST -----  Contact: Pt. [phone]  The patient would like to speak to someone regarding an earlier time for her procedure. Please contact the patient to discuss further.

## 2020-11-09 ENCOUNTER — LAB VISIT (OUTPATIENT)
Dept: SURGERY | Facility: CLINIC | Age: 60
End: 2020-11-09
Payer: COMMERCIAL

## 2020-11-09 DIAGNOSIS — Z01.818 PRE-OP TESTING: ICD-10-CM

## 2020-11-09 LAB — SARS-COV-2 RNA RESP QL NAA+PROBE: NOT DETECTED

## 2020-11-09 PROCEDURE — U0003 INFECTIOUS AGENT DETECTION BY NUCLEIC ACID (DNA OR RNA); SEVERE ACUTE RESPIRATORY SYNDROME CORONAVIRUS 2 (SARS-COV-2) (CORONAVIRUS DISEASE [COVID-19]), AMPLIFIED PROBE TECHNIQUE, MAKING USE OF HIGH THROUGHPUT TECHNOLOGIES AS DESCRIBED BY CMS-2020-01-R: HCPCS

## 2020-11-12 ENCOUNTER — HOSPITAL ENCOUNTER (OUTPATIENT)
Facility: HOSPITAL | Age: 60
Discharge: HOME | End: 2020-11-12
Attending: STUDENT IN AN ORGANIZED HEALTH CARE EDUCATION/TRAINING PROGRAM | Admitting: STUDENT IN AN ORGANIZED HEALTH CARE EDUCATION/TRAINING PROGRAM
Payer: COMMERCIAL

## 2020-11-12 ENCOUNTER — ANESTHESIA EVENT (OUTPATIENT)
Dept: ENDOSCOPY | Facility: HOSPITAL | Age: 60
End: 2020-11-12
Payer: COMMERCIAL

## 2020-11-12 ENCOUNTER — ANESTHESIA (OUTPATIENT)
Dept: ENDOSCOPY | Facility: HOSPITAL | Age: 60
End: 2020-11-12
Payer: COMMERCIAL

## 2020-11-12 VITALS
WEIGHT: 180 LBS | TEMPERATURE: 98 F | HEIGHT: 64 IN | BODY MASS INDEX: 30.73 KG/M2 | SYSTOLIC BLOOD PRESSURE: 192 MMHG | HEART RATE: 82 BPM | OXYGEN SATURATION: 99 % | DIASTOLIC BLOOD PRESSURE: 82 MMHG | RESPIRATION RATE: 15 BRPM

## 2020-11-12 DIAGNOSIS — Z12.11 COLON CANCER SCREENING: Primary | ICD-10-CM

## 2020-11-12 LAB
HPV HR 12 DNA SPEC QL NAA+PROBE: NEGATIVE
HPV16 AG SPEC QL: NEGATIVE
HPV18 DNA SPEC QL NAA+PROBE: NEGATIVE
POCT GLUCOSE: 151 MG/DL (ref 70–110)

## 2020-11-12 PROCEDURE — 37000009 HC ANESTHESIA EA ADD 15 MINS: Performed by: STUDENT IN AN ORGANIZED HEALTH CARE EDUCATION/TRAINING PROGRAM

## 2020-11-12 PROCEDURE — 82962 GLUCOSE BLOOD TEST: CPT | Performed by: STUDENT IN AN ORGANIZED HEALTH CARE EDUCATION/TRAINING PROGRAM

## 2020-11-12 PROCEDURE — 37000008 HC ANESTHESIA 1ST 15 MINUTES: Performed by: STUDENT IN AN ORGANIZED HEALTH CARE EDUCATION/TRAINING PROGRAM

## 2020-11-12 PROCEDURE — E9220 PRA ENDO ANESTHESIA: HCPCS | Mod: ,,, | Performed by: NURSE ANESTHETIST, CERTIFIED REGISTERED

## 2020-11-12 PROCEDURE — 25000003 PHARM REV CODE 250: Performed by: STUDENT IN AN ORGANIZED HEALTH CARE EDUCATION/TRAINING PROGRAM

## 2020-11-12 PROCEDURE — E9220 PRA ENDO ANESTHESIA: ICD-10-PCS | Mod: ,,, | Performed by: NURSE ANESTHETIST, CERTIFIED REGISTERED

## 2020-11-12 PROCEDURE — 25000003 PHARM REV CODE 250: Performed by: ANESTHESIOLOGY

## 2020-11-12 PROCEDURE — G0121 COLON CA SCRN NOT HI RSK IND: HCPCS | Performed by: STUDENT IN AN ORGANIZED HEALTH CARE EDUCATION/TRAINING PROGRAM

## 2020-11-12 PROCEDURE — G0121 COLON CA SCRN NOT HI RSK IND: HCPCS | Mod: ,,, | Performed by: STUDENT IN AN ORGANIZED HEALTH CARE EDUCATION/TRAINING PROGRAM

## 2020-11-12 PROCEDURE — G0121 COLON CA SCRN NOT HI RSK IND: ICD-10-PCS | Mod: ,,, | Performed by: STUDENT IN AN ORGANIZED HEALTH CARE EDUCATION/TRAINING PROGRAM

## 2020-11-12 PROCEDURE — 63600175 PHARM REV CODE 636 W HCPCS: Performed by: NURSE ANESTHETIST, CERTIFIED REGISTERED

## 2020-11-12 RX ORDER — SODIUM CHLORIDE 9 MG/ML
INJECTION, SOLUTION INTRAVENOUS CONTINUOUS
Status: DISCONTINUED | OUTPATIENT
Start: 2020-11-12 | End: 2020-11-12 | Stop reason: HOSPADM

## 2020-11-12 RX ORDER — PROPOFOL 10 MG/ML
VIAL (ML) INTRAVENOUS
Status: DISCONTINUED | OUTPATIENT
Start: 2020-11-12 | End: 2020-11-12

## 2020-11-12 RX ORDER — LIDOCAINE HCL/PF 100 MG/5ML
SYRINGE (ML) INTRAVENOUS
Status: DISCONTINUED | OUTPATIENT
Start: 2020-11-12 | End: 2020-11-12

## 2020-11-12 RX ORDER — LABETALOL HYDROCHLORIDE 5 MG/ML
10 INJECTION, SOLUTION INTRAVENOUS ONCE
Status: COMPLETED | OUTPATIENT
Start: 2020-11-12 | End: 2020-11-12

## 2020-11-12 RX ORDER — PROPOFOL 10 MG/ML
VIAL (ML) INTRAVENOUS CONTINUOUS PRN
Status: DISCONTINUED | OUTPATIENT
Start: 2020-11-12 | End: 2020-11-12

## 2020-11-12 RX ADMIN — Medication 100 MG: at 09:11

## 2020-11-12 RX ADMIN — PROPOFOL 200 MCG/KG/MIN: 10 INJECTION, EMULSION INTRAVENOUS at 09:11

## 2020-11-12 RX ADMIN — LABETALOL HYDROCHLORIDE 10 MG: 5 INJECTION INTRAVENOUS at 11:11

## 2020-11-12 RX ADMIN — PROPOFOL 80 MG: 10 INJECTION, EMULSION INTRAVENOUS at 09:11

## 2020-11-12 RX ADMIN — SODIUM CHLORIDE: 0.9 INJECTION, SOLUTION INTRAVENOUS at 09:11

## 2020-11-12 NOTE — ANESTHESIA PREPROCEDURE EVALUATION
2020  Lala Saxena is a 60 y.o., female.  Past Medical History:   Diagnosis Date    Acute blood loss anemia 2019    Ataxia 2019    Diabetes mellitus, type 2     Hyperlipidemia     Hypertension     Left hand weakness 2019    Thyroid disease      Past Surgical History:   Procedure Laterality Date    ABDOMINAL SURGERY       SECTION      FRACTURE SURGERY       Patient Active Problem List   Diagnosis    Type 2 diabetes mellitus, without long-term current use of insulin    Essential hypertension    Cervical myelopathy    Mixed hyperlipidemia    Fusion of spine of cervical region    Debility    Hypothyroidism due to acquired atrophy of thyroid    Colon cancer screening           Anesthesia Evaluation    I have reviewed the Patient Summary Reports.    I have reviewed the Nursing Notes.    I have reviewed the Medications.     Review of Systems  Anesthesia Hx:  No problems with previous Anesthesia  History of prior surgery of interest to airway management or planning:   Hematology/Oncology:  Hematology Normal   Oncology Normal     EENT/Dental:EENT/Dental Normal   Cardiovascular:  Cardiovascular Normal Hypertension   Hypertension    Pulmonary:  Pulmonary Normal    Renal/:  Renal/ Normal     Hepatic/GI:  Hepatic/GI Normal    Musculoskeletal:  Musculoskeletal Normal    Neurological:  Neurology Normal    Endocrine:   Diabetes, well controlled  Diabetes    Dermatological:  Skin Normal    Psych:  Psychiatric Normal           Physical Exam  General:  Well nourished    Airway/Jaw/Neck:  Airway Findings: Mouth Opening: Normal Tongue: Normal  General Airway Assessment: Adult  Mallampati: II  TM Distance: Normal, at least 6 cm     Eyes/Ears/Nose:  Eyes/Ears/Nose Findings:          Mental Status:  Mental Status Findings:  Alert and Oriented, Cooperative          Anesthesia Plan  Type of Anesthesia, risks & benefits discussed:  Anesthesia Type:  general  Patient's Preference:   Intra-op Monitoring Plan: standard ASA monitors  Intra-op Monitoring Plan Comments:   Post Op Pain Control Plan:   Post Op Pain Control Plan Comments:   Induction:    Beta Blocker:  Patient is not currently on a Beta-Blocker (No further documentation required).       Informed Consent: Patient understands risks and agrees with Anesthesia plan.  Questions answered.   ASA Score: 2     Day of Surgery Review of History & Physical: I have interviewed and examined the patient. I have reviewed the patient's H&P dated:  There are no significant changes.  H&P update referred to the provider.         Ready For Surgery From Anesthesia Perspective.

## 2020-11-12 NOTE — [employer] PATIENT INSTRUCTIONS
Discharge Summary/Instructions after an Endoscopic Procedure  Patient Name: Lala Saxena  Patient MRN: [id number]  Patient YOB: 1960 Thursday, November 12, 2020  Dennis Santos MD  RESTRICTIONS:  During your procedure today, you received medications for sedation.  These   medications may affect your judgment, balance and coordination.  Therefore,   for 24 hours, you have the following restrictions:   - DO NOT drive a car, operate machinery, make legal/financial decisions,   sign important papers or drink alcohol.    ACTIVITY:  Today: no heavy lifting, straining or running due to procedural   sedation/anesthesia.  The following day: return to full activity including work.  DIET:  Eat and drink normally unless instructed otherwise.     TREATMENT FOR COMMON SIDE EFFECTS:  - Mild abdominal pain, nausea, belching, bloating or excessive gas:  rest,   eat lightly and use a heating pad.  - Sore Throat: treat with throat lozenges and/or gargle with warm salt   water.  - Because air was used during the procedure, expelling large amounts of air   from your rectum or belching is normal.  - If a bowel prep was taken, you may not have a bowel movement for 1-3 days.    This is normal.  SYMPTOMS TO WATCH FOR AND REPORT TO YOUR PHYSICIAN:  1. Abdominal pain or bloating, other than gas cramps.  2. Chest pain.  3. Back pain.  4. Signs of infection such as: chills or fever occurring within 24 hours   after the procedure.  5. Rectal bleeding, which would show as bright red, maroon, or black stools.   (A tablespoon of blood from the rectum is not serious, especially if   hemorrhoids are present.)  6. Vomiting.  7. Weakness or dizziness.  GO DIRECTLY TO THE NEAREST EMERGENCY ROOM IF YOU HAVE ANY OF THE FOLLOWING:      Difficulty breathing              Chills and/or fever over 101 F   Persistent vomiting and/or vomiting blood   Severe abdominal pain   Severe chest pain   Black, tarry stools   Bleeding- more than one  tablespoon   Any other symptom or condition that you feel may need urgent attention  Your doctor recommends these additional instructions:  If any biopsies were taken, your doctors clinic will contact you in 1 to 2   weeks with any results.  - Discharge patient to home.   - Repeat colonoscopy in 10 years for screening purposes.   - Patient has a contact number available for emergencies.  The signs and   symptoms of potential delayed complications were discussed with the   patient.  Return to normal activities tomorrow.  Written discharge   instructions were provided to the patient.   - The findings and recommendations were discussed with the patient.  For questions, problems or results please call your physician - Dennis Santos MD at Work:  [phone].  [facility], EMERGENCY ROOM PHONE NUMBER: [phone]  IF A COMPLICATION OR EMERGENCY SITUATION ARISES AND YOU ARE UNABLE TO REACH   YOUR PHYSICIAN - GO DIRECTLY TO THE EMERGENCY ROOM.  Dennis Santos MD  11/12/2020 10:20:19 AM  This report has been verified and signed electronically.  [employer]

## 2020-11-12 NOTE — DISCHARGE INSTRUCTIONS
Colonoscopy     A camera attached to a flexible tube with a viewing lens is used to take video pictures.     Colonoscopy is a test to view the inside of your lower digestive tract (colon and rectum). Sometimes it can show the last part of the small intestine (ileum). During the test, small pieces of tissue may be removed for testing. This is called a biopsy. Small growths, such as polyps, may also be removed.   Why is colonoscopy done?  The test is done to help look for colon cancer. And it can help find the source of abdominal pain, bleeding, and changes in bowel habits. It may be needed once a year, depending on factors such as your:  · Age  · Health history  · Family health history  · Symptoms  · Results from any prior colonoscopy  Risks and possible complications  These include:  · Bleeding               · A puncture or tear in the colon   · Risks of anesthesia  · A cancer lesion not being seen  Getting ready   To prepare for the test:  · Talk with your healthcare provider about the risks of the test (see below). Also ask your healthcare provider about alternatives to the test.  · Tell your healthcare provider about any medicines you take. Also tell him or her about any health conditions you may have.  · Make sure your rectum and colon are empty for the test. Follow the diet and bowel prep instructions exactly. If you dont, the test may need to be rescheduled.  · Plan for a friend or family member to drive you home after the test.     Colonoscopy provides an inside view of the entire colon.     You may discuss the results with your doctor right away or at a future visit.  During the test   The test is usually done in the hospital on an outpatient basis. This means you go home the same day. The procedure takes about 30 minutes. During that time:  · You are given relaxing (sedating) medicine through an IV line. You may be drowsy, or fully asleep.  · The healthcare provider will first give you a physical exam to  check for anal and rectal problems.  · Then the anus is lubricated and the scope inserted.  · If you are awake, you may have a feeling similar to needing to have a bowel movement. You may also feel pressure as air is pumped into the colon. Its OK to pass gas during the procedure.  · Biopsy, polyp removal, or other treatments may be done during the test.  After the test   You may have gas right after the test. It can help to try to pass it to help prevent later bloating. Your healthcare provider may discuss the results with you right away. Or you may need to schedule a follow-up visit to talk about the results. After the test, you can go back to your normal eating and other activities. You may be tired from the sedation and need to rest for a few hours.  Date Last Reviewed: 11/1/2016 © 2000-2017 [employer]. [address] [place] [address], [place] [address]. All rights reserved. This information is not intended as a substitute for professional medical care. Always follow your healthcare professional's instructions.

## 2020-11-12 NOTE — H&P
Short Stay Endoscopy History and Physical    PCP - Edward Hernandez MD  Referring Physician - Edward Hernandez MD  [address] [place] [address]    Procedure - Colonoscopy  ASA - per anesthesia  Mallampati - per anesthesia  History of Anesthesia problems - no  Family history Anesthesia problems -  no   Plan of anesthesia - General    HPI  60 y.o. female  Reason for procedure:   Screen for colon cancer [Z12.11]        ROS:  Constitutional: No fevers, chills, No weight loss  CV: No chest pain  Pulm: No cough, No shortness of breath  GI: see HPI    Medical History:  has a past medical history of Acute blood loss anemia (2019), Ataxia (2019), Diabetes mellitus, type 2, Hyperlipidemia, Hypertension, Left hand weakness (2019), and Thyroid disease.    Surgical History:  has a past surgical history that includes  section; Abdominal surgery; and Fracture surgery.    Family History: family history is not on file..    Social History:  reports that she has never smoked. She has never used smokeless tobacco. She reports previous alcohol use. She reports that she does not use drugs.    Review of patient's allergies indicates:  No Known Allergies    Medications:   Medications Prior to Admission   Medication Sig Dispense Refill Last Dose    amLODIPine (NORVASC) 10 MG tablet Take 1 tablet (10 mg total) by mouth once daily. 90 tablet 3 Past Week at Unknown time    atorvastatin (LIPITOR) 10 MG tablet Take 10 mg by mouth once daily.   Past Week at Unknown time    celecoxib (CELEBREX) 200 MG capsule Take 1 capsule (200 mg total) by mouth 2 (two) times daily as needed for Pain. 60 capsule 1 Past Week at Unknown time    gabapentin (NEURONTIN) 100 MG capsule    Past Week at Unknown time    glimepiride (AMARYL) 4 MG tablet Take 1 tablet (4 mg total) by mouth daily with breakfast. 90 tablet 1 Past Week at Unknown time    levothyroxine (SYNTHROID) 50 MCG tablet Take 1 tablet (50 mcg total) by  mouth once daily. 90 tablet 1 11/11/2020 at Unknown time    losartan (COZAAR) 50 MG tablet Take 1 tablet by mouth once daily 90 tablet 1 Past Week at Unknown time    metFORMIN (GLUCOPHAGE) 1000 MG tablet Take 1 tablet (1,000 mg total) by mouth 2 (two) times daily with meals. 180 tablet 1 11/11/2020 at Unknown time    ondansetron (ZOFRAN-ODT) 4 MG TbDL Take 1 tablet (4 mg total) by mouth every 8 (eight) hours as needed. 30 tablet 0 Past Month at Unknown time    polyethylene glycol (GLYCOLAX) 17 gram PwPk Take 17 g by mouth once daily. 30 packet 2 Past Week at Unknown time    senna-docusate 8.6-50 mg (PERICOLACE) 8.6-50 mg per tablet Take 2 tablets by mouth 2 (two) times daily. 180 tablet 2 Past Month at Unknown time    oxyCODONE (ROXICODONE) 5 MG immediate release tablet Take 1 tablet (5 mg total) by mouth every 6 (six) hours as needed for Pain. (Patient not taking: Reported on 11/3/2020) 20 tablet 0 More than a month at Unknown time    pneumococcal 23-brooklyn ps (PNEUMOVAX-23) 25 mcg/0.5 mL vaccine Inject into the muscle. 0.5 mL 0        Physical Exam:    Vital Signs:   Vitals:    11/12/20 0911   Pulse: 93   Resp: 18   Temp: 97.9 °F (36.6 °C)       General Appearance: Well appearing in no acute distress  Abdomen: Soft, non tender, non distended with normal bowel sounds, no masses    Labs:  Lab Results   Component Value Date    WBC 12.29 06/24/2020    HGB 9.0 (L) 06/24/2020    HCT 29.5 (L) 06/24/2020     (H) 06/24/2020    CHOL 190 06/24/2020    TRIG 66 06/24/2020    HDL 38 (L) 06/24/2020    ALT 16 06/24/2020    AST 15 06/24/2020     06/24/2020    K 4.1 06/24/2020     06/24/2020    CREATININE 0.8 06/24/2020    BUN 18 06/24/2020    CO2 24 06/24/2020    TSH 3.737 06/24/2020    INR 1.0 11/11/2019    HGBA1C 6.0 (H) 06/24/2020       I have explained the risks and benefits of this endoscopic procedure to the patient including but not limited to bleeding, inflammation, infection, perforation, and  death.      Dennis Santos MD

## 2020-11-12 NOTE — ANESTHESIA POSTPROCEDURE EVALUATION
Anesthesia Post Evaluation    Patient: Lala Saxena    Procedure(s) Performed: Procedure(s) (LRB):  COLONOSCOPY (N/A)    Final Anesthesia Type: general    Patient location during evaluation: GI PACU  Patient participation: Yes- Able to Participate  Level of consciousness: awake and alert  Post-procedure vital signs: reviewed and stable  Pain management: adequate  Airway patency: patent    PONV status at discharge: No PONV  Anesthetic complications: no      Cardiovascular status: stable  Respiratory status: unassisted and spontaneous ventilation  Hydration status: euvolemic  Follow-up not needed.          Vitals Value Taken Time   /82 11/12/20 1134   Temp 36.4 °C (97.5 °F) 11/12/20 1025   Pulse 82 11/12/20 1134   Resp 15 11/12/20 1134   SpO2 99 % 11/12/20 1134         Event Time   Out of Recovery 11:36:16         Pain/Keanu Score: Keanu Score: 10 (11/12/2020 10:42 AM)

## 2020-11-12 NOTE — TRANSFER OF CARE
"Anesthesia Transfer of Care Note    Patient: Lala Saxena    Procedure(s) Performed: Procedure(s) (LRB):  COLONOSCOPY (N/A)    Patient location: GI    Anesthesia Type: general    Transport from OR: Transported from OR on room air with adequate spontaneous ventilation    Post pain: adequate analgesia    Post assessment: no apparent anesthetic complications and tolerated procedure well    Post vital signs: stable    Level of consciousness: awake, oriented and alert    Nausea/Vomiting: no nausea/vomiting    Complications: none    Transfer of care protocol was followed      Last vitals:   Visit Vitals  BP (!) 231/97 (Patient Position: Lying)   Pulse 93   Temp 36.6 °C (97.9 °F) (Temporal)   Resp 18   Ht 5' 4" (1.626 m)   Wt 81.6 kg (180 lb)   SpO2 100%   Breastfeeding No   BMI 30.90 kg/m²     "

## 2020-11-12 NOTE — PLAN OF CARE
Pt with elevated blood pressure still in the recovery area.  See Vital signs flowsheets.  MD anesthesia Ray at bedside to assess and labetalol ordered and administered.  Keeping patient 30 additional minutes to monitor post labetalol administration then will discharge.

## 2020-12-02 ENCOUNTER — OFFICE VISIT (OUTPATIENT)
Dept: OPTOMETRY | Facility: CLINIC | Age: 60
End: 2020-12-02
Payer: COMMERCIAL

## 2020-12-02 DIAGNOSIS — E11.3313 TYPE 2 DIABETES MELLITUS WITH MODERATE NONPROLIFERATIVE RETINOPATHY OF BOTH EYES AND MACULAR EDEMA, UNSPECIFIED WHETHER LONG TERM INSULIN USE: Primary | ICD-10-CM

## 2020-12-02 DIAGNOSIS — H25.13 NUCLEAR SCLEROSIS OF BOTH EYES: ICD-10-CM

## 2020-12-02 DIAGNOSIS — E11.40 TYPE 2 DIABETES MELLITUS WITH DIABETIC NEUROPATHY, WITHOUT LONG-TERM CURRENT USE OF INSULIN: ICD-10-CM

## 2020-12-02 PROCEDURE — 92004 COMPRE OPH EXAM NEW PT 1/>: CPT | Mod: S$GLB,,, | Performed by: OPTOMETRIST

## 2020-12-02 PROCEDURE — 92004 PR EYE EXAM, NEW PATIENT,COMPREHESV: ICD-10-PCS | Mod: S$GLB,,, | Performed by: OPTOMETRIST

## 2020-12-02 PROCEDURE — 99999 PR PBB SHADOW E&M-EST. PATIENT-LVL III: CPT | Mod: PBBFAC,,, | Performed by: OPTOMETRIST

## 2020-12-02 PROCEDURE — 99999 PR PBB SHADOW E&M-EST. PATIENT-LVL III: ICD-10-PCS | Mod: PBBFAC,,, | Performed by: OPTOMETRIST

## 2020-12-02 RX ORDER — POLYETHYLENE GLYCOL-3350 AND ELECTROLYTES WITH FLAVOR PACK 240; 5.84; 2.98; 6.72; 22.72 G/278.26G; G/278.26G; G/278.26G; G/278.26G; G/278.26G
POWDER, FOR SOLUTION ORAL
COMMUNITY
Start: 2020-10-26 | End: 2021-12-07

## 2020-12-02 NOTE — LETTER
December 2, 2020      Edward Hernandez MD  [address] [facility] [address]           [facility]  [address]  [place] [address]  Phone: [phone]          Patient: Lala Saxena   MR Number: [id number]   YOB: 1960   Date of Visit: 12/2/2020       Dear Dr. Edward Hernandez:    Thank you for referring Lala Saxena to me for evaluation. Attached you will find relevant portions of my assessment and plan of care.    If you have questions, please do not hesitate to call me. I look forward to following Lala Saxena along with you.    Sincerely,    Tresa Guido, OD    Enclosure  CC:  No Recipients    If you would like to receive this communication electronically, please contact [email or web address] or [phone] to request more information on [facility] Link access.    For providers and/or their staff who would like to refer a patient to [facility], please contact us through our one-stop-shop provider referral line, [facility], at [phone].    If you feel you have received this communication in error or would no longer like to receive these types of communications, please e-mail [email or web address]

## 2020-12-02 NOTE — PROGRESS NOTES
HPI     Ms. Lala Saxena was referred by PCP for an annaul diabetic   exam.    Patient complains of she was told she needs an eye exam. No other ocular   complaints. Patient is very nervous.     Would patient like a refraction today? no     Paitent denies diplopia, headaches, flashes/floaters, itching, tearing,   burning, redness, and pain.    (-)drops    (+)Diabetes  LBS   Hemoglobin A1C       Date                     Value               Ref Range             Status                06/24/2020               6.0 (H)             4.0 - 5.6 %           Final                  12/02/2019               6.8 (H)             4.0 - 5.6 %           Final                 OCULAR HISTORY  Last Eye Exam: 1 year in [place]  (-)eye surgery   (-)diagnosed or treated for any eye conditions or diseases, n/a    FAMILY HISTORY  (-)Glaucoma      Last edited by Tresa Guido, OD on 12/2/2020 12:53 PM. (History)            Assessment /Plan     For exam results, see Encounter Report.    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 Educated pt on findings. Significant retinal changes due to DM. Discussed with patient the importance of retina f/u due to potential LOV/blindness. Patient notes understanding. Appointment scheduled.   Patient notes no previous ocular surgeries, laser procedures, or injections. However, she does report seeing an eye specialist (for unknown reason) in [place]. Monitor.     3. Educated pt on findings. Not visually significant. No need for removal at this time. Monitor yearly.       NOTE: Patient extremely nervous during exam today. She  reports recently losing a grandchild and going through many hardships. She was constantly scratching and getting up from exam chair. Patient notes she is usually extremely anxious when attending doctor's appointments, however, this behavior has not been noted in the past. Patient's spouse drove her to appointment but was not in exam room during appointment. Dicussed with  after exam about findings and importance of retina f/u. Patient requested me to call her friend. Discussed Dx with friend over patient's cell phone and recommended friend to check in on patient later today due to anxiety. Will reach out to PCP.        RTC with retina, me prn.

## 2020-12-02 NOTE — Clinical Note
Good afternoon!     I saw our mutual patient today for her eye exam. She has significant retinal changes due to diabetes so I am referring her to our retina specialist. However, I wanted to reach out to you due to her affect today. She was extremely nervous and anxious.... to the point where I thought she may have a panic attack. She informed me this is very common when she goes to the doctor, however, I did not see any notes in recent charts. I wanted you to be aware! I told her to reach out if she doesn't feel better post-exam.     Thanks,     Tresa Guido

## 2020-12-10 LAB
FINAL PATHOLOGIC DIAGNOSIS: NORMAL
Lab: NORMAL

## 2020-12-23 ENCOUNTER — PATIENT OUTREACH (OUTPATIENT)
Dept: ADMINISTRATIVE | Facility: OTHER | Age: 60
End: 2020-12-23

## 2020-12-23 DIAGNOSIS — E11.40 TYPE 2 DIABETES MELLITUS WITH DIABETIC NEUROPATHY, WITHOUT LONG-TERM CURRENT USE OF INSULIN: Primary | ICD-10-CM

## 2020-12-23 NOTE — PROGRESS NOTES
Requested updates within Care Everywhere.  Patient's chart was reviewed for overdue JAMES topics.  Immunizations reconciled.    Orders placed:Hemoglobin A1c

## 2020-12-30 ENCOUNTER — OFFICE VISIT (OUTPATIENT)
Dept: OPHTHALMOLOGY | Facility: CLINIC | Age: 60
End: 2020-12-30
Payer: COMMERCIAL

## 2020-12-30 ENCOUNTER — TELEPHONE (OUTPATIENT)
Dept: OPHTHALMOLOGY | Facility: CLINIC | Age: 60
End: 2020-12-30

## 2020-12-30 DIAGNOSIS — E11.36 DIABETIC CATARACT OF BOTH EYES: ICD-10-CM

## 2020-12-30 DIAGNOSIS — E11.3313 TYPE 2 DIABETES MELLITUS WITH BOTH EYES AFFECTED BY MODERATE NONPROLIFERATIVE RETINOPATHY AND MACULAR EDEMA, WITHOUT LONG-TERM CURRENT USE OF INSULIN: Primary | ICD-10-CM

## 2020-12-30 PROCEDURE — 1126F PR PAIN SEVERITY QUANTIFIED, NO PAIN PRESENT: ICD-10-PCS | Mod: S$GLB,,, | Performed by: OPHTHALMOLOGY

## 2020-12-30 PROCEDURE — 67028 PR INJECT INTRAVITREAL PHARMCOLOGIC: ICD-10-PCS | Mod: RT,S$GLB,, | Performed by: OPHTHALMOLOGY

## 2020-12-30 PROCEDURE — 67028 INJECTION EYE DRUG: CPT | Mod: RT,S$GLB,, | Performed by: OPHTHALMOLOGY

## 2020-12-30 PROCEDURE — 1126F AMNT PAIN NOTED NONE PRSNT: CPT | Mod: S$GLB,,, | Performed by: OPHTHALMOLOGY

## 2020-12-30 PROCEDURE — 92004 COMPRE OPH EXAM NEW PT 1/>: CPT | Mod: 25,S$GLB,, | Performed by: OPHTHALMOLOGY

## 2020-12-30 PROCEDURE — 99999 PR PBB SHADOW E&M-EST. PATIENT-LVL III: ICD-10-PCS | Mod: PBBFAC,,, | Performed by: OPHTHALMOLOGY

## 2020-12-30 PROCEDURE — 92004 PR EYE EXAM, NEW PATIENT,COMPREHESV: ICD-10-PCS | Mod: 25,S$GLB,, | Performed by: OPHTHALMOLOGY

## 2020-12-30 PROCEDURE — 92134 CPTRZ OPH DX IMG PST SGM RTA: CPT | Mod: S$GLB,,, | Performed by: OPHTHALMOLOGY

## 2020-12-30 PROCEDURE — 99999 PR PBB SHADOW E&M-EST. PATIENT-LVL III: CPT | Mod: PBBFAC,,, | Performed by: OPHTHALMOLOGY

## 2020-12-30 PROCEDURE — 92134 POSTERIOR SEGMENT OCT RETINA (OCULAR COHERENCE TOMOGRAPHY)-BOTH EYES: ICD-10-PCS | Mod: S$GLB,,, | Performed by: OPHTHALMOLOGY

## 2020-12-30 RX ADMIN — Medication 1.25 MG: at 03:12

## 2020-12-30 NOTE — PATIENT INSTRUCTIONS

## 2020-12-30 NOTE — LETTER
December 30, 2020      Tresa Guido, OD  [address]  [facility] [address]           [facility] 1st Fl  [address]  [facility] [address]  Phone: [phone]  Fax: [phone]          Patient: Lala Saxena   MR Number: [id number]   YOB: 1960   Date of Visit: 12/30/2020       Dear Dr. Tresa Guido:    Thank you for referring Lala Saxena to me for evaluation. Attached you will find relevant portions of my assessment and plan of care.    If you have questions, please do not hesitate to call me. I look forward to following Lala Saxena along with you.    Sincerely,    Rahul Zimmerman MD    Enclosure  CC:  No Recipients    If you would like to receive this communication electronically, please contact [email or web address] or [phone] to request more information on [employer] Link access.    For providers and/or their staff who would like to refer a patient to [facility], please contact us through our one-stop-shop provider referral line, [facility], at [phone].    If you feel you have received this communication in error or would no longer like to receive these types of communications, please e-mail [email or web address]

## 2020-12-31 NOTE — PROGRESS NOTES
Subjective:       Patient ID: Lala Saxena is a 60 y.o. female      Chief Complaint   Patient presents with    Diabetic Eye Exam     History of Present Illness  HPI     Referred by Dr. Guido    Pt states that she was sent here to get a specialist exam of the retina,   vision is hazy for about a monthOD>OS. No f/f/pain.     DM x 1 yr    Hemoglobin A1C       Date                     Value               Ref Range             Status                06/24/2020               6.0 (H)             4.0 - 5.6 %           Final                  12/02/2019               6.8 (H)             4.0 - 5.6 %           Final                 OCULAR HISTORY  Last Eye Exam: 1 year in [place]  (-)eye surgery   (-)diagnosed or treated for any eye conditions or diseases, n/a    FAMILY HISTORY  (-)Glaucoma      Last edited by Rahul Zimmerman MD on 12/30/2020  3:27 PM. (History)        Imaging:    See report    Assessment/Plan:     1. Type 2 diabetes mellitus with both eyes affected by moderate nonproliferative retinopathy and macular edema, without long-term current use of insulin  Diabetic Retinopathy discussed in detail, all questions answered  Stressed importance of good BS/BP/Chol Control  RTC immediately PRN any vision changes, becca blurry vision, missing vision, floaters, distortions, etc    Recommend Avastin OU.  RBA discussed extensively  Pt will proceed with one eye today.  Will inj OD since pt feels it's worse    - bevacizumab (AVASTIN) 2.5 mg/0.10 mL injection 1.25 mg  - Prior authorization Order  - Posterior Segment OCT Retina-Both eyes    2. Diabetic cataract of both eyes  Observe for now.  Discussed may need CE to get best Va    Follow up in about 2 weeks (around 1/13/2021), or if symptoms worsen or fail to improve, for Injection Left eye, Avastin.     Patient identified.  Timeout performed.    Risks, benefits, and alternatives to treatment were discussed in detail with the patient, including bleeding/infection  (endophthalmitis)/etc.  The patient voiced understanding and wished to proceed with the procedure.  See separate consent form.    Injection Procedure Note:  Diagnosis: CSME Right Eye    Topical Proparacaine drop placed then topical 5% Betadine  Sterile gloves used, and sterile lid speculum placed.  5% Betadine placed at injection site again prior to injection.  Avastin 1.25mg in 0.05cc Injected inferotemporally 3.5-4mm posterior to the limbus.  Complications: None  Va at least CF at 5 feet post injection.  Retina, ONH, IOP normal after injection.    Followup as above.  Patient should return immediately PRN.  Retinal Detachment and Endophthalmitis precautions given.

## 2021-01-14 ENCOUNTER — OFFICE VISIT (OUTPATIENT)
Dept: OPHTHALMOLOGY | Facility: CLINIC | Age: 61
End: 2021-01-14
Payer: COMMERCIAL

## 2021-01-14 DIAGNOSIS — E11.3313 TYPE 2 DIABETES MELLITUS WITH BOTH EYES AFFECTED BY MODERATE NONPROLIFERATIVE RETINOPATHY AND MACULAR EDEMA, WITHOUT LONG-TERM CURRENT USE OF INSULIN: Primary | ICD-10-CM

## 2021-01-14 PROCEDURE — 67028 PR INJECT INTRAVITREAL PHARMCOLOGIC: ICD-10-PCS | Mod: LT,S$GLB,, | Performed by: OPHTHALMOLOGY

## 2021-01-14 PROCEDURE — 99499 UNLISTED E&M SERVICE: CPT | Mod: S$GLB,,, | Performed by: OPHTHALMOLOGY

## 2021-01-14 PROCEDURE — 1126F AMNT PAIN NOTED NONE PRSNT: CPT | Mod: S$GLB,,, | Performed by: OPHTHALMOLOGY

## 2021-01-14 PROCEDURE — 67028 INJECTION EYE DRUG: CPT | Mod: LT,S$GLB,, | Performed by: OPHTHALMOLOGY

## 2021-01-14 PROCEDURE — 99999 PR PBB SHADOW E&M-EST. PATIENT-LVL III: CPT | Mod: PBBFAC,,, | Performed by: OPHTHALMOLOGY

## 2021-01-14 PROCEDURE — 99999 PR PBB SHADOW E&M-EST. PATIENT-LVL III: ICD-10-PCS | Mod: PBBFAC,,, | Performed by: OPHTHALMOLOGY

## 2021-01-14 PROCEDURE — 99499 NO LOS: ICD-10-PCS | Mod: S$GLB,,, | Performed by: OPHTHALMOLOGY

## 2021-01-14 PROCEDURE — 1126F PR PAIN SEVERITY QUANTIFIED, NO PAIN PRESENT: ICD-10-PCS | Mod: S$GLB,,, | Performed by: OPHTHALMOLOGY

## 2021-01-14 RX ADMIN — Medication 1.25 MG: at 02:01

## 2021-02-04 ENCOUNTER — PROCEDURE VISIT (OUTPATIENT)
Dept: OPHTHALMOLOGY | Facility: CLINIC | Age: 61
End: 2021-02-04
Payer: COMMERCIAL

## 2021-02-04 DIAGNOSIS — E11.3313 TYPE 2 DIABETES MELLITUS WITH BOTH EYES AFFECTED BY MODERATE NONPROLIFERATIVE RETINOPATHY AND MACULAR EDEMA, WITHOUT LONG-TERM CURRENT USE OF INSULIN: Primary | ICD-10-CM

## 2021-02-04 PROCEDURE — 67028 PR INJECT INTRAVITREAL PHARMCOLOGIC: ICD-10-PCS | Mod: RT,S$GLB,, | Performed by: OPHTHALMOLOGY

## 2021-02-04 PROCEDURE — 92134 CPTRZ OPH DX IMG PST SGM RTA: CPT | Mod: S$GLB,,, | Performed by: OPHTHALMOLOGY

## 2021-02-04 PROCEDURE — 99499 NO LOS: ICD-10-PCS | Mod: S$GLB,,, | Performed by: OPHTHALMOLOGY

## 2021-02-04 PROCEDURE — 92134 POSTERIOR SEGMENT OCT RETINA (OCULAR COHERENCE TOMOGRAPHY)-BOTH EYES: ICD-10-PCS | Mod: S$GLB,,, | Performed by: OPHTHALMOLOGY

## 2021-02-04 PROCEDURE — 67028 INJECTION EYE DRUG: CPT | Mod: RT,S$GLB,, | Performed by: OPHTHALMOLOGY

## 2021-02-04 PROCEDURE — 99499 UNLISTED E&M SERVICE: CPT | Mod: S$GLB,,, | Performed by: OPHTHALMOLOGY

## 2021-02-04 RX ADMIN — Medication 1.25 MG: at 09:02

## 2021-03-04 ENCOUNTER — PROCEDURE VISIT (OUTPATIENT)
Dept: OPHTHALMOLOGY | Facility: CLINIC | Age: 61
End: 2021-03-04
Payer: COMMERCIAL

## 2021-03-04 DIAGNOSIS — E11.3313 TYPE 2 DIABETES MELLITUS WITH BOTH EYES AFFECTED BY MODERATE NONPROLIFERATIVE RETINOPATHY AND MACULAR EDEMA, WITHOUT LONG-TERM CURRENT USE OF INSULIN: Primary | ICD-10-CM

## 2021-03-04 PROCEDURE — 67028 PR INJECT INTRAVITREAL PHARMCOLOGIC: ICD-10-PCS | Mod: LT,S$GLB,, | Performed by: OPHTHALMOLOGY

## 2021-03-04 PROCEDURE — 99499 NO LOS: ICD-10-PCS | Mod: S$GLB,,, | Performed by: OPHTHALMOLOGY

## 2021-03-04 PROCEDURE — 67028 INJECTION EYE DRUG: CPT | Mod: LT,S$GLB,, | Performed by: OPHTHALMOLOGY

## 2021-03-04 PROCEDURE — 92134 POSTERIOR SEGMENT OCT RETINA (OCULAR COHERENCE TOMOGRAPHY)-BOTH EYES: ICD-10-PCS | Mod: S$GLB,,, | Performed by: OPHTHALMOLOGY

## 2021-03-04 PROCEDURE — 92134 CPTRZ OPH DX IMG PST SGM RTA: CPT | Mod: S$GLB,,, | Performed by: OPHTHALMOLOGY

## 2021-03-04 PROCEDURE — 99499 UNLISTED E&M SERVICE: CPT | Mod: S$GLB,,, | Performed by: OPHTHALMOLOGY

## 2021-03-04 RX ADMIN — Medication 1.25 MG: at 03:03

## 2021-03-11 ENCOUNTER — PROCEDURE VISIT (OUTPATIENT)
Dept: OPHTHALMOLOGY | Facility: CLINIC | Age: 61
End: 2021-03-11
Payer: COMMERCIAL

## 2021-03-11 DIAGNOSIS — E11.3313 TYPE 2 DIABETES MELLITUS WITH BOTH EYES AFFECTED BY MODERATE NONPROLIFERATIVE RETINOPATHY AND MACULAR EDEMA, WITHOUT LONG-TERM CURRENT USE OF INSULIN: Primary | ICD-10-CM

## 2021-03-11 PROCEDURE — 67028 PR INJECT INTRAVITREAL PHARMCOLOGIC: ICD-10-PCS | Mod: RT,S$GLB,, | Performed by: OPHTHALMOLOGY

## 2021-03-11 PROCEDURE — 67028 INJECTION EYE DRUG: CPT | Mod: RT,S$GLB,, | Performed by: OPHTHALMOLOGY

## 2021-03-11 RX ADMIN — Medication 1.25 MG: at 03:03

## 2021-04-01 ENCOUNTER — PROCEDURE VISIT (OUTPATIENT)
Dept: OPHTHALMOLOGY | Facility: CLINIC | Age: 61
End: 2021-04-01
Payer: COMMERCIAL

## 2021-04-01 DIAGNOSIS — E11.3313 TYPE 2 DIABETES MELLITUS WITH BOTH EYES AFFECTED BY MODERATE NONPROLIFERATIVE RETINOPATHY AND MACULAR EDEMA, WITHOUT LONG-TERM CURRENT USE OF INSULIN: Primary | ICD-10-CM

## 2021-04-01 PROCEDURE — 67028 INJECTION EYE DRUG: CPT | Mod: RT,S$GLB,, | Performed by: OPHTHALMOLOGY

## 2021-04-01 PROCEDURE — 99499 UNLISTED E&M SERVICE: CPT | Mod: S$GLB,,, | Performed by: OPHTHALMOLOGY

## 2021-04-01 PROCEDURE — 92134 CPTRZ OPH DX IMG PST SGM RTA: CPT | Mod: S$GLB,,, | Performed by: OPHTHALMOLOGY

## 2021-04-01 PROCEDURE — 92134 POSTERIOR SEGMENT OCT RETINA (OCULAR COHERENCE TOMOGRAPHY)-BOTH EYES: ICD-10-PCS | Mod: S$GLB,,, | Performed by: OPHTHALMOLOGY

## 2021-04-01 PROCEDURE — 67028 PR INJECT INTRAVITREAL PHARMCOLOGIC: ICD-10-PCS | Mod: RT,S$GLB,, | Performed by: OPHTHALMOLOGY

## 2021-04-01 PROCEDURE — 99499 NO LOS: ICD-10-PCS | Mod: S$GLB,,, | Performed by: OPHTHALMOLOGY

## 2021-04-01 RX ADMIN — Medication 1.25 MG: at 04:04

## 2021-04-16 ENCOUNTER — PROCEDURE VISIT (OUTPATIENT)
Dept: OPHTHALMOLOGY | Facility: CLINIC | Age: 61
End: 2021-04-16
Payer: COMMERCIAL

## 2021-04-16 DIAGNOSIS — E11.3313 TYPE 2 DIABETES MELLITUS WITH BOTH EYES AFFECTED BY MODERATE NONPROLIFERATIVE RETINOPATHY AND MACULAR EDEMA, WITHOUT LONG-TERM CURRENT USE OF INSULIN: Primary | ICD-10-CM

## 2021-04-16 PROCEDURE — 67028 INJECTION EYE DRUG: CPT | Mod: RT,S$GLB,, | Performed by: OPHTHALMOLOGY

## 2021-04-16 PROCEDURE — 99499 UNLISTED E&M SERVICE: CPT | Mod: S$GLB,,, | Performed by: OPHTHALMOLOGY

## 2021-04-16 PROCEDURE — 67028 PR INJECT INTRAVITREAL PHARMCOLOGIC: ICD-10-PCS | Mod: RT,S$GLB,, | Performed by: OPHTHALMOLOGY

## 2021-04-16 PROCEDURE — 99499 NO LOS: ICD-10-PCS | Mod: S$GLB,,, | Performed by: OPHTHALMOLOGY

## 2021-04-16 RX ADMIN — Medication 1.25 MG: at 01:04

## 2021-04-22 ENCOUNTER — PATIENT OUTREACH (OUTPATIENT)
Dept: ADMINISTRATIVE | Facility: HOSPITAL | Age: 61
End: 2021-04-22

## 2021-05-03 ENCOUNTER — TELEPHONE (OUTPATIENT)
Dept: OPHTHALMOLOGY | Facility: CLINIC | Age: 61
End: 2021-05-03

## 2021-05-11 DIAGNOSIS — E11.9 TYPE 2 DIABETES MELLITUS WITHOUT COMPLICATION: ICD-10-CM

## 2021-06-17 RX ORDER — METFORMIN HYDROCHLORIDE 1000 MG/1
1000 TABLET ORAL 2 TIMES DAILY WITH MEALS
Qty: 180 TABLET | Refills: 0 | Status: SHIPPED | OUTPATIENT
Start: 2021-06-17 | End: 2021-10-12 | Stop reason: SDUPTHER

## 2021-07-13 DIAGNOSIS — E03.4 HYPOTHYROIDISM DUE TO ACQUIRED ATROPHY OF THYROID: ICD-10-CM

## 2021-07-13 RX ORDER — AMLODIPINE BESYLATE 10 MG/1
10 TABLET ORAL DAILY
Qty: 90 TABLET | Refills: 0 | Status: SHIPPED | OUTPATIENT
Start: 2021-07-13 | End: 2021-07-14 | Stop reason: SDUPTHER

## 2021-07-13 RX ORDER — LEVOTHYROXINE SODIUM 50 UG/1
50 TABLET ORAL DAILY
Qty: 90 TABLET | Refills: 0 | Status: SHIPPED | OUTPATIENT
Start: 2021-07-13 | End: 2021-07-14 | Stop reason: SDUPTHER

## 2021-07-14 RX ORDER — AMLODIPINE BESYLATE 10 MG/1
10 TABLET ORAL DAILY
Qty: 90 TABLET | Refills: 0 | Status: SHIPPED | OUTPATIENT
Start: 2021-07-14 | End: 2021-10-12 | Stop reason: SDUPTHER

## 2021-07-14 RX ORDER — LEVOTHYROXINE SODIUM 50 UG/1
50 TABLET ORAL DAILY
Qty: 90 TABLET | Refills: 0 | Status: SHIPPED | OUTPATIENT
Start: 2021-07-14 | End: 2021-10-12 | Stop reason: SDUPTHER

## 2021-08-17 NOTE — ANESTHESIA POSTPROCEDURE EVALUATION
Anesthesia Post Evaluation    Patient: Lala Saxena    Procedure(s) Performed: Procedure(s) (LRB):  FUSION, SPINE, CERVICAL C3-6 (N/A)    Final Anesthesia Type: general    Patient location during evaluation: PACU  Patient participation: Yes- Able to Participate  Level of consciousness: awake and alert  Post-procedure vital signs: reviewed and stable  Pain management: adequate  Airway patency: patent    PONV status at discharge: No PONV  Anesthetic complications: no      Cardiovascular status: blood pressure returned to baseline  Respiratory status: unassisted  Hydration status: euvolemic  Follow-up not needed.          Vitals Value Taken Time   /65 11/20/2019  8:15 AM   Temp 36.7 °C (98 °F) 11/20/2019  8:15 AM   Pulse 88 11/20/2019  8:15 AM   Resp 16 11/20/2019  8:15 AM   SpO2 96 % 11/20/2019  8:15 AM         Event Time     Out of Recovery 10:15:00          Pain/Keanu Score: Pain Rating Prior to Med Admin: 4 (11/20/2019  5:58 AM)  Pain Rating Post Med Admin: 0 (11/19/2019  4:00 PM)  Keanu Score: 10 (11/19/2019 10:15 AM)        
17-Aug-2021 11:16

## 2021-08-25 ENCOUNTER — TELEPHONE (OUTPATIENT)
Dept: INTERNAL MEDICINE | Facility: CLINIC | Age: 61
End: 2021-08-25

## 2021-09-24 ENCOUNTER — TELEPHONE (OUTPATIENT)
Dept: INTERNAL MEDICINE | Facility: CLINIC | Age: 61
End: 2021-09-24

## 2021-10-12 DIAGNOSIS — E03.4 HYPOTHYROIDISM DUE TO ACQUIRED ATROPHY OF THYROID: ICD-10-CM

## 2021-10-12 DIAGNOSIS — G95.9 CERVICAL MYELOPATHY: Primary | ICD-10-CM

## 2021-10-12 DIAGNOSIS — E11.40 TYPE 2 DIABETES MELLITUS WITH DIABETIC NEUROPATHY, WITHOUT LONG-TERM CURRENT USE OF INSULIN: ICD-10-CM

## 2021-10-12 RX ORDER — GLIMEPIRIDE 4 MG/1
4 TABLET ORAL
Qty: 90 TABLET | Refills: 0 | Status: SHIPPED | OUTPATIENT
Start: 2021-10-12 | End: 2021-11-19 | Stop reason: SDUPTHER

## 2021-10-12 RX ORDER — ATORVASTATIN CALCIUM 10 MG/1
10 TABLET, FILM COATED ORAL DAILY
Qty: 90 TABLET | Refills: 0 | Status: SHIPPED | OUTPATIENT
Start: 2021-10-12 | End: 2021-12-07 | Stop reason: SDUPTHER

## 2021-10-12 RX ORDER — AMLODIPINE BESYLATE 10 MG/1
10 TABLET ORAL DAILY
Qty: 90 TABLET | Refills: 0 | Status: SHIPPED | OUTPATIENT
Start: 2021-10-12 | End: 2021-12-07 | Stop reason: SDUPTHER

## 2021-10-12 RX ORDER — CELECOXIB 200 MG/1
200 CAPSULE ORAL 2 TIMES DAILY PRN
Qty: 60 CAPSULE | Refills: 0 | Status: SHIPPED | OUTPATIENT
Start: 2021-10-12 | End: 2022-04-12

## 2021-10-12 RX ORDER — LOSARTAN POTASSIUM 50 MG/1
50 TABLET ORAL DAILY
Qty: 90 TABLET | Refills: 0 | Status: SHIPPED | OUTPATIENT
Start: 2021-10-12 | End: 2021-12-07 | Stop reason: SDUPTHER

## 2021-10-12 RX ORDER — METFORMIN HYDROCHLORIDE 1000 MG/1
1000 TABLET ORAL 2 TIMES DAILY WITH MEALS
Qty: 180 TABLET | Refills: 0 | Status: SHIPPED | OUTPATIENT
Start: 2021-10-12 | End: 2021-12-07 | Stop reason: SDUPTHER

## 2021-10-12 RX ORDER — LEVOTHYROXINE SODIUM 50 UG/1
50 TABLET ORAL DAILY
Qty: 90 TABLET | Refills: 0 | Status: SHIPPED | OUTPATIENT
Start: 2021-10-12 | End: 2021-12-07 | Stop reason: SDUPTHER

## 2021-10-29 ENCOUNTER — PATIENT OUTREACH (OUTPATIENT)
Dept: ADMINISTRATIVE | Facility: HOSPITAL | Age: 61
End: 2021-10-29
Payer: COMMERCIAL

## 2021-11-19 DIAGNOSIS — E11.40 TYPE 2 DIABETES MELLITUS WITH DIABETIC NEUROPATHY, WITHOUT LONG-TERM CURRENT USE OF INSULIN: ICD-10-CM

## 2021-11-19 RX ORDER — GLIMEPIRIDE 4 MG/1
4 TABLET ORAL
Qty: 90 TABLET | Refills: 0 | Status: SHIPPED | OUTPATIENT
Start: 2021-11-19 | End: 2021-12-07 | Stop reason: SDUPTHER

## 2021-12-07 ENCOUNTER — OFFICE VISIT (OUTPATIENT)
Dept: INTERNAL MEDICINE | Facility: CLINIC | Age: 61
End: 2021-12-07
Payer: COMMERCIAL

## 2021-12-07 ENCOUNTER — LAB VISIT (OUTPATIENT)
Dept: LAB | Facility: HOSPITAL | Age: 61
End: 2021-12-07
Attending: FAMILY MEDICINE
Payer: COMMERCIAL

## 2021-12-07 VITALS
WEIGHT: 186.75 LBS | HEART RATE: 51 BPM | SYSTOLIC BLOOD PRESSURE: 138 MMHG | BODY MASS INDEX: 33.09 KG/M2 | DIASTOLIC BLOOD PRESSURE: 84 MMHG | OXYGEN SATURATION: 98 % | HEIGHT: 63 IN

## 2021-12-07 DIAGNOSIS — E78.5 HYPERLIPIDEMIA ASSOCIATED WITH TYPE 2 DIABETES MELLITUS: ICD-10-CM

## 2021-12-07 DIAGNOSIS — Z00.00 ANNUAL PHYSICAL EXAM: ICD-10-CM

## 2021-12-07 DIAGNOSIS — E11.69 OBESITY, DIABETES, AND HYPERTENSION SYNDROME: ICD-10-CM

## 2021-12-07 DIAGNOSIS — E11.42 TYPE 2 DIABETES MELLITUS WITH DIABETIC POLYNEUROPATHY, WITHOUT LONG-TERM CURRENT USE OF INSULIN: ICD-10-CM

## 2021-12-07 DIAGNOSIS — E11.3393 TYPE 2 DIABETES MELLITUS WITH BOTH EYES AFFECTED BY MODERATE NONPROLIFERATIVE RETINOPATHY WITHOUT MACULAR EDEMA, WITHOUT LONG-TERM CURRENT USE OF INSULIN: ICD-10-CM

## 2021-12-07 DIAGNOSIS — I15.2 OBESITY, DIABETES, AND HYPERTENSION SYNDROME: ICD-10-CM

## 2021-12-07 DIAGNOSIS — E11.3393 TYPE 2 DIABETES MELLITUS WITH MODERATE NONPROLIFERATIVE RETINOPATHY OF BOTH EYES, WITHOUT LONG-TERM CURRENT USE OF INSULIN, MACULAR EDEMA PRESENCE UNSPECIFIED: ICD-10-CM

## 2021-12-07 DIAGNOSIS — Z11.59 NEED FOR HEPATITIS C SCREENING TEST: ICD-10-CM

## 2021-12-07 DIAGNOSIS — E11.40 TYPE 2 DIABETES MELLITUS WITH DIABETIC NEUROPATHY, WITHOUT LONG-TERM CURRENT USE OF INSULIN: ICD-10-CM

## 2021-12-07 DIAGNOSIS — Z79.899 ENCOUNTER FOR LONG-TERM (CURRENT) USE OF OTHER MEDICATIONS: ICD-10-CM

## 2021-12-07 DIAGNOSIS — E11.59 OBESITY, DIABETES, AND HYPERTENSION SYNDROME: ICD-10-CM

## 2021-12-07 DIAGNOSIS — E78.2 MIXED HYPERLIPIDEMIA: ICD-10-CM

## 2021-12-07 DIAGNOSIS — I15.2 HYPERTENSION ASSOCIATED WITH DIABETES: ICD-10-CM

## 2021-12-07 DIAGNOSIS — E11.69 TYPE 2 DIABETES MELLITUS WITH OBESITY: ICD-10-CM

## 2021-12-07 DIAGNOSIS — G95.9 CERVICAL MYELOPATHY: ICD-10-CM

## 2021-12-07 DIAGNOSIS — Z00.00 ANNUAL PHYSICAL EXAM: Primary | ICD-10-CM

## 2021-12-07 DIAGNOSIS — E03.4 HYPOTHYROIDISM DUE TO ACQUIRED ATROPHY OF THYROID: ICD-10-CM

## 2021-12-07 DIAGNOSIS — E66.9 TYPE 2 DIABETES MELLITUS WITH OBESITY: ICD-10-CM

## 2021-12-07 DIAGNOSIS — E11.59 HYPERTENSION ASSOCIATED WITH DIABETES: ICD-10-CM

## 2021-12-07 DIAGNOSIS — Z12.31 ENCOUNTER FOR SCREENING MAMMOGRAM FOR BREAST CANCER: ICD-10-CM

## 2021-12-07 DIAGNOSIS — I10 ESSENTIAL HYPERTENSION: ICD-10-CM

## 2021-12-07 DIAGNOSIS — E11.69 HYPERLIPIDEMIA ASSOCIATED WITH TYPE 2 DIABETES MELLITUS: ICD-10-CM

## 2021-12-07 DIAGNOSIS — E66.9 OBESITY, DIABETES, AND HYPERTENSION SYNDROME: ICD-10-CM

## 2021-12-07 DIAGNOSIS — E66.09 CLASS 1 OBESITY DUE TO EXCESS CALORIES WITH SERIOUS COMORBIDITY AND BODY MASS INDEX (BMI) OF 33.0 TO 33.9 IN ADULT: ICD-10-CM

## 2021-12-07 PROBLEM — E11.9 TYPE 2 DIABETES MELLITUS: Status: ACTIVE | Noted: 2021-12-07

## 2021-12-07 LAB
ALBUMIN SERPL BCP-MCNC: 3.7 G/DL (ref 3.5–5.2)
ALP SERPL-CCNC: 94 U/L (ref 55–135)
ALT SERPL W/O P-5'-P-CCNC: 16 U/L (ref 10–44)
ANION GAP SERPL CALC-SCNC: 15 MMOL/L (ref 8–16)
AST SERPL-CCNC: 16 U/L (ref 10–40)
BILIRUB SERPL-MCNC: 0.3 MG/DL (ref 0.1–1)
BUN SERPL-MCNC: 23 MG/DL (ref 8–23)
CALCIUM SERPL-MCNC: 9.7 MG/DL (ref 8.7–10.5)
CHLORIDE SERPL-SCNC: 101 MMOL/L (ref 95–110)
CHOLEST SERPL-MCNC: 191 MG/DL (ref 120–199)
CHOLEST/HDLC SERPL: 4.8 {RATIO} (ref 2–5)
CO2 SERPL-SCNC: 21 MMOL/L (ref 23–29)
CREAT SERPL-MCNC: 0.9 MG/DL (ref 0.5–1.4)
ERYTHROCYTE [DISTWIDTH] IN BLOOD BY AUTOMATED COUNT: 13.1 % (ref 11.5–14.5)
EST. GFR  (AFRICAN AMERICAN): >60 ML/MIN/1.73 M^2
EST. GFR  (NON AFRICAN AMERICAN): >60 ML/MIN/1.73 M^2
ESTIMATED AVG GLUCOSE: 137 MG/DL (ref 68–131)
GLUCOSE SERPL-MCNC: 170 MG/DL (ref 70–110)
HBA1C MFR BLD: 6.4 % (ref 4–5.6)
HCT VFR BLD AUTO: 34.2 % (ref 37–48.5)
HDLC SERPL-MCNC: 40 MG/DL (ref 40–75)
HDLC SERPL: 20.9 % (ref 20–50)
HGB BLD-MCNC: 10.6 G/DL (ref 12–16)
LDLC SERPL CALC-MCNC: 130.2 MG/DL (ref 63–159)
MCH RBC QN AUTO: 27.7 PG (ref 27–31)
MCHC RBC AUTO-ENTMCNC: 31 G/DL (ref 32–36)
MCV RBC AUTO: 89 FL (ref 82–98)
NONHDLC SERPL-MCNC: 151 MG/DL
PLATELET # BLD AUTO: 410 K/UL (ref 150–450)
PMV BLD AUTO: 10.6 FL (ref 9.2–12.9)
POTASSIUM SERPL-SCNC: 4.5 MMOL/L (ref 3.5–5.1)
PROT SERPL-MCNC: 7.8 G/DL (ref 6–8.4)
RBC # BLD AUTO: 3.83 M/UL (ref 4–5.4)
SODIUM SERPL-SCNC: 137 MMOL/L (ref 136–145)
TRIGL SERPL-MCNC: 104 MG/DL (ref 30–150)
TSH SERPL DL<=0.005 MIU/L-ACNC: 2.27 UIU/ML (ref 0.4–4)
WBC # BLD AUTO: 13 K/UL (ref 3.9–12.7)

## 2021-12-07 PROCEDURE — 4010F PR ACE/ARB THEARPY RXD/TAKEN: ICD-10-PCS | Mod: CPTII,S$GLB,, | Performed by: FAMILY MEDICINE

## 2021-12-07 PROCEDURE — 83036 HEMOGLOBIN GLYCOSYLATED A1C: CPT | Performed by: FAMILY MEDICINE

## 2021-12-07 PROCEDURE — 85027 COMPLETE CBC AUTOMATED: CPT | Performed by: FAMILY MEDICINE

## 2021-12-07 PROCEDURE — 86803 HEPATITIS C AB TEST: CPT | Performed by: FAMILY MEDICINE

## 2021-12-07 PROCEDURE — 99999 PR PBB SHADOW E&M-EST. PATIENT-LVL IV: ICD-10-PCS | Mod: PBBFAC,,, | Performed by: FAMILY MEDICINE

## 2021-12-07 PROCEDURE — 99396 PREV VISIT EST AGE 40-64: CPT | Mod: S$GLB,,, | Performed by: FAMILY MEDICINE

## 2021-12-07 PROCEDURE — 80053 COMPREHEN METABOLIC PANEL: CPT | Performed by: FAMILY MEDICINE

## 2021-12-07 PROCEDURE — 99396 PR PREVENTIVE VISIT,EST,40-64: ICD-10-PCS | Mod: S$GLB,,, | Performed by: FAMILY MEDICINE

## 2021-12-07 PROCEDURE — 4010F ACE/ARB THERAPY RXD/TAKEN: CPT | Mod: CPTII,S$GLB,, | Performed by: FAMILY MEDICINE

## 2021-12-07 PROCEDURE — 36415 COLL VENOUS BLD VENIPUNCTURE: CPT | Performed by: FAMILY MEDICINE

## 2021-12-07 PROCEDURE — 99999 PR PBB SHADOW E&M-EST. PATIENT-LVL IV: CPT | Mod: PBBFAC,,, | Performed by: FAMILY MEDICINE

## 2021-12-07 PROCEDURE — 80061 LIPID PANEL: CPT | Performed by: FAMILY MEDICINE

## 2021-12-07 PROCEDURE — 84443 ASSAY THYROID STIM HORMONE: CPT | Performed by: FAMILY MEDICINE

## 2021-12-07 RX ORDER — METFORMIN HYDROCHLORIDE 1000 MG/1
1000 TABLET ORAL 2 TIMES DAILY WITH MEALS
Qty: 180 TABLET | Refills: 3 | Status: SHIPPED | OUTPATIENT
Start: 2021-12-07 | End: 2023-05-19 | Stop reason: SDUPTHER

## 2021-12-07 RX ORDER — ATORVASTATIN CALCIUM 10 MG/1
10 TABLET, FILM COATED ORAL DAILY
Qty: 90 TABLET | Refills: 3 | Status: SHIPPED | OUTPATIENT
Start: 2021-12-07

## 2021-12-07 RX ORDER — LANCETS
EACH MISCELLANEOUS
Qty: 200 EACH | Refills: 11 | Status: SHIPPED | OUTPATIENT
Start: 2021-12-07

## 2021-12-07 RX ORDER — AMLODIPINE BESYLATE 10 MG/1
10 TABLET ORAL DAILY
Qty: 90 TABLET | Refills: 3 | Status: SHIPPED | OUTPATIENT
Start: 2021-12-07

## 2021-12-07 RX ORDER — LEVOTHYROXINE SODIUM 50 UG/1
50 TABLET ORAL
Qty: 90 TABLET | Refills: 3 | Status: SHIPPED | OUTPATIENT
Start: 2021-12-07 | End: 2022-12-19 | Stop reason: SDUPTHER

## 2021-12-07 RX ORDER — GLIMEPIRIDE 4 MG/1
4 TABLET ORAL
Qty: 90 TABLET | Refills: 3 | Status: SHIPPED | OUTPATIENT
Start: 2021-12-07 | End: 2022-12-27

## 2021-12-07 RX ORDER — LOSARTAN POTASSIUM 50 MG/1
50 TABLET ORAL DAILY
Qty: 90 TABLET | Refills: 3 | Status: SHIPPED | OUTPATIENT
Start: 2021-12-07 | End: 2022-12-07

## 2021-12-07 RX ORDER — INSULIN PUMP SYRINGE, 3 ML
EACH MISCELLANEOUS
Qty: 1 EACH | Refills: 0 | Status: SHIPPED | OUTPATIENT
Start: 2021-12-07 | End: 2022-12-07

## 2021-12-08 ENCOUNTER — TELEPHONE (OUTPATIENT)
Dept: INTERNAL MEDICINE | Facility: CLINIC | Age: 61
End: 2021-12-08
Payer: COMMERCIAL

## 2021-12-08 DIAGNOSIS — D64.9 NORMOCYTIC ANEMIA: Primary | ICD-10-CM

## 2021-12-08 DIAGNOSIS — D72.829 LEUKOCYTOSIS, UNSPECIFIED TYPE: ICD-10-CM

## 2021-12-08 PROBLEM — R80.9 TYPE 2 DIABETES MELLITUS WITH MICROALBUMINURIA, WITHOUT LONG-TERM CURRENT USE OF INSULIN: Status: ACTIVE | Noted: 2021-12-08

## 2021-12-08 PROBLEM — E11.29 MICROALBUMINURIA DUE TO TYPE 2 DIABETES MELLITUS: Status: ACTIVE | Noted: 2021-12-08

## 2021-12-08 PROBLEM — R80.9 MICROALBUMINURIA DUE TO TYPE 2 DIABETES MELLITUS: Status: ACTIVE | Noted: 2021-12-08

## 2021-12-08 PROBLEM — E11.29 TYPE 2 DIABETES MELLITUS WITH MICROALBUMINURIA, WITHOUT LONG-TERM CURRENT USE OF INSULIN: Status: ACTIVE | Noted: 2021-12-08

## 2021-12-08 LAB — HCV AB SERPL QL IA: NEGATIVE

## 2022-01-05 ENCOUNTER — LAB VISIT (OUTPATIENT)
Dept: LAB | Facility: HOSPITAL | Age: 62
End: 2022-01-05
Attending: FAMILY MEDICINE
Payer: COMMERCIAL

## 2022-01-05 DIAGNOSIS — D64.9 NORMOCYTIC ANEMIA: ICD-10-CM

## 2022-01-05 DIAGNOSIS — D72.829 LEUKOCYTOSIS, UNSPECIFIED TYPE: ICD-10-CM

## 2022-01-05 LAB
BASOPHILS # BLD AUTO: 0.08 K/UL (ref 0–0.2)
BASOPHILS NFR BLD: 0.6 % (ref 0–1.9)
DIFFERENTIAL METHOD: ABNORMAL
EOSINOPHIL # BLD AUTO: 0.4 K/UL (ref 0–0.5)
EOSINOPHIL NFR BLD: 3.4 % (ref 0–8)
ERYTHROCYTE [DISTWIDTH] IN BLOOD BY AUTOMATED COUNT: 13.2 % (ref 11.5–14.5)
FERRITIN SERPL-MCNC: 77 NG/ML (ref 20–300)
FOLATE SERPL-MCNC: 8.9 NG/ML (ref 4–24)
HCT VFR BLD AUTO: 33.3 % (ref 37–48.5)
HGB BLD-MCNC: 10.2 G/DL (ref 12–16)
IMM GRANULOCYTES # BLD AUTO: 0.07 K/UL (ref 0–0.04)
IMM GRANULOCYTES NFR BLD AUTO: 0.5 % (ref 0–0.5)
IRON SERPL-MCNC: 47 UG/DL (ref 30–160)
LYMPHOCYTES # BLD AUTO: 2.5 K/UL (ref 1–4.8)
LYMPHOCYTES NFR BLD: 19.4 % (ref 18–48)
MCH RBC QN AUTO: 28 PG (ref 27–31)
MCHC RBC AUTO-ENTMCNC: 30.6 G/DL (ref 32–36)
MCV RBC AUTO: 92 FL (ref 82–98)
MONOCYTES # BLD AUTO: 0.7 K/UL (ref 0.3–1)
MONOCYTES NFR BLD: 5.3 % (ref 4–15)
NEUTROPHILS # BLD AUTO: 9 K/UL (ref 1.8–7.7)
NEUTROPHILS NFR BLD: 70.8 % (ref 38–73)
NRBC BLD-RTO: 0 /100 WBC
PLATELET # BLD AUTO: 389 K/UL (ref 150–450)
PMV BLD AUTO: 10.6 FL (ref 9.2–12.9)
RBC # BLD AUTO: 3.64 M/UL (ref 4–5.4)
RETICS/RBC NFR AUTO: 1.9 % (ref 0.5–2.5)
SATURATED IRON: 15 % (ref 20–50)
TOTAL IRON BINDING CAPACITY: 311 UG/DL (ref 250–450)
TRANSFERRIN SERPL-MCNC: 210 MG/DL (ref 200–375)
VIT B12 SERPL-MCNC: 807 PG/ML (ref 210–950)
WBC # BLD AUTO: 12.78 K/UL (ref 3.9–12.7)

## 2022-01-05 PROCEDURE — 82607 VITAMIN B-12: CPT | Performed by: FAMILY MEDICINE

## 2022-01-05 PROCEDURE — 85045 AUTOMATED RETICULOCYTE COUNT: CPT | Performed by: FAMILY MEDICINE

## 2022-01-05 PROCEDURE — 85025 COMPLETE CBC W/AUTO DIFF WBC: CPT | Performed by: FAMILY MEDICINE

## 2022-01-05 PROCEDURE — 36415 COLL VENOUS BLD VENIPUNCTURE: CPT | Performed by: FAMILY MEDICINE

## 2022-01-05 PROCEDURE — 84466 ASSAY OF TRANSFERRIN: CPT | Performed by: FAMILY MEDICINE

## 2022-01-05 PROCEDURE — 82728 ASSAY OF FERRITIN: CPT | Performed by: FAMILY MEDICINE

## 2022-01-05 PROCEDURE — 82746 ASSAY OF FOLIC ACID SERUM: CPT | Performed by: FAMILY MEDICINE

## 2022-01-14 ENCOUNTER — HOSPITAL ENCOUNTER (OUTPATIENT)
Dept: RADIOLOGY | Facility: HOSPITAL | Age: 62
Discharge: HOME | End: 2022-01-14
Attending: FAMILY MEDICINE
Payer: COMMERCIAL

## 2022-01-14 VITALS — BODY MASS INDEX: 30.73 KG/M2 | HEIGHT: 64 IN | WEIGHT: 180 LBS

## 2022-01-14 DIAGNOSIS — Z12.31 ENCOUNTER FOR SCREENING MAMMOGRAM FOR BREAST CANCER: ICD-10-CM

## 2022-01-14 PROCEDURE — 77063 MAMMO DIGITAL SCREENING BILAT WITH TOMO: ICD-10-PCS | Mod: 26,,, | Performed by: RADIOLOGY

## 2022-01-14 PROCEDURE — 77067 SCR MAMMO BI INCL CAD: CPT | Mod: TC

## 2022-01-14 PROCEDURE — 77063 BREAST TOMOSYNTHESIS BI: CPT | Mod: 26,,, | Performed by: RADIOLOGY

## 2022-01-14 PROCEDURE — 77067 SCR MAMMO BI INCL CAD: CPT | Mod: 26,,, | Performed by: RADIOLOGY

## 2022-01-14 PROCEDURE — 77067 MAMMO DIGITAL SCREENING BILAT WITH TOMO: ICD-10-PCS | Mod: 26,,, | Performed by: RADIOLOGY

## 2022-01-19 ENCOUNTER — HOSPITAL ENCOUNTER (OUTPATIENT)
Dept: RADIOLOGY | Facility: HOSPITAL | Age: 62
Discharge: HOME | End: 2022-01-19
Attending: ORTHOPAEDIC SURGERY
Payer: COMMERCIAL

## 2022-01-19 ENCOUNTER — OFFICE VISIT (OUTPATIENT)
Dept: ORTHOPEDICS | Facility: CLINIC | Age: 62
End: 2022-01-19
Payer: COMMERCIAL

## 2022-01-19 VITALS — HEIGHT: 64 IN | BODY MASS INDEX: 31.57 KG/M2 | WEIGHT: 184.94 LBS

## 2022-01-19 DIAGNOSIS — G95.9 CERVICAL MYELOPATHY: ICD-10-CM

## 2022-01-19 DIAGNOSIS — Z98.1 S/P CERVICAL SPINAL FUSION: ICD-10-CM

## 2022-01-19 PROCEDURE — 72040 XR CERVICAL SPINE AP LATERAL: ICD-10-PCS | Mod: 26,,, | Performed by: RADIOLOGY

## 2022-01-19 PROCEDURE — 1159F MED LIST DOCD IN RCRD: CPT | Mod: CPTII,S$GLB,, | Performed by: ORTHOPAEDIC SURGERY

## 2022-01-19 PROCEDURE — 3008F BODY MASS INDEX DOCD: CPT | Mod: CPTII,S$GLB,, | Performed by: ORTHOPAEDIC SURGERY

## 2022-01-19 PROCEDURE — 1159F PR MEDICATION LIST DOCUMENTED IN MEDICAL RECORD: ICD-10-PCS | Mod: CPTII,S$GLB,, | Performed by: ORTHOPAEDIC SURGERY

## 2022-01-19 PROCEDURE — 72040 X-RAY EXAM NECK SPINE 2-3 VW: CPT | Mod: 26,,, | Performed by: RADIOLOGY

## 2022-01-19 PROCEDURE — 72040 X-RAY EXAM NECK SPINE 2-3 VW: CPT | Mod: TC

## 2022-01-19 PROCEDURE — 99213 OFFICE O/P EST LOW 20 MIN: CPT | Mod: S$GLB,,, | Performed by: ORTHOPAEDIC SURGERY

## 2022-01-19 PROCEDURE — 3008F PR BODY MASS INDEX (BMI) DOCUMENTED: ICD-10-PCS | Mod: CPTII,S$GLB,, | Performed by: ORTHOPAEDIC SURGERY

## 2022-01-19 PROCEDURE — 99999 PR PBB SHADOW E&M-EST. PATIENT-LVL III: CPT | Mod: PBBFAC,,, | Performed by: ORTHOPAEDIC SURGERY

## 2022-01-19 PROCEDURE — 99999 PR PBB SHADOW E&M-EST. PATIENT-LVL III: ICD-10-PCS | Mod: PBBFAC,,, | Performed by: ORTHOPAEDIC SURGERY

## 2022-01-19 PROCEDURE — 99213 PR OFFICE/OUTPT VISIT, EST, LEVL III, 20-29 MIN: ICD-10-PCS | Mod: S$GLB,,, | Performed by: ORTHOPAEDIC SURGERY

## 2022-01-25 ENCOUNTER — HOSPITAL ENCOUNTER (OUTPATIENT)
Dept: RADIOLOGY | Facility: HOSPITAL | Age: 62
Discharge: HOME | End: 2022-01-25
Attending: FAMILY MEDICINE
Payer: COMMERCIAL

## 2022-01-25 DIAGNOSIS — R92.8 ABNORMAL MAMMOGRAM: ICD-10-CM

## 2022-01-25 PROCEDURE — 77065 MAMMO DIGITAL DIAGNOSTIC LEFT WITH TOMO: ICD-10-PCS | Mod: 26,LT,, | Performed by: RADIOLOGY

## 2022-01-25 PROCEDURE — 77065 DX MAMMO INCL CAD UNI: CPT | Mod: 26,LT,, | Performed by: RADIOLOGY

## 2022-01-25 PROCEDURE — 77061 MAMMO DIGITAL DIAGNOSTIC LEFT WITH TOMO: ICD-10-PCS | Mod: 26,LT,, | Performed by: RADIOLOGY

## 2022-01-25 PROCEDURE — 77061 BREAST TOMOSYNTHESIS UNI: CPT | Mod: 26,LT,, | Performed by: RADIOLOGY

## 2022-01-25 PROCEDURE — 76642 US BREAST LEFT LIMITED: ICD-10-PCS | Mod: 26,LT,, | Performed by: RADIOLOGY

## 2022-01-25 PROCEDURE — 76642 ULTRASOUND BREAST LIMITED: CPT | Mod: TC,LT

## 2022-01-25 PROCEDURE — 76642 ULTRASOUND BREAST LIMITED: CPT | Mod: 26,LT,, | Performed by: RADIOLOGY

## 2022-01-25 PROCEDURE — 77065 DX MAMMO INCL CAD UNI: CPT | Mod: TC,LT

## 2022-01-25 NOTE — PROGRESS NOTES
Date of Surgery: 11/19/2019    Procedure: C3-6 posterior laminectomy and fusion for cervical spondylitic myelopathy with acute progression    History: Lala Saxena is seen today for follow-up following the above listed procedure.  She has good and bad days.    Exam:  Incision is healedl.   There is no sign of infection. Neuro exam is stable. No signs of DVT.    Radiographs: imaging today shows hardware in place, no evidence of failure.     Assessment/Plan:  2 years postop, doing well.  At this point patient does not require further follow-up.  I will see her back p.r.n. basis.

## 2022-02-24 ENCOUNTER — PROCEDURE VISIT (OUTPATIENT)
Dept: OPHTHALMOLOGY | Facility: CLINIC | Age: 62
End: 2022-02-24
Payer: COMMERCIAL

## 2022-02-24 DIAGNOSIS — E11.3511 TYPE 2 DIABETES MELLITUS WITH RIGHT EYE AFFECTED BY PROLIFERATIVE RETINOPATHY AND MACULAR EDEMA, WITHOUT LONG-TERM CURRENT USE OF INSULIN: Primary | ICD-10-CM

## 2022-02-24 DIAGNOSIS — E11.3412 TYPE 2 DIABETES MELLITUS WITH LEFT EYE AFFECTED BY SEVERE NONPROLIFERATIVE RETINOPATHY AND MACULAR EDEMA, WITHOUT LONG-TERM CURRENT USE OF INSULIN: ICD-10-CM

## 2022-02-24 DIAGNOSIS — E11.36 DIABETIC CATARACT OF BOTH EYES: ICD-10-CM

## 2022-02-24 PROCEDURE — 67028 INJECTION EYE DRUG: CPT | Mod: RT,S$GLB,, | Performed by: OPHTHALMOLOGY

## 2022-02-24 PROCEDURE — 92134 CPTRZ OPH DX IMG PST SGM RTA: CPT | Mod: S$GLB,,, | Performed by: OPHTHALMOLOGY

## 2022-02-24 PROCEDURE — 67028 PR INJECT INTRAVITREAL PHARMCOLOGIC: ICD-10-PCS | Mod: RT,S$GLB,, | Performed by: OPHTHALMOLOGY

## 2022-02-24 PROCEDURE — 92134 POSTERIOR SEGMENT OCT RETINA (OCULAR COHERENCE TOMOGRAPHY)-BOTH EYES: ICD-10-PCS | Mod: S$GLB,,, | Performed by: OPHTHALMOLOGY

## 2022-02-24 PROCEDURE — 99214 OFFICE O/P EST MOD 30 MIN: CPT | Mod: 25,S$GLB,, | Performed by: OPHTHALMOLOGY

## 2022-02-24 PROCEDURE — 99214 PR OFFICE/OUTPT VISIT, EST, LEVL IV, 30-39 MIN: ICD-10-PCS | Mod: 25,S$GLB,, | Performed by: OPHTHALMOLOGY

## 2022-02-24 RX ADMIN — Medication 1.25 MG: at 01:02

## 2022-02-24 NOTE — PATIENT INSTRUCTIONS

## 2022-02-24 NOTE — PROGRESS NOTES
Subjective:       Patient ID: Lala Saxena is a 61 y.o. female      Chief Complaint   Patient presents with    Diabetic Eye Exam     Lost to f/u since April 2021     History of Present Illness  HPI     Diabetic Eye Exam      Additional comments: Lost to f/u since April 2021              Comments     DLS 04/16/2021  by Dr. TOSIN Zimmerman MD      CC: Pt c/o seeing floaters OD and decrease vision  x 1 mo     --eye pain    --diplopia  ++floaters w/o flashes/   -- headaches  ++Blurred VA    Gtts   At's PRN     Ocular hx   Moderated NPDR w/ ME OU  Diabetic cataracts OU   Hx of Avasin injection OU   S/P Avastin OD (04/16/2021)         Avastin OS ( 0401/2021)            Last edited by Rahul Zimmerman MD on 2/24/2022 12:22 PM. (History)        Imaging:    See report    Assessment/Plan:     1. 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  DR level progressed  ME persists    - OCT- Retina  - Prior authorization Order    2. 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  DR level progressed    Sig exudate OU  Recommend restart monthly Av OU.  Will need PRP OD    Diabetic Retinopathy discussed in detail, all questions answered  Stressed importance of good BS/BP/Chol Control  RTC immediately PRN any vision changes, becca blurry vision, missing vision, floaters, distortions, etc        3. Diabetic cataract of both eyes  Observe for now    Follow up in about 1 week (around 3/3/2022), or if symptoms worsen or fail to improve, for Injection Left eye, Avastin.     Patient identified.  Timeout performed.    Risks, benefits, and alternatives to treatment were discussed in detail with the patient, including bleeding/infection (endophthalmitis)/etc.  The patient voiced understanding and wished to proceed  with the procedure.  See separate consent form.    Injection Procedure Note:  Diagnosis: CSME Right Eye    Topical Proparacaine drop placed then topical 5% Betadine  Sterile gloves used, and sterile lid speculum placed.  5% Betadine placed at injection site again prior to injection.  Avastin 1.25mg in 0.05cc Injected inferotemporally 3.5-4mm posterior to the limbus.  Complications: None  Va at least CF at 5 feet post injection.  Retina, ONH, IOP normal after injection.    Followup as above.  Patient should return immediately PRN.  Retinal Detachment and Endophthalmitis precautions given.

## 2022-03-14 ENCOUNTER — PROCEDURE VISIT (OUTPATIENT)
Dept: OPHTHALMOLOGY | Facility: CLINIC | Age: 62
End: 2022-03-14
Payer: COMMERCIAL

## 2022-03-14 DIAGNOSIS — E11.3412: Primary | ICD-10-CM

## 2022-03-14 PROCEDURE — 92012 INTRM OPH EXAM EST PATIENT: CPT | Mod: 25,S$GLB,, | Performed by: OPHTHALMOLOGY

## 2022-03-14 PROCEDURE — 92012 PR EYE EXAM, EST PATIENT,INTERMED: ICD-10-PCS | Mod: 25,S$GLB,, | Performed by: OPHTHALMOLOGY

## 2022-03-14 PROCEDURE — 92134 POSTERIOR SEGMENT OCT RETINA (OCULAR COHERENCE TOMOGRAPHY)-BOTH EYES: ICD-10-PCS | Mod: S$GLB,,, | Performed by: OPHTHALMOLOGY

## 2022-03-14 PROCEDURE — 67028 INJECTION EYE DRUG: CPT | Mod: LT,S$GLB,, | Performed by: OPHTHALMOLOGY

## 2022-03-14 PROCEDURE — 67028 PR INJECT INTRAVITREAL PHARMCOLOGIC: ICD-10-PCS | Mod: LT,S$GLB,, | Performed by: OPHTHALMOLOGY

## 2022-03-14 PROCEDURE — 92134 CPTRZ OPH DX IMG PST SGM RTA: CPT | Mod: S$GLB,,, | Performed by: OPHTHALMOLOGY

## 2022-03-14 RX ADMIN — Medication 1.25 MG: at 11:03

## 2022-03-14 NOTE — PROGRESS NOTES
HPI     Dls 2/24/2022    Floater OD. No flashes. VA stable . No pain.    AT ou prn    Last edited by Karlene Kay on 3/14/2022  9:48 AM. (History)          Imaging: OD with VH and paracentral ME  OS central ME      See report    Assessment/Plan:     1. 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  DR level progressed  S/p Avastin OD x 1 with Dr. MACIEL 2/24/22 - some improvement in VH    .  Will need PRP OD      2. 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  DR level progressed    DME OU  Avastin OS today    Get approval for Ozurdex        Diabetic Retinopathy discussed in detail, all questions answered  Stressed importance of good BS/BP/Chol Control  RTC immediately PRN any vision changes, becca blurry vision, missing vision, floaters, distortions, etc        3. Diabetic cataract of both eyes  Observe for now    3-4 weeks OCT with Dr. MACIEL    Patient identified.  Timeout performed.    Risks, benefits, and alternatives to treatment were discussed in detail with the patient, including bleeding/infection (endophthalmitis)/etc.  The patient voiced understanding and wished to proceed with the procedure.  See separate consent form.    Injection Procedure Note:  Diagnosis: CSME  Left Eye    Topical Proparacaine drop placed then topical 5% Betadine  Sterile gloves used, and sterile lid speculum placed.  5% Betadine placed at injection site again prior to injection.  Avastin 1.25mg in 0.05cc Injected inferotemporally 3.5-4mm posterior to the limbus.  Complications: None  Va at least CF at 5 feet post injection.  Retina, ONH, IOP normal after injection.    Followup as above.  Patient should return immediately PRN.  Retinal Detachment and Endophthalmitis precautions given.

## 2022-03-14 NOTE — PATIENT INSTRUCTIONS

## 2022-03-16 DIAGNOSIS — E11.42 TYPE 2 DIABETES MELLITUS WITH DIABETIC POLYNEUROPATHY, WITHOUT LONG-TERM CURRENT USE OF INSULIN: ICD-10-CM

## 2022-03-16 DIAGNOSIS — E66.9 TYPE 2 DIABETES MELLITUS WITH OBESITY: ICD-10-CM

## 2022-03-16 DIAGNOSIS — E11.3393 TYPE 2 DIABETES MELLITUS WITH MODERATE NONPROLIFERATIVE RETINOPATHY OF BOTH EYES, WITHOUT LONG-TERM CURRENT USE OF INSULIN, MACULAR EDEMA PRESENCE UNSPECIFIED: ICD-10-CM

## 2022-03-16 DIAGNOSIS — E11.69 TYPE 2 DIABETES MELLITUS WITH OBESITY: ICD-10-CM

## 2022-03-16 NOTE — TELEPHONE ENCOUNTER
----- Message from Davida Arthur sent at 3/16/2022 10:58 AM CDT -----  Regarding: refill request  Contact: patient [phone]  Requesting an RX refill or new RX.  Is this a refill or new RX: refill  RX name and strength blood sugar diagnostic Strp  Is this a 30 day or 90 day RX: 90  Pharmacy name and phone # (  [facility] [address] [place], [place] - [address]  [address] [place] [address]  Phone: [phone] Fax: [phone]  [facility] [address] [facility] [address] [facility]  [address] [facility] [address]  Phone: [phone] Fax: [phone]    The doctors have asked that we provide their patients with the following 2 reminders -- prescription refills can take up to 72 hours, and a friendly reminder that in the future you can use your [facility] account to request refills:yes

## 2022-03-16 NOTE — TELEPHONE ENCOUNTER
Care Due:                  Date            Visit Type   Department     Provider  --------------------------------------------------------------------------------                                EP -                              PRIMARY      [facility] INTERNAL  Last Visit: 12-      CARE ([facility])   MEDICINE       Edward Hernandez                               -                              PRIMARY      [facility] INTERNAL  Next Visit: 06-      CARE ([facility])   MEDICINE       Edward Hernandez                                                            Last  Test          Frequency    Reason                     Performed    Due Date  --------------------------------------------------------------------------------    HBA1C.......  6 months...  glimepiride, metFORMIN...  12- 06-    Powered by [employer]. Reference number: [id number].   3/16/2022 11:03:49 AM CDT

## 2022-04-04 ENCOUNTER — TELEPHONE (OUTPATIENT)
Dept: OPHTHALMOLOGY | Facility: CLINIC | Age: 62
End: 2022-04-04
Payer: COMMERCIAL

## 2022-04-08 ENCOUNTER — TELEPHONE (OUTPATIENT)
Dept: OPHTHALMOLOGY | Facility: CLINIC | Age: 62
End: 2022-04-08
Payer: COMMERCIAL

## 2022-04-21 ENCOUNTER — PROCEDURE VISIT (OUTPATIENT)
Dept: OPHTHALMOLOGY | Facility: CLINIC | Age: 62
End: 2022-04-21
Payer: COMMERCIAL

## 2022-04-21 DIAGNOSIS — E11.3511 TYPE 2 DIABETES MELLITUS WITH RIGHT EYE AFFECTED BY PROLIFERATIVE RETINOPATHY AND MACULAR EDEMA, WITHOUT LONG-TERM CURRENT USE OF INSULIN: Primary | ICD-10-CM

## 2022-04-21 DIAGNOSIS — E11.3412 TYPE 2 DIABETES MELLITUS WITH LEFT EYE AFFECTED BY SEVERE NONPROLIFERATIVE RETINOPATHY AND MACULAR EDEMA, WITHOUT LONG-TERM CURRENT USE OF INSULIN: ICD-10-CM

## 2022-04-21 DIAGNOSIS — E11.3412: ICD-10-CM

## 2022-04-21 PROCEDURE — 67028 PR INJECT INTRAVITREAL PHARMCOLOGIC: ICD-10-PCS | Mod: RT,S$GLB,, | Performed by: OPHTHALMOLOGY

## 2022-04-21 PROCEDURE — 67028 INJECTION EYE DRUG: CPT | Mod: RT,S$GLB,, | Performed by: OPHTHALMOLOGY

## 2022-04-21 PROCEDURE — 99499 NO LOS: ICD-10-PCS | Mod: S$GLB,,, | Performed by: OPHTHALMOLOGY

## 2022-04-21 PROCEDURE — 99499 UNLISTED E&M SERVICE: CPT | Mod: S$GLB,,, | Performed by: OPHTHALMOLOGY

## 2022-04-21 PROCEDURE — 92134 CPTRZ OPH DX IMG PST SGM RTA: CPT | Mod: S$GLB,,, | Performed by: OPHTHALMOLOGY

## 2022-04-21 PROCEDURE — 92134 POSTERIOR SEGMENT OCT RETINA (OCULAR COHERENCE TOMOGRAPHY)-BOTH EYES: ICD-10-PCS | Mod: S$GLB,,, | Performed by: OPHTHALMOLOGY

## 2022-04-21 RX ADMIN — Medication 1.25 MG: at 05:04

## 2022-04-22 NOTE — PROGRESS NOTES
Subjective:       Patient ID: Lala Saxena is a 62 y.o. female      No chief complaint on file.    History of Present Illness  HPI     DLS 03/14/2022 by Dr. TOSIN Zimmerman MD      CC: Pt states vision seems a little blurry OD and good OS.  No f/f/pain OU     --eye pain    --diplopia  floaters w/o flashes/   -- headaches  Blurred VA    Gtts   At's PRN     Ocular hx   Moderated NPDR w/ ME OU  Diabetic cataracts OU   Hx of Avasin injection OU   S/P Avastin OD (04/16/2021)         Avastin OS ( 0401/2021)      Last edited by Rahul Zimmerman MD on 4/22/2022 10:30 AM. (History)        Imaging:    See report    Assessment/Plan:     1. 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  DR level progressed  ME improved but persists    - OCT- Retina  - Prior authorization Order    2. 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  DR level progressed    Sig exudate OU  Recommend restart monthly Av OU.  Will need PRP OD  Pt only wants one eye at a time.  Inj OD today and OS in 2 wks    Diabetic Retinopathy discussed in detail, all questions answered  Stressed importance of good BS/BP/Chol Control  RTC immediately PRN any vision changes, becca blurry vision, missing vision, floaters, distortions, etc    3. Diabetic cataract of both eyes  Observe for now    Follow up in about 2 weeks (around 5/5/2022), or if symptoms worsen or fail to improve, for Injection Left eye, Avastin.     Patient identified.  Timeout performed.    Risks, benefits, and alternatives to treatment were discussed in detail with the patient, including bleeding/infection (endophthalmitis)/etc.  The patient voiced understanding and wished to proceed with the procedure.  See separate consent form.    Injection Procedure  Note:  Diagnosis: CSME Right Eye    Topical Proparacaine drop placed then topical 5% Betadine  Sterile gloves used, and sterile lid speculum placed.  5% Betadine placed at injection site again prior to injection.  Avastin 1.25mg in 0.05cc Injected inferotemporally 3.5-4mm posterior to the limbus.  Complications: None  Va at least CF at 5 feet post injection.  Retina, ONH, IOP normal after injection.    Followup as above.  Patient should return immediately PRN.  Retinal Detachment and Endophthalmitis precautions given.

## 2022-06-22 DIAGNOSIS — E11.9 TYPE 2 DIABETES MELLITUS WITHOUT COMPLICATION: ICD-10-CM

## 2022-12-19 DIAGNOSIS — E03.4 HYPOTHYROIDISM DUE TO ACQUIRED ATROPHY OF THYROID: ICD-10-CM

## 2022-12-19 RX ORDER — LEVOTHYROXINE SODIUM 50 UG/1
50 TABLET ORAL
Qty: 90 TABLET | Refills: 3 | Status: SHIPPED | OUTPATIENT
Start: 2022-12-19

## 2022-12-19 NOTE — TELEPHONE ENCOUNTER
No new care gaps identified.  [facility] Embedded Care Gaps. Reference number: [id number]. 12/19/2022   11:32:24 AM CST

## 2022-12-19 NOTE — TELEPHONE ENCOUNTER
----- Message from Marlee Altamirano sent at 12/19/2022 10:52 AM CST -----  Contact: [phone] Patient  PHARMACY CHANGE  Please send to the [facility] below and not the one in [place]. Pt is out of town for a while    Requesting an RX refill or new RX.  Is this a refill or new RX: new  RX name and strength (copy/paste from chart):  levothyroxine (SYNTHROID) 50 MCG tablet  Is this a 30 day or 90 day RX:   Pharmacy name and phone # (copy/paste from chart):    [facility] [address] [place] [address].[address].[facility] [address] [facility] [address]  Phone: [phone] Fax: [phone]

## 2023-05-01 ENCOUNTER — TELEPHONE (OUTPATIENT)
Dept: INTERNAL MEDICINE | Facility: CLINIC | Age: 63
End: 2023-05-01
Payer: COMMERCIAL

## 2023-05-01 NOTE — TELEPHONE ENCOUNTER
Pt stated that her  has retired and she has been taken off of his insurance, that is why she has not been coming in. They told her she would have to wait until July, so in July she will contact us to come in for her annual.       Just TIMBO

## 2023-05-01 NOTE — TELEPHONE ENCOUNTER
----- Message from Monica Foster sent at 5/1/2023 12:47 PM CDT -----  Contact: Lala matos [phone]  1MEDICALADVICE     Patient is calling for Medical Advice regarding:    How long has patient had these symptoms:    Pharmacy name and phone#:    Would like response via ReTenanthart: call back    Comments: Pt is requesting a call back from the nurse regarding her situation

## 2023-05-19 DIAGNOSIS — E11.40 TYPE 2 DIABETES MELLITUS WITH DIABETIC NEUROPATHY, WITHOUT LONG-TERM CURRENT USE OF INSULIN: ICD-10-CM

## 2023-05-19 DIAGNOSIS — I10 ESSENTIAL HYPERTENSION: ICD-10-CM

## 2023-05-19 DIAGNOSIS — E11.42 TYPE 2 DIABETES MELLITUS WITH DIABETIC POLYNEUROPATHY, WITHOUT LONG-TERM CURRENT USE OF INSULIN: ICD-10-CM

## 2023-05-19 DIAGNOSIS — E11.3393 TYPE 2 DIABETES MELLITUS WITH MODERATE NONPROLIFERATIVE RETINOPATHY OF BOTH EYES, WITHOUT LONG-TERM CURRENT USE OF INSULIN, MACULAR EDEMA PRESENCE UNSPECIFIED: ICD-10-CM

## 2023-05-19 RX ORDER — METFORMIN HYDROCHLORIDE 1000 MG/1
1000 TABLET ORAL 2 TIMES DAILY WITH MEALS
Qty: 180 TABLET | Refills: 0 | Status: SHIPPED | OUTPATIENT
Start: 2023-05-19

## 2023-05-19 RX ORDER — GLIMEPIRIDE 4 MG/1
4 TABLET ORAL DAILY
Qty: 90 TABLET | Refills: 0 | Status: SHIPPED | OUTPATIENT
Start: 2023-05-19

## 2023-05-19 NOTE — TELEPHONE ENCOUNTER
Care Due:                  Date            Visit Type   Department     Provider  --------------------------------------------------------------------------------                                EP -                              PRIMARY      [facility] INTERNAL  Last Visit: 12-      [facility]   MEDICINE       Edward Hernandez  Next Visit: None Scheduled  None         None Found                                                            Last  Test          Frequency    Reason                     Performed    Due Date  --------------------------------------------------------------------------------    Office Visit  12 months..  amLODIPine, atorvastatin,   12- 12-                             glimepiride,                             levothyroxine, losartan,                             metFORMIN................    CMP.........  12 months..  atorvastatin,              12- 12-                             glimepiride, losartan,                             metFORMIN................    HBA1C.......  6 months...  glimepiride, metFORMIN...  12- 06-    Lipid Panel.  12 months..  atorvastatin.............  12- 12-    TSH.........  12 months..  levothyroxine............  12- 12-    [facility] Embedded Care Due Messages. Reference number: [id number].   5/19/2023 11:20:20 AM CDT

## 2023-05-19 NOTE — TELEPHONE ENCOUNTER
----- Message from Milagro Ambriz sent at 5/19/2023  3:24 PM CDT -----  Contact: Pt [phone]  Requesting an RX refill or new RX.  Is this a refill or new RX: Refill  RX name and strength losartan (COZAAR) 50 MG tablet  Is this a 30 day or 90 day RX:   Pharmacy name and phone # [facility] [address] [place] [address].[address]   Phone:  [phone] Fax:  [phone]

## 2023-05-19 NOTE — TELEPHONE ENCOUNTER
Refill Routing Note   Medication(s) are not appropriate for processing by [facility] for the following reason(s):      Patient not seen by PCP within 15 months  Required labs outdated  Required vitals outdated    [facility] action(s):  Defer Care Due:  None identified          Appointments  past 12m or future 3m with PCP    Date Provider   Last Visit   12/7/2021 Edward Hernandez MD   Next Visit   Visit date not found Edward Hernandez MD   ED visits in past 90 days: 0        Note composed:3:39 PM 05/19/2023

## 2023-05-19 NOTE — TELEPHONE ENCOUNTER
I called pt to let her know that you are out of the office at the moment. She was under the impression that her insurance had canceled, but it does not end until the 30th of this month. Then some time in July she will be added to her husbands insurance.     Pt is not in [facility] at the moment. They are in [place]. While they were there visiting he had a stroke and they have been there since.     Wants to know if you will still refill her medications? I told her I would send to Dr. Foster to see if she would do it.     Has not seen Dr. Hernandez since 2021

## 2023-05-19 NOTE — TELEPHONE ENCOUNTER
No care due was identified.  [facility] Embedded Care Due Messages. Reference number: [id number].   5/19/2023 3:29:59 PM CDT

## 2023-05-22 RX ORDER — LOSARTAN POTASSIUM 50 MG/1
50 TABLET ORAL DAILY
Qty: 90 TABLET | Refills: 0 | Status: SHIPPED | OUTPATIENT
Start: 2023-05-22

## 2023-10-03 ENCOUNTER — TELEPHONE (OUTPATIENT)
Dept: INTERNAL MEDICINE | Facility: CLINIC | Age: 63
End: 2023-10-03
Payer: COMMERCIAL

## 2023-11-01 ENCOUNTER — TELEPHONE (OUTPATIENT)
Dept: INTERNAL MEDICINE | Facility: CLINIC | Age: 63
End: 2023-11-01
Payer: COMMERCIAL

## 2023-11-01 NOTE — TELEPHONE ENCOUNTER
----- Message from Yi Smyth sent at 11/1/2023 10:32 AM CDT -----  Regarding: Annual  LVM with call back number for scheduling annual appointment

## 2024-02-28 NOTE — TELEPHONE ENCOUNTER
----- Message from Estefania Apple sent at 10/23/2019  1:29 PM CDT -----  Contact: patient  Type: Needs Medical Advice    Who Called:  patient  Additional Information: requesting a call back from the nurse in regards to information that was given to her. Transferred call to Nusrat     Pt ambulated to room Ye [address] with steady gait. Pt placed on monitors. Chart up for ERP.

## 2024-09-24 ENCOUNTER — TELEPHONE (OUTPATIENT)
Dept: INTERNAL MEDICINE | Facility: CLINIC | Age: 64
End: 2024-09-24
Payer: COMMERCIAL

## 2024-09-24 NOTE — TELEPHONE ENCOUNTER
----- Message from Edward Hernandez MD sent at 9/19/2024  2:28 PM CDT -----  Reach out about scheduling appt. Has been about 3 years since last visit

## 2025-05-15 NOTE — CONSULTS
[facility]  Orthopedics  Consult Note    Patient Name: Lala Saxena  MRN: [id number]  Admission Date: 11/11/2019  Hospital Length of Stay: 0 days  Attending Provider: No att. providers found  Primary Care Provider: Edward Hernandez MD    Patient information was obtained from patient, relative(s) and ER records.     Consults  Subjective:     Principal Problem:<principal problem not specified>    Chief Complaint:   Chief Complaint   Patient presents with    Abnormal Ct Scan     send for further eval after CT and MRI for c-spine        HPI: Patient is a 59-year-old female w/ pmh HTN, HLD, type 2 DM who presents the ED with complaint of gait abnormalities after told to present to ER due to MRI results obtained by neurology clinic. Patient states 1.5 month history of gait imbalance, hand clumsiness, and hand weakness. She reports feeling like she does not have control of her legs when she walks. She reports funny feeling in bilateral hands which is hard for her to describe but denies numbness in upper or lower extremities. She reports weakness in fingers of left hand worse than right but no other georgi weakness in bilateral upper extremities. She does consider her balance difficulties as weakness. She reports bladder incontinence over the last several weeks which is new for her. No bowel incontinence. She denies perianal paresthesias. She has been seeing neurology as an outpatient for these symptoms who obtained MRI of brain and c-spine and referred to ER after results read showing significant cord compression at C3-4. She was fully ambulatory at baseline before these symptoms began and does not take any blood thinners.      Past Medical History:   Diagnosis Date    Diabetes mellitus, type 2     Hyperlipidemia     Hypertension     Thyroid disease        History reviewed. No pertinent surgical history.    Review of patient's allergies indicates:  No Known Allergies    No current  Reviewed discharge instruction, verbalized understanding. No questions or concerns. IV removed. Meds reviewed. VS reassessed.      "facility-administered medications for this encounter.      Current Outpatient Medications   Medication Sig    amLODIPine (NORVASC) 5 MG tablet Take 5 mg by mouth.    atorvastatin (LIPITOR) 10 MG tablet Take 10 mg by mouth once daily.    glimepiride (AMARYL) 4 MG tablet Take 4 mg by mouth daily with breakfast.     levothyroxine (SYNTHROID) 50 MCG tablet Take 50 mcg by mouth once daily.    losartan (COZAAR) 25 MG tablet Take 30 mg by mouth once daily.    metFORMIN (GLUCOPHAGE) 1000 MG tablet Take 1,000 mg by mouth 2 (two) times daily with meals.     methylPREDNISolone (MEDROL DOSEPACK) 4 mg tablet Take pack as directed.     Family History     None        Tobacco Use    Smoking status: Never Smoker    Smokeless tobacco: Never Used   Substance and Sexual Activity    Alcohol use: Not Currently    Drug use: Not on file    Sexual activity: Not on file     ROS See ER Provider ROS    Objective:     Vital Signs (Most Recent):  Temp: 98.2 °F (36.8 °C) (11/11/19 1431)  Pulse: 88 (11/11/19 1431)  Resp: 16 (11/11/19 1431)  BP: (!) 220/92(took bp meds) (11/11/19 1431)  SpO2: 99 % (11/11/19 1431) Vital Signs (24h Range):  Temp:  [98.2 °F (36.8 °C)] 98.2 °F (36.8 °C)  Pulse:  [88] 88  Resp:  [16] 16  SpO2:  [99 %] 99 %  BP: (220)/(92) 220/92     Weight: 81.2 kg (179 lb)  Height: 5' 4" (162.6 cm)  Body mass index is 30.73 kg/m².    No intake or output data in the 24 hours ending 11/12/19 0040    Ortho/SPM Exam    PHYSICAL EXAM:  Awake/Alert/Oriented x3, No acute distress, Afebrile, Vital signs stable  Normocephalic, atraumatic  Palpable distal pulses  Good inspiratory effort with unlaboured breathing    No skin breakdown or lesions over spine  Non TTP over spinous processes with no palpable step offs  Non painful ROM of neck      Motor            RIGHT  LEFT  Deltoid(C5)        5/5    5/5  Biceps(C5)         5/5    5/5  Extensor Carpi Radialis Longus(C6)    5/5    5/5   Triceps(C7)       5/5    5/5     Flexor Carpi " Radialis(C7)     5/5    5/5  Flexor Digitorum Superficialis(C8)    5/5    5/5  Interossei(T1)       3/5    3/5     Sensation (a=absent, i=impaired, n=normal)       RIGHT  LEFT  C5 dermatome       n     n  C6 dermatome       n     n  C7 dermatome       n     n  C8 dermatome       n     n  T1 dermatome       n     n    Reflexes       RIGHT  LEFT  Triceps        1+     1+  Biceps         2+     2+  Brachioradialis       2+           2+  Hoffmans's       neg   pos      Motor             RIGHT  LEFT  Iliopsoas (L2,3)       5/5    5/5  Quadriceps (L3,4)      5/5    5/5   Tibialis Anterior (L4.5)         5/5    5/5   Extensor Halucis Longus (L5)    5/5    5/5     Gastrocnemius/Soleus (S1)     5/5    5/5  Flexor Hallucis Longus(S2)     5/5    5/5    Sensation (a=absent, i=impaired, n=normal)       RIGHT   LEFT  L2 dermatome       n     n  L3 dermatome       n     n  L4 dermatome       n     n  L5 dermatome       n     n  S1 dermatome       n     n  S2 dermatome       n     n    Reflexes        RIGHT  LEFT  Patellar       2+     2+  Achilles       2+     2+  Babinski      neg    neg  Clonus          neg    neg        Significant Labs:   CBC:   Recent Labs   Lab 11/11/19  1529   WBC 13.05*   HGB 11.4*   HCT 35.6*   *     All pertinent labs within the past 24 hours have been reviewed.    Significant Imaging: I have reviewed and interpreted all pertinent imaging results/findings.     MRI C spine shows severe cord compression and myelomalacia at C3-4 vertebral level  XR C spine and CT C spine show multilevel degenerative changes with no fx or dislocation.   MRI brain normal    Assessment/Plan:     Cervical myelopathy  Pt is a 58 yo F with findings on history and exam consistent with cervical myelopathy and C3-4 cervical stenosis seen on MRI and CT. She would likely benefit from surgical decompression. She has had relatively stable symptoms for over a month now. Imaging obtained in ER. Will have patient f/u in clinic to  schedule surgery likely early next week. Discharged with medrol dose pack and instructions to return to ER if she has rapid worsening of symptoms or new concerning symptoms. Will call to schedule f/u in spine clinic.          Donis Caro MD  Orthopedics  [facility]

## (undated) DEVICE — BUR BONE CUT MICRO TPS 3X3.8MM

## (undated) DEVICE — DRESSING MEPILEX BORDER 4 X 4

## (undated) DEVICE — SYR 30CC LUER LOCK

## (undated) DEVICE — DIFFUSER

## (undated) DEVICE — SEE MEDLINE ITEM 157150

## (undated) DEVICE — DRESSING AQUACEL SACRAL 9 X 9

## (undated) DEVICE — SPONGE GAUZE 16PLY 4X4

## (undated) DEVICE — SEE MEDLINE ITEM 146347

## (undated) DEVICE — SPONGE LAP 4X18 PREWASHED

## (undated) DEVICE — BLADE MILL BONE MEDIUM

## (undated) DEVICE — SUT 2/0 30IN SILK BLK BRAI

## (undated) DEVICE — SEE MEDLINE ITEM 157131

## (undated) DEVICE — TRAY FOLEY 16FR INFECTION CONT

## (undated) DEVICE — DRESSING ABSRBNT ISLAND 3.6X8

## (undated) DEVICE — MARKER SKIN STND TIP BLUE BARR

## (undated) DEVICE — SEE MEDLINE ITEM 146417

## (undated) DEVICE — NDL SPINAL 18GX3.5 SPINOCAN

## (undated) DEVICE — ADHESIVE MASTISOL VIAL 48/BX

## (undated) DEVICE — CARTRIDGE OIL

## (undated) DEVICE — BLADE 4IN EDGE INSULATED

## (undated) DEVICE — ELECTRODE BLD 1 INCH TEFLON

## (undated) DEVICE — CLOSURE SKIN 1X5 STERI-STRIP

## (undated) DEVICE — KIT SURGIFLO HEMOSTATIC MATRIX

## (undated) DEVICE — NDL 18GA X1 1/2 REG BEVEL

## (undated) DEVICE — TUBE FRAZIER 5MM 2FT SOFT TIP

## (undated) DEVICE — SYS CLSR DERMABOND PRINEO 22CM

## (undated) DEVICE — 4.0MM TAP

## (undated) DEVICE — APPLICATOR CHLORAPREP ORN 26ML

## (undated) DEVICE — SUT VICRYL PLUS 2-0 CT1 18

## (undated) DEVICE — DRESSING TRANS 4X4 TEGADERM

## (undated) DEVICE — BURR RND FLUT SFT TOUCH 2.0MM

## (undated) DEVICE — DRAPE STERI-DRAPE 1000 17X11IN

## (undated) DEVICE — ELECTRODE REM PLYHSV RETURN 9

## (undated) DEVICE — SEE MEDLINE ITEM 157117

## (undated) DEVICE — DRESSING AQUACEL FOAM 4 X 12

## (undated) DEVICE — DRAPE ABDOMINAL TIBURON 14X11

## (undated) DEVICE — DRESSING AQUACEL FOAM 5 X 5

## (undated) DEVICE — SEE MEDLINE ITEM 156905

## (undated) DEVICE — NDL 22GA X1 1/2 REG BEVEL

## (undated) DEVICE — SUT ETHIBOND 0 CR/CT-1 8-18

## (undated) DEVICE — BLADE ELECTRO EDGE INSULATED

## (undated) DEVICE — DRAPE C-ARMOR EQUIPMENT COVER